# Patient Record
Sex: MALE | Race: WHITE | NOT HISPANIC OR LATINO | Employment: OTHER | URBAN - METROPOLITAN AREA
[De-identification: names, ages, dates, MRNs, and addresses within clinical notes are randomized per-mention and may not be internally consistent; named-entity substitution may affect disease eponyms.]

---

## 2017-01-10 ENCOUNTER — GENERIC CONVERSION - ENCOUNTER (OUTPATIENT)
Dept: OTHER | Facility: OTHER | Age: 77
End: 2017-01-10

## 2017-01-18 ENCOUNTER — GENERIC CONVERSION - ENCOUNTER (OUTPATIENT)
Dept: OTHER | Facility: OTHER | Age: 77
End: 2017-01-18

## 2017-01-19 LAB
INR PPP: 2.9 (ref 0.8–1.2)
PROTHROMBIN TIME: 30.1 SEC (ref 9.1–12)

## 2017-01-30 ENCOUNTER — GENERIC CONVERSION - ENCOUNTER (OUTPATIENT)
Dept: OTHER | Facility: OTHER | Age: 77
End: 2017-01-30

## 2017-01-31 LAB
INR PPP: 3.3 (ref 0.8–1.2)
PROTHROMBIN TIME: 33.9 SEC (ref 9.1–12)

## 2017-02-15 ENCOUNTER — GENERIC CONVERSION - ENCOUNTER (OUTPATIENT)
Dept: OTHER | Facility: OTHER | Age: 77
End: 2017-02-15

## 2017-02-16 LAB
INR PPP: 3.5 (ref 0.8–1.2)
PROTHROMBIN TIME: 36.1 SEC (ref 9.1–12)

## 2017-03-17 ENCOUNTER — HOSPITAL ENCOUNTER (OUTPATIENT)
Facility: HOSPITAL | Age: 77
Discharge: HOME HEALTH | End: 2017-03-31
Attending: PHYSICAL MEDICINE & REHABILITATION | Admitting: PHYSICAL MEDICINE & REHABILITATION

## 2017-03-18 LAB
ALBUMIN SERPL BCP-MCNC: 2.6 G/DL (ref 3.5–5)
ALP SERPL-CCNC: 79 U/L (ref 46–116)
ALT SERPL W P-5'-P-CCNC: 49 U/L (ref 12–78)
ANION GAP SERPL CALCULATED.3IONS-SCNC: 7 MMOL/L (ref 4–13)
AST SERPL W P-5'-P-CCNC: 38 U/L (ref 5–45)
BASOPHILS # BLD AUTO: 0.02 THOUSANDS/ΜL (ref 0–0.1)
BASOPHILS NFR BLD AUTO: 0 % (ref 0–1)
BILIRUB DIRECT SERPL-MCNC: 0.36 MG/DL (ref 0–0.2)
BILIRUB SERPL-MCNC: 1.1 MG/DL (ref 0.2–1)
BUN SERPL-MCNC: 20 MG/DL (ref 5–25)
CALCIUM SERPL-MCNC: 9 MG/DL (ref 8.3–10.1)
CHLORIDE SERPL-SCNC: 107 MMOL/L (ref 100–108)
CO2 SERPL-SCNC: 30 MMOL/L (ref 21–32)
CREAT SERPL-MCNC: 0.81 MG/DL (ref 0.6–1.3)
EOSINOPHIL # BLD AUTO: 0.05 THOUSAND/ΜL (ref 0–0.61)
EOSINOPHIL NFR BLD AUTO: 1 % (ref 0–6)
ERYTHROCYTE [DISTWIDTH] IN BLOOD BY AUTOMATED COUNT: 15.4 % (ref 11.6–15.1)
GFR SERPL CREATININE-BSD FRML MDRD: >60 ML/MIN/1.73SQ M
GLUCOSE SERPL-MCNC: 117 MG/DL (ref 65–140)
HCT VFR BLD AUTO: 41.9 % (ref 36.5–49.3)
HGB BLD-MCNC: 13.4 G/DL (ref 12–17)
INR PPP: 3.06 (ref 0.86–1.16)
LYMPHOCYTES # BLD AUTO: 1.48 THOUSANDS/ΜL (ref 0.6–4.47)
LYMPHOCYTES NFR BLD AUTO: 25 % (ref 14–44)
MCH RBC QN AUTO: 30.6 PG (ref 26.8–34.3)
MCHC RBC AUTO-ENTMCNC: 32 G/DL (ref 31.4–37.4)
MCV RBC AUTO: 96 FL (ref 82–98)
MONOCYTES # BLD AUTO: 0.78 THOUSAND/ΜL (ref 0.17–1.22)
MONOCYTES NFR BLD AUTO: 13 % (ref 4–12)
NEUTROPHILS # BLD AUTO: 3.61 THOUSANDS/ΜL (ref 1.85–7.62)
NEUTS SEG NFR BLD AUTO: 61 % (ref 43–75)
NRBC BLD AUTO-RTO: 0 /100 WBCS
PLATELET # BLD AUTO: 201 THOUSANDS/UL (ref 149–390)
PMV BLD AUTO: 9.4 FL (ref 8.9–12.7)
POTASSIUM SERPL-SCNC: 4 MMOL/L (ref 3.5–5.3)
PROT SERPL-MCNC: 7 G/DL (ref 6.4–8.2)
PROTHROMBIN TIME: 30.6 SECONDS (ref 12–14.3)
RBC # BLD AUTO: 4.38 MILLION/UL (ref 3.88–5.62)
SODIUM SERPL-SCNC: 144 MMOL/L (ref 136–145)
WBC # BLD AUTO: 5.96 THOUSAND/UL (ref 4.31–10.16)

## 2017-03-18 PROCEDURE — 80048 BASIC METABOLIC PNL TOTAL CA: CPT | Performed by: INTERNAL MEDICINE

## 2017-03-18 PROCEDURE — 85610 PROTHROMBIN TIME: CPT | Performed by: INTERNAL MEDICINE

## 2017-03-18 PROCEDURE — 85025 COMPLETE CBC W/AUTO DIFF WBC: CPT | Performed by: INTERNAL MEDICINE

## 2017-03-18 PROCEDURE — 80076 HEPATIC FUNCTION PANEL: CPT | Performed by: PHYSICAL MEDICINE & REHABILITATION

## 2017-03-20 LAB
ALBUMIN SERPL BCP-MCNC: 2.6 G/DL (ref 3.5–5)
ALP SERPL-CCNC: 81 U/L (ref 46–116)
ALT SERPL W P-5'-P-CCNC: 52 U/L (ref 12–78)
ANION GAP SERPL CALCULATED.3IONS-SCNC: 8 MMOL/L (ref 4–13)
AST SERPL W P-5'-P-CCNC: 38 U/L (ref 5–45)
BASOPHILS # BLD AUTO: 0.02 THOUSANDS/ΜL (ref 0–0.1)
BASOPHILS NFR BLD AUTO: 0 % (ref 0–1)
BILIRUB SERPL-MCNC: 1 MG/DL (ref 0.2–1)
BUN SERPL-MCNC: 19 MG/DL (ref 5–25)
CALCIUM SERPL-MCNC: 9 MG/DL (ref 8.3–10.1)
CHLORIDE SERPL-SCNC: 103 MMOL/L (ref 100–108)
CO2 SERPL-SCNC: 28 MMOL/L (ref 21–32)
CREAT SERPL-MCNC: 0.85 MG/DL (ref 0.6–1.3)
EOSINOPHIL # BLD AUTO: 0.06 THOUSAND/ΜL (ref 0–0.61)
EOSINOPHIL NFR BLD AUTO: 1 % (ref 0–6)
ERYTHROCYTE [DISTWIDTH] IN BLOOD BY AUTOMATED COUNT: 15.2 % (ref 11.6–15.1)
GFR SERPL CREATININE-BSD FRML MDRD: >60 ML/MIN/1.73SQ M
GLUCOSE SERPL-MCNC: 114 MG/DL (ref 65–140)
HCT VFR BLD AUTO: 39.1 % (ref 36.5–49.3)
HGB BLD-MCNC: 12.7 G/DL (ref 12–17)
INR PPP: 2.87 (ref 0.86–1.16)
LYMPHOCYTES # BLD AUTO: 1.61 THOUSANDS/ΜL (ref 0.6–4.47)
LYMPHOCYTES NFR BLD AUTO: 25 % (ref 14–44)
MCH RBC QN AUTO: 30.8 PG (ref 26.8–34.3)
MCHC RBC AUTO-ENTMCNC: 32.5 G/DL (ref 31.4–37.4)
MCV RBC AUTO: 95 FL (ref 82–98)
MONOCYTES # BLD AUTO: 0.92 THOUSAND/ΜL (ref 0.17–1.22)
MONOCYTES NFR BLD AUTO: 14 % (ref 4–12)
NEUTROPHILS # BLD AUTO: 3.77 THOUSANDS/ΜL (ref 1.85–7.62)
NEUTS SEG NFR BLD AUTO: 59 % (ref 43–75)
NRBC BLD AUTO-RTO: 0 /100 WBCS
PLATELET # BLD AUTO: 256 THOUSANDS/UL (ref 149–390)
PMV BLD AUTO: 9.5 FL (ref 8.9–12.7)
POTASSIUM SERPL-SCNC: 3.5 MMOL/L (ref 3.5–5.3)
PROT SERPL-MCNC: 6.9 G/DL (ref 6.4–8.2)
PROTHROMBIN TIME: 29.1 SECONDS (ref 12–14.3)
RBC # BLD AUTO: 4.13 MILLION/UL (ref 3.88–5.62)
SODIUM SERPL-SCNC: 139 MMOL/L (ref 136–145)
WBC # BLD AUTO: 6.41 THOUSAND/UL (ref 4.31–10.16)

## 2017-03-20 PROCEDURE — 85610 PROTHROMBIN TIME: CPT | Performed by: PHYSICAL MEDICINE & REHABILITATION

## 2017-03-20 PROCEDURE — 85025 COMPLETE CBC W/AUTO DIFF WBC: CPT | Performed by: PHYSICAL MEDICINE & REHABILITATION

## 2017-03-20 PROCEDURE — 80053 COMPREHEN METABOLIC PANEL: CPT | Performed by: PHYSICAL MEDICINE & REHABILITATION

## 2017-03-23 LAB
ANION GAP SERPL CALCULATED.3IONS-SCNC: 7 MMOL/L (ref 4–13)
BASOPHILS # BLD AUTO: 0.04 THOUSANDS/ΜL (ref 0–0.1)
BASOPHILS NFR BLD AUTO: 1 % (ref 0–1)
BUN SERPL-MCNC: 19 MG/DL (ref 5–25)
CALCIUM SERPL-MCNC: 9.6 MG/DL (ref 8.3–10.1)
CHLORIDE SERPL-SCNC: 103 MMOL/L (ref 100–108)
CO2 SERPL-SCNC: 28 MMOL/L (ref 21–32)
CREAT SERPL-MCNC: 0.91 MG/DL (ref 0.6–1.3)
EOSINOPHIL # BLD AUTO: 0.03 THOUSAND/ΜL (ref 0–0.61)
EOSINOPHIL NFR BLD AUTO: 1 % (ref 0–6)
ERYTHROCYTE [DISTWIDTH] IN BLOOD BY AUTOMATED COUNT: 14.9 % (ref 11.6–15.1)
GFR SERPL CREATININE-BSD FRML MDRD: >60 ML/MIN/1.73SQ M
GLUCOSE SERPL-MCNC: 109 MG/DL (ref 65–140)
HCT VFR BLD AUTO: 41.1 % (ref 36.5–49.3)
HGB BLD-MCNC: 13.2 G/DL (ref 12–17)
INR PPP: 2.31 (ref 0.86–1.16)
LYMPHOCYTES # BLD AUTO: 1.57 THOUSANDS/ΜL (ref 0.6–4.47)
LYMPHOCYTES NFR BLD AUTO: 32 % (ref 14–44)
MCH RBC QN AUTO: 30.6 PG (ref 26.8–34.3)
MCHC RBC AUTO-ENTMCNC: 32.1 G/DL (ref 31.4–37.4)
MCV RBC AUTO: 95 FL (ref 82–98)
MONOCYTES # BLD AUTO: 0.57 THOUSAND/ΜL (ref 0.17–1.22)
MONOCYTES NFR BLD AUTO: 12 % (ref 4–12)
NEUTROPHILS # BLD AUTO: 2.64 THOUSANDS/ΜL (ref 1.85–7.62)
NEUTS SEG NFR BLD AUTO: 54 % (ref 43–75)
NRBC BLD AUTO-RTO: 0 /100 WBCS
PLATELET # BLD AUTO: 316 THOUSANDS/UL (ref 149–390)
PMV BLD AUTO: 9.2 FL (ref 8.9–12.7)
POTASSIUM SERPL-SCNC: 3.9 MMOL/L (ref 3.5–5.3)
PROTHROMBIN TIME: 24.7 SECONDS (ref 12–14.3)
RBC # BLD AUTO: 4.32 MILLION/UL (ref 3.88–5.62)
SODIUM SERPL-SCNC: 138 MMOL/L (ref 136–145)
WBC # BLD AUTO: 4.88 THOUSAND/UL (ref 4.31–10.16)

## 2017-03-23 PROCEDURE — 80048 BASIC METABOLIC PNL TOTAL CA: CPT | Performed by: PHYSICAL MEDICINE & REHABILITATION

## 2017-03-23 PROCEDURE — 85025 COMPLETE CBC W/AUTO DIFF WBC: CPT | Performed by: PHYSICAL MEDICINE & REHABILITATION

## 2017-03-23 PROCEDURE — 85610 PROTHROMBIN TIME: CPT | Performed by: PHYSICAL MEDICINE & REHABILITATION

## 2017-03-27 LAB
ANION GAP SERPL CALCULATED.3IONS-SCNC: 6 MMOL/L (ref 4–13)
BASOPHILS # BLD AUTO: 0.04 THOUSANDS/ΜL (ref 0–0.1)
BASOPHILS NFR BLD AUTO: 1 % (ref 0–1)
BUN SERPL-MCNC: 17 MG/DL (ref 5–25)
CALCIUM SERPL-MCNC: 9.7 MG/DL (ref 8.3–10.1)
CHLORIDE SERPL-SCNC: 104 MMOL/L (ref 100–108)
CO2 SERPL-SCNC: 30 MMOL/L (ref 21–32)
CREAT SERPL-MCNC: 0.86 MG/DL (ref 0.6–1.3)
EOSINOPHIL # BLD AUTO: 0.06 THOUSAND/ΜL (ref 0–0.61)
EOSINOPHIL NFR BLD AUTO: 1 % (ref 0–6)
ERYTHROCYTE [DISTWIDTH] IN BLOOD BY AUTOMATED COUNT: 14.7 % (ref 11.6–15.1)
GFR SERPL CREATININE-BSD FRML MDRD: >60 ML/MIN/1.73SQ M
GLUCOSE SERPL-MCNC: 95 MG/DL (ref 65–140)
HCT VFR BLD AUTO: 43.7 % (ref 36.5–49.3)
HGB BLD-MCNC: 13.9 G/DL (ref 12–17)
INR PPP: 1.67 (ref 0.86–1.16)
LYMPHOCYTES # BLD AUTO: 1.48 THOUSANDS/ΜL (ref 0.6–4.47)
LYMPHOCYTES NFR BLD AUTO: 33 % (ref 14–44)
MCH RBC QN AUTO: 30.4 PG (ref 26.8–34.3)
MCHC RBC AUTO-ENTMCNC: 31.8 G/DL (ref 31.4–37.4)
MCV RBC AUTO: 96 FL (ref 82–98)
MONOCYTES # BLD AUTO: 0.46 THOUSAND/ΜL (ref 0.17–1.22)
MONOCYTES NFR BLD AUTO: 10 % (ref 4–12)
NEUTROPHILS # BLD AUTO: 2.46 THOUSANDS/ΜL (ref 1.85–7.62)
NEUTS SEG NFR BLD AUTO: 54 % (ref 43–75)
NRBC BLD AUTO-RTO: 0 /100 WBCS
PLATELET # BLD AUTO: 362 THOUSANDS/UL (ref 149–390)
PMV BLD AUTO: 9.1 FL (ref 8.9–12.7)
POTASSIUM SERPL-SCNC: 4.1 MMOL/L (ref 3.5–5.3)
PROTHROMBIN TIME: 19.3 SECONDS (ref 12–14.3)
RBC # BLD AUTO: 4.57 MILLION/UL (ref 3.88–5.62)
SODIUM SERPL-SCNC: 140 MMOL/L (ref 136–145)
WBC # BLD AUTO: 4.54 THOUSAND/UL (ref 4.31–10.16)

## 2017-03-27 PROCEDURE — 85025 COMPLETE CBC W/AUTO DIFF WBC: CPT | Performed by: PHYSICAL MEDICINE & REHABILITATION

## 2017-03-27 PROCEDURE — 80048 BASIC METABOLIC PNL TOTAL CA: CPT | Performed by: PHYSICAL MEDICINE & REHABILITATION

## 2017-03-27 PROCEDURE — 85610 PROTHROMBIN TIME: CPT | Performed by: PHYSICAL MEDICINE & REHABILITATION

## 2017-03-28 LAB
INR PPP: 1.78 (ref 0.86–1.16)
PROTHROMBIN TIME: 20.3 SECONDS (ref 12–14.3)

## 2017-03-28 PROCEDURE — 85610 PROTHROMBIN TIME: CPT | Performed by: INTERNAL MEDICINE

## 2017-03-29 LAB
INR PPP: 1.85 (ref 0.86–1.16)
PROTHROMBIN TIME: 20.9 SECONDS (ref 12–14.3)

## 2017-03-29 PROCEDURE — 85610 PROTHROMBIN TIME: CPT | Performed by: NURSE PRACTITIONER

## 2017-03-30 LAB
ALBUMIN SERPL BCP-MCNC: 3 G/DL (ref 3.5–5)
ALP SERPL-CCNC: 93 U/L (ref 46–116)
ALT SERPL W P-5'-P-CCNC: 47 U/L (ref 12–78)
ANION GAP SERPL CALCULATED.3IONS-SCNC: 8 MMOL/L (ref 4–13)
AST SERPL W P-5'-P-CCNC: 27 U/L (ref 5–45)
BASOPHILS # BLD AUTO: 0.06 THOUSANDS/ΜL (ref 0–0.1)
BASOPHILS NFR BLD AUTO: 1 % (ref 0–1)
BILIRUB SERPL-MCNC: 0.6 MG/DL (ref 0.2–1)
BUN SERPL-MCNC: 14 MG/DL (ref 5–25)
CALCIUM SERPL-MCNC: 9.6 MG/DL (ref 8.3–10.1)
CHLORIDE SERPL-SCNC: 104 MMOL/L (ref 100–108)
CO2 SERPL-SCNC: 29 MMOL/L (ref 21–32)
CREAT SERPL-MCNC: 0.85 MG/DL (ref 0.6–1.3)
EOSINOPHIL # BLD AUTO: 0.08 THOUSAND/ΜL (ref 0–0.61)
EOSINOPHIL NFR BLD AUTO: 2 % (ref 0–6)
ERYTHROCYTE [DISTWIDTH] IN BLOOD BY AUTOMATED COUNT: 15 % (ref 11.6–15.1)
GFR SERPL CREATININE-BSD FRML MDRD: >60 ML/MIN/1.73SQ M
GLUCOSE SERPL-MCNC: 93 MG/DL (ref 65–140)
HCT VFR BLD AUTO: 43.8 % (ref 36.5–49.3)
HGB BLD-MCNC: 14 G/DL (ref 12–17)
LYMPHOCYTES # BLD AUTO: 2 THOUSANDS/ΜL (ref 0.6–4.47)
LYMPHOCYTES NFR BLD AUTO: 38 % (ref 14–44)
MCH RBC QN AUTO: 30.3 PG (ref 26.8–34.3)
MCHC RBC AUTO-ENTMCNC: 32 G/DL (ref 31.4–37.4)
MCV RBC AUTO: 95 FL (ref 82–98)
MONOCYTES # BLD AUTO: 0.55 THOUSAND/ΜL (ref 0.17–1.22)
MONOCYTES NFR BLD AUTO: 11 % (ref 4–12)
NEUTROPHILS # BLD AUTO: 2.47 THOUSANDS/ΜL (ref 1.85–7.62)
NEUTS SEG NFR BLD AUTO: 47 % (ref 43–75)
NRBC BLD AUTO-RTO: 0 /100 WBCS
PLATELET # BLD AUTO: 321 THOUSANDS/UL (ref 149–390)
PMV BLD AUTO: 9.2 FL (ref 8.9–12.7)
POTASSIUM SERPL-SCNC: 3.8 MMOL/L (ref 3.5–5.3)
PROT SERPL-MCNC: 7.2 G/DL (ref 6.4–8.2)
RBC # BLD AUTO: 4.62 MILLION/UL (ref 3.88–5.62)
SODIUM SERPL-SCNC: 141 MMOL/L (ref 136–145)
WBC # BLD AUTO: 5.21 THOUSAND/UL (ref 4.31–10.16)

## 2017-03-30 PROCEDURE — 85025 COMPLETE CBC W/AUTO DIFF WBC: CPT | Performed by: INTERNAL MEDICINE

## 2017-03-30 PROCEDURE — 80053 COMPREHEN METABOLIC PANEL: CPT | Performed by: INTERNAL MEDICINE

## 2017-03-31 LAB
INR PPP: 1.72 (ref 0.86–1.16)
PROTHROMBIN TIME: 19.7 SECONDS (ref 12–14.3)

## 2017-03-31 PROCEDURE — 85610 PROTHROMBIN TIME: CPT | Performed by: PHYSICAL MEDICINE & REHABILITATION

## 2017-04-17 ENCOUNTER — GENERIC CONVERSION - ENCOUNTER (OUTPATIENT)
Dept: OTHER | Facility: OTHER | Age: 77
End: 2017-04-17

## 2017-04-17 DIAGNOSIS — I48.91 ATRIAL FIBRILLATION (HCC): ICD-10-CM

## 2017-04-21 ENCOUNTER — ALLSCRIPTS OFFICE VISIT (OUTPATIENT)
Dept: OTHER | Facility: OTHER | Age: 77
End: 2017-04-21

## 2017-06-29 ENCOUNTER — GENERIC CONVERSION - ENCOUNTER (OUTPATIENT)
Dept: OTHER | Facility: OTHER | Age: 77
End: 2017-06-29

## 2017-06-30 DIAGNOSIS — I48.91 ATRIAL FIBRILLATION (HCC): ICD-10-CM

## 2017-08-16 ENCOUNTER — GENERIC CONVERSION - ENCOUNTER (OUTPATIENT)
Dept: OTHER | Facility: OTHER | Age: 77
End: 2017-08-16

## 2017-08-21 ENCOUNTER — ALLSCRIPTS OFFICE VISIT (OUTPATIENT)
Dept: OTHER | Facility: OTHER | Age: 77
End: 2017-08-21

## 2017-09-13 DIAGNOSIS — I48.91 ATRIAL FIBRILLATION (HCC): ICD-10-CM

## 2018-01-09 NOTE — PROGRESS NOTES
REPORT NAME: Patient Visit Summary Report   VISIT DATE: 5/31/2016  VISIT TIME: 2:17 PM EDT  PATIENT NAME: Douglas Vega RECORD NUMBER: [id number]  SOCIAL SECURITY NUMBER:   YOB: 1940  AGE: 68  REFERRING PHYSICIAN: Dorota Laguna  SUPERVISING CLINICIAN: Dorota Laguna  HEALTH CARE PROFESSIONAL: Estefania Crum   PATIENT HOME ADDRESS: [address] [place] [address]  PATIENT HOME PHONE: [phone]  DIAGNOSIS 1: Atrial Fibrillation / 427 31  DIAGNOSIS 2:   DIAGNOSIS 3:   DIAGNOSIS 4:   INR RANGE: 2 - 3  INR GOAL: 2 5  TREATMENT START DATE:   TREATMENT END DATE:   NEXT VISIT: 6/21/2016  [facility]    VISIT RESULTS   ENCOUNTER NUMBER:   TEST LOCATION: [facility]  TEST TYPE: Outside Lab (Venipuncture)  VISIT TYPE:   CURRENT INR: 2 66 PROTIME:   SPECIMEN COL AND RPT DATE: 5/31/2016 2:17 PM  EDT    VITAL SIGNS  PULSE:  B/P:  WEIGHT:  HEIGHT:  TEMP:     CURRENT ANTICOAGULANT DOSING SCHEDULE  DOSE SIZE: 5mg    ANTICOAGULANT TYPE: COUMADIN  DOSING REGIMEN  Sun       Mon Tues Wed Thurs Fri       Sat  Total/Wk  5         5         5         2 5       5         5         5         32 5    PATIENT MEDICATION INSTRUCTION: Yes  PATIENT NUTRITIONAL COUNSELING: No  PATIENT BRUISING INSTRUCTION: No      LAST EDUCATION DATE:       PREVIOUS VISIT INFORMATION  VISITDATE   INR Goal  INR   Sun     Mon Tues Wed Thurs Fri  Sat     Total/wk  5/31/2016   2 5       2 66  5       5       5       2 5     5       5  5       32 5  5/27/2016   2 5       2 66  5       5       5       2 5     5       5  5       32 5  5/20/2016   2 5       1 63  5       5       5       2 5     5       5  5       32 5  5/6/2016    2 5       1 7   5       5       2 5     5       5       2 5  5       30    ADDITIONAL PREVIOUS VISIT INFORMATION  VISITDATE   PRIMARY RX               DOSE      CrCl  5/31/2016   COUMADIN                 5mg                 5/27/2016   COUMADIN                 5mg 5/20/2016   COUMADIN                 5mg                 5/6/2016    COUMADIN                 5mg                     OTHER CURRENT MEDICATIONS: COUMADIN      PROGRESS NOTES: same dose   recheck 2-3 weeks - per AS    PATIENT INSTRUCTIONS: spoke with patients wife    TEST LOCATION: [facility]    Electronically signed by: Yasmeen Hood  on 5/31/2016 at 2:17 PM EDT

## 2018-01-11 NOTE — PROGRESS NOTES
REPORT NAME: Progress Notes Report  VISIT DATE: 4/17/2017  VISIT TIME: 8:51 AM EDT  PATIENT NAME: Conor Keith RECORD NUMBER: [id number]  YOB: 1940  AGE: 68  REFERRING PHYSICIAN: Yoselin Negro  SUPERVISING CLINICIAN: Noemi Rosas CARE PROVIDER: Wilton Mcconnell   PATIENT HOME ADDRESS: [address] [place] [address] Cesario Bellavard PHONE: [phone]  SOCIAL SECURITY NUMBER:   DIAGNOSIS 1: Atrial Fibrillation / 427 31  DIAGNOSIS 2:   INR RANGE: 2 - 3  INR GOAL: 2 5  TREATMENT START DATE:   TREATMENT END DATE:   NEXT VISIT:     VISIT RESULTS  ENCOUNTER NUMBER:   TEST LOCATION: [facility]  TEST TYPE: Outside Lab (Venipuncture)  VISIT TYPE:   CURRENT INR: 2 31 PROTIME:   SPECIMEN COL AND RPT DATE: 4/17/2017 8:51 AM  EDT  VITAL SIGNS  PULSE:  BP: / WEIGHT:  HEIGHT:  TEMP:   CURRENT ANTICOAGULANT DOSING SCHEDULE  DOSE SIZE: 5mg    ANTICOAGULANT TYPE: COUMADIN  DOSING REGIMEN  Sun       Mon Tues Wed Thurs Fri       Sat  Total/Wk  5         5         5         2 5       5         5         5         32 5  PATIENT MEDICATION INSTRUCTION: Yes  PATIENT NUTRITIONAL COUNSELING: No  PATIENT BRUISING INSTRUCTION: No  LAST EDUCATION DATE:   PREVIOUS VISIT INFORMATION  VISITDATE  INRGoal INR   Sun    Mon    Tues   Wed    Thurs  Fri    Sat  Total/wk  4/17/2017   2 5     2 31  5      5      5      2 5    5      5      5  32 5  4/6/2017    2 5     2 2   5      5      5      2 5    5      5      5  32 5  4/3/2017    2 5     2 1   5      5      5      2 5    5      5      5  32 5  2/15/2017   2 5     3 5   5      5      5      2 5    5      5      5  32 5  ADDITIONAL PREVIOUS VISIT INFORMATION  VISITDATE   PRIMARY RX               DOSE      CrCl  4/17/2017   COUMADIN                 5mg  4/6/2017    COUMADIN                 5mg  4/3/2017    COUMADIN                 5mg  2/15/2017   COUMADIN                 5mg  OTHER CURRENT MEDICATIONS:  COUMADIN  PROGRESS NOTES: PER  PAT SAME DOSE RECHECK 4 WEEKS, SPOKE WITH PT  PATIENT INSTRUCTIONS: SEE PROGRESS NOTE  TEST LOCATION: [facility], , ,   INBOUND LAB DATA:  Lab       Lab Value Col Date                 Rpt Date                 Lab  Reference Range  Electronically signed by: Giles Person  on 4/24/2017 8:51 AM EDT

## 2018-01-12 NOTE — PROGRESS NOTES
REPORT NAME: Patient Visit Summary Report   VISIT DATE: 6/8/2016  VISIT TIME: 11:31 AM EDT  PATIENT NAME: Ever Steward   MEDICAL RECORD NUMBER: [id number]  SOCIAL SECURITY NUMBER:   YOB: 1940  AGE: 68  REFERRING PHYSICIAN: Jenifer العلي  SUPERVISING CLINICIAN: Jenifer العلي  HEALTH CARE PROFESSIONAL: Kamala Florian   PATIENT HOME ADDRESS: [address] [place] [address] PHONE: [phone]  DIAGNOSIS 1: Atrial Fibrillation / 427 31  DIAGNOSIS 2:   DIAGNOSIS 3:   DIAGNOSIS 4:   INR RANGE: 2 - 3  INR GOAL: 2 5  TREATMENT START DATE:   TREATMENT END DATE:   NEXT VISIT:       VISIT RESULTS   ENCOUNTER NUMBER:   TEST LOCATION: [facility]  TEST TYPE: Outside Lab (Venipuncture)  VISIT TYPE:   CURRENT INR: 2 57 PROTIME:   SPECIMEN COL AND RPT DATE: 6/8/2016 11:31 AM  EDT    VITAL SIGNS  PULSE:  B/P:  WEIGHT:  HEIGHT:  TEMP:     CURRENT ANTICOAGULANT DOSING SCHEDULE  DOSE SIZE: 5mg    ANTICOAGULANT TYPE: COUMADIN  DOSING REGIMEN  Sun       Mon Tues Wed Thurs Fri       Sat  Total/Wk  5         5         5         2 5       5         5         5         32 5    PATIENT MEDICATION INSTRUCTION: Yes  PATIENT NUTRITIONAL COUNSELING: No  PATIENT BRUISING INSTRUCTION: No      LAST EDUCATION DATE:       PREVIOUS VISIT INFORMATION  VISITDATE   INR Goal  INR   Sun     Mon     Tues    Wed     Thurs   Fri  Sat     Total/wk  6/8/2016    2 5       2 57  5       5       5       2 5     5       5  5       32 5  5/31/2016   2 5       2 66  5       5       5       2 5     5       5  5       32 5  5/27/2016   2 5       2 66  5       5       5       2 5     5       5  5       32 5  5/20/2016   2 5       1 63  5       5       5       2 5     5       5  5       32 5    ADDITIONAL PREVIOUS VISIT INFORMATION  VISITDATE   PRIMARY RX               DOSE      CrCl  6/8/2016    COUMADIN                 5mg                 5/31/2016   COUMADIN                 5mg                 5/27/2016 COUMADIN                 5mg                 5/20/2016   COUMADIN                 5mg                     OTHER CURRENT MEDICATIONS: COUMADIN      PROGRESS NOTES: PER SS/NP SAME DOSE RECHECK 3-4 WEEKS, SPOKE WITH PT    PATIENT INSTRUCTIONS: SEE PROGRESS NOTE    TEST LOCATION: [facility]    Electronically signed by: Dorina Blakely  on 6/9/2016 at 11:31 AM EDT

## 2018-01-12 NOTE — PROGRESS NOTES
REPORT NAME: Patient Visit Summary Report   VISIT DATE: 5/20/2016  VISIT TIME: 2:37 PM EDT  PATIENT NAME: Tamara Ulloa RECORD NUMBER: [id number]  SOCIAL SECURITY NUMBER:   YOB: 1940  AGE: 68  REFERRING PHYSICIAN: Feroz Marino  SUPERVISING CLINICIAN: Feroz Marino  HEALTH CARE PROFESSIONAL: Anthony Blake   PATIENT HOME ADDRESS: [address] [place] [address]  PATIENT HOME PHONE: [phone]  DIAGNOSIS 1: Atrial Fibrillation / 427 31  DIAGNOSIS 2:   DIAGNOSIS 3:   DIAGNOSIS 4:   INR RANGE: 2 - 3  INR GOAL: 2 5  TREATMENT START DATE:   TREATMENT END DATE:   NEXT VISIT:       VISIT RESULTS   ENCOUNTER  NUMBER:   TEST LOCATION: [facility]  TEST TYPE: Outside Lab (Venipuncture)  VISIT TYPE:   CURRENT INR: 1 63 PROTIME:   SPECIMEN COL AND RPT DATE: 5/20/2016 2:37 PM  EDT    VITAL SIGNS  PULSE:  B/P:  WEIGHT:  HEIGHT:  TEMP:      CURRENT ANTICOAGULANT DOSING SCHEDULE  DOSE SIZE: 5mg    ANTICOAGULANT TYPE: COUMADIN  DOSING REGIMEN  Sun       Mon Tues Wed Thurs Fri       Sat  Total/Wk  5         5         5         2 5       5         5          5         32 5    PATIENT MEDICATION INSTRUCTION: Yes  PATIENT NUTRITIONAL COUNSELING: No  PATIENT BRUISING INSTRUCTION: No      LAST EDUCATION DATE:       PREVIOUS VISIT INFORMATION  VISITDATE   INR Goal  INR   Sun     Mon      Tues    Wed     Thurs   Fri  Sat     Total/wk  5/20/2016   2 5       1 63  5       5       5       2 5     5       5  5       32 5  5/6/2016    2 5       1 7   5       5       2 5     5       5       2 5  5       30  4/29/2016    2 5       1 51  5       2 5     5       2 5     5       2 5  5       27 5  2/25/2016   2 5       2 07  5       2 5     5       2 5     5       2 5  5       27 5    ADDITIONAL PREVIOUS VISIT INFORMATION  VISITDATE   PRIMARY RX                DOSE      CrCl  5/20/2016   COUMADIN                 5mg                 5/6/2016    COUMADIN                 5mg 4/29/2016   COUMADIN                 5mg                 2/25/2016   COUMADIN                 5mg                      OTHER CURRENT MEDICATIONS: COUMADIN      PROGRESS NOTES: PER  CHANGE TO 5/5/5/2 5MG RECHECK 2 WEEKS, SPOKE WITH  PTS WIFE    PATIENT INSTRUCTIONS: SEE PROGRESS NOTE    TEST LOCATION: [facility]    Electronically  signed by: Odessa Zaldivar  on 5/23/2016 at 2:37 PM EDT              Electronically signed Kareem PEREZ    Terry 15 2017  4:59PM EST

## 2018-01-12 NOTE — RESULT NOTES
Verified Results  (1) PT WITH INR [id number] 09:06AM Santos Reyna     Test Name Result Flag Reference   INR 2 07     The prothrombin time (PT) test is the test of choice for           monitoring anticoagulant treatment with warfarin  An INR           range of 2 0-3 0 is considered therapeutic for most           indications such as DVT/PE, atrial fibrillation and           myocardial infarction while an INR range of 2 5-3 5 is           considered therapeutic for patients with mechanical heart           valves     Prothrombin Time 22 4 sec H 10 1-11 9

## 2018-01-12 NOTE — PROGRESS NOTES
REPORT NAME: Progress Notes Report  VISIT DATE: 8/16/2017  VISIT TIME: 10:45 AM EDT  PATIENT NAME: Nidia Paulino RECORD NUMBER: [id number]  YOB: 1940  AGE: 68  REFERRING PHYSICIAN: Giuseppe Pineda  SUPERVISING CLINICIAN: Noemi Rosas CARE PROVIDER: Glenda Tyson  PATIENT HOME ADDRESS: [address] [place] [address] PHONE: [phone]  SOCIAL SECURITY NUMBER:   DIAGNOSIS 1: Atrial Fibrillation / 427 31  DIAGNOSIS 2:   INR RANGE: 2 - 3  INR GOAL: 2 5  TREATMENT START DATE:   TREATMENT END DATE:   NEXT VISIT:     VISIT RESULTS  ENCOUNTER NUMBER:   TEST LOCATION: [facility]  TEST TYPE: Outside Lab (Venipuncture)  VISIT TYPE:   CURRENT INR: 2 3 PROTIME:   SPECIMEN COL AND RPT DATE: 8/16/2017 10:45 AM  EDT  VITAL SIGNS  PULSE:  BP: / WEIGHT:  HEIGHT:  TEMP:   CURRENT ANTICOAGULANT DOSING SCHEDULE  DOSE SIZE: 5mg    ANTICOAGULANT TYPE: COUMADIN  DOSING REGIMEN  Sun       Mon       Tues      Wed       Thurs     Fri       Sat  Total/Wk  5         5         5         2 5       5         5         5         32 5  PATIENT MEDICATION INSTRUCTION: Yes  PATIENT NUTRITIONAL COUNSELING: No  PATIENT BRUISING INSTRUCTION: No  LAST EDUCATION DATE:   PREVIOUS VISIT INFORMATION  VISITDATE  INRGoal INR   Sun    Mon Tues Wed Thurs Fri    Sat  Total/wk  8/16/2017   2 5     2 3   5      5      5      2 5    5      5      5  32 5  6/29/2017   2 5     2 14  5      5      5      2 5    5      5      5  32 5  4/17/2017   2 5     2 31  5      5      5      2 5    5      5      5  32 5  4/6/2017    2 5     2 2   5      5      5      2 5    5      5      5  32 5  ADDITIONAL PREVIOUS VISIT INFORMATION  VISITDATE   PRIMARY RX               DOSE      CrCl  8/16/2017   COUMADIN                 5mg  6/29/2017   COUMADIN                 5mg  4/17/2017   COUMADIN                 5mg  4/6/2017    COUMADIN                 5mg  OTHER CURRENT MEDICATIONS:  COUMADIN  PROGRESS NOTES: PER  PAT SAME DOSE RECHECK 4 WEEKS, SPOKE WITH PTS WIFE  PATIENT INSTRUCTIONS: SEE PROGRESS NOTE  TEST LOCATION: [facility], , ,   INBOUND LAB DATA:  Lab       Lab Value Col Date                 Rpt Date                 Lab  Reference Range  Electronically signed by: Criss Youssef on 8/17/2017 10:45 AM EDT

## 2018-01-13 NOTE — PROGRESS NOTES
REPORT NAME: Patient Visit Summary Report   VISIT DATE: 9/12/2016  VISIT TIME: 11:51 AM EDT  PATIENT NAME: Yanick Mcrae   MEDICAL RECORD NUMBER: [id number]  SOCIAL SECURITY NUMBER:   YOB: 1940  AGE: 68  REFERRING PHYSICIAN: Vanna Cueva  SUPERVISING CLINICIAN: Vanna Cueva  HEALTH CARE PROFESSIONAL: Carole Brandon   PATIENT HOME ADDRESS: [address] [place] [address] PHONE: [phone]  DIAGNOSIS 1: Atrial Fibrillation / 427 31  DIAGNOSIS 2:   DIAGNOSIS 3:   DIAGNOSIS 4:   INR RANGE: 2 - 3  INR GOAL: 2 5  TREATMENT START DATE:   TREATMENT END DATE:   NEXT VISIT:       VISIT RESULTS   ENCOUNTER  NUMBER:   TEST LOCATION: [facility]  TEST TYPE: Outside Lab (Venipuncture)  VISIT TYPE:   CURRENT INR: 3 2 PROTIME:   SPECIMEN COL AND RPT DATE: 9/12/2016 11:51 AM  EDT    VITAL SIGNS  PULSE:  B/P:  WEIGHT:  HEIGHT:  TEMP:      CURRENT ANTICOAGULANT DOSING SCHEDULE  DOSE SIZE: 5mg    ANTICOAGULANT TYPE: COUMADIN  DOSING REGIMEN  Sun       Mon Tues Wed Thurs Fri       Sat  Total/Wk  5         5         5         2 5       5         5          5         32 5    PATIENT MEDICATION INSTRUCTION: Yes  PATIENT NUTRITIONAL COUNSELING: No  PATIENT BRUISING INSTRUCTION: No      LAST EDUCATION DATE:       PREVIOUS VISIT INFORMATION  VISITDATE   INR Goal  INR   Sun     Mon      Tues    Wed     Thurs   Fri  Sat     Total/wk  9/12/2016   2 5       3 2   5       5       5       2 5     5       5  5       32 5  8/31/2016   2 5       3 52  5       5       5       2 5     5       5  5       32 5  8/19/2016    2 5       4 07  5       5       5       2 5     5       5  5       32 5  7/22/2016   2 5       3 05  5       5       5       2 5     5       5  5       32 5    ADDITIONAL PREVIOUS VISIT INFORMATION  VISITDATE   PRIMARY RX                DOSE      CrCl  9/12/2016   COUMADIN                 5mg                 8/31/2016   COUMADIN                 5mg 8/19/2016   COUMADIN                 5mg                 7/22/2016   COUMADIN                 5mg                      OTHER CURRENT MEDICATIONS: COUMADIN      PROGRESS NOTES: PER AS/PA SAME DOSE RECHECK 3 WEEKS, SPOKE WITH PTS WIFE    PATIENT INSTRUCTIONS: SEE PROGRESS NOTE    TEST LOCATION: [facility]    Electronically signed by:  Dee Saldana  on 9/13/2016 at 11:51 AM EDT              Electronically signed Hailey PEREZ    Jun 8 2017  5:37PM EST

## 2018-01-13 NOTE — RESULT NOTES
Verified Results  (1) PT WITH INR [id number] 10:01AM Luly Reyna   RESULTS FAXED TO ORDERING PROVIDER BY LAB @16:02  Test Name Result Flag Reference   INR 1 63     The prothrombin time (PT) test is the test of choice for           monitoring anticoagulant treatment with warfarin  An INR           range of 2 0-3 0 is considered therapeutic for most           indications such as DVT/PE, atrial fibrillation and           myocardial infarction while an INR range of 2 5-3 5 is           considered therapeutic for patients with mechanical heart           valves     Prothrombin Time 17 5 sec H 10 1-11 9

## 2018-01-13 NOTE — PROGRESS NOTES
REPORT NAME: Progress Notes Report  VISIT DATE: 6/29/2017  VISIT TIME: 11:09 AM EDT  PATIENT NAME: Mariann Miller   MEDICAL RECORD NUMBER: [id number]  YOB: 1940  AGE: 68  REFERRING PHYSICIAN: Lazara Hagen  SUPERVISING CLINICIAN: Noemi Rosas CARE PROVIDER: Renetta Quiroz  PATIENT HOME ADDRESS: [address] [place] [address] PHONE: [phone]  SOCIAL SECURITY NUMBER:   DIAGNOSIS 1: Atrial Fibrillation / 427 31  DIAGNOSIS 2:   INR RANGE: 2 - 3  INR GOAL: 2 5  TREATMENT START DATE:   TREATMENT END DATE:   NEXT VISIT:     VISIT RESULTS  ENCOUNTER NUMBER:   TEST LOCATION: [facility]  TEST TYPE: Outside Lab (Venipuncture)  VISIT TYPE:   CURRENT INR: 2 14 PROTIME:   SPECIMEN COL AND RPT DATE: 6/29/2017 11:09 AM  EDT  VITAL SIGNS  PULSE:  BP: / WEIGHT:  HEIGHT:  TEMP:   CURRENT ANTICOAGULANT DOSING SCHEDULE  DOSE SIZE: 5mg    ANTICOAGULANT TYPE: COUMADIN  DOSING REGIMEN  Sun       Mon       Tues      Wed       Thurs     Fri       Sat  Total/Wk  5         5         5         2 5       5         5         5         32 5  PATIENT MEDICATION INSTRUCTION: Yes  PATIENT NUTRITIONAL COUNSELING: No  PATIENT BRUISING INSTRUCTION: No  LAST EDUCATION DATE:   PREVIOUS VISIT INFORMATION  VISITDATE  INRGoal INR   Sun    Mon Tues Wed Thurs Fri    Sat  Total/wk  6/29/2017   2 5     2 14  5      5      5      2 5    5      5      5  32 5  4/17/2017   2 5     2 31  5      5      5      2 5    5      5      5  32 5  4/6/2017    2 5     2 2   5      5      5      2 5    5      5      5  32 5  4/3/2017    2 5     2 1   5      5      5      2 5    5      5      5  32 5  ADDITIONAL PREVIOUS VISIT INFORMATION  VISITDATE   PRIMARY RX               DOSE      CrCl  6/29/2017   COUMADIN                 5mg  4/17/2017   COUMADIN                 5mg  4/6/2017    COUMADIN                 5mg  4/3/2017    COUMADIN                 5mg  OTHER CURRENT MEDICATIONS:  COUMADIN  PROGRESS NOTES: PER  PAT SAME DOSE RECHECK 4 WEEKS, SPOKE WITH PTS WIFE  PATIENT INSTRUCTIONS: SEE PROGRESS NOTE  TEST LOCATION: [facility], , ,   INBOUND LAB DATA:  Lab       Lab Value Col Date                 Rpt Date                 Lab  Reference Range  Electronically signed by: Betzaida Caldera on 6/30/2017 11:09 AM EDT

## 2018-01-14 VITALS
HEIGHT: 70 IN | SYSTOLIC BLOOD PRESSURE: 136 MMHG | WEIGHT: 257.25 LBS | BODY MASS INDEX: 36.83 KG/M2 | HEART RATE: 80 BPM | DIASTOLIC BLOOD PRESSURE: 78 MMHG

## 2018-01-14 VITALS
DIASTOLIC BLOOD PRESSURE: 82 MMHG | WEIGHT: 247.13 LBS | SYSTOLIC BLOOD PRESSURE: 130 MMHG | HEIGHT: 70 IN | HEART RATE: 88 BPM | BODY MASS INDEX: 35.38 KG/M2

## 2018-01-14 NOTE — RESULT NOTES
Verified Results  (1) PT WITH INR [id number] 09:20AM Julianna Reyna   RESULTS FAXED TO ORDERING PROVIDER ON 6/8/2016 @ 4228     Test Name Result Flag Reference   INR 2 57     The prothrombin time (PT) test is the test of choice for           monitoring anticoagulant treatment with warfarin  An INR           range of 2 0-3 0 is considered therapeutic for most           indications such as DVT/PE, atrial fibrillation and           myocardial infarction while an INR range of 2 5-3 5 is           considered therapeutic for patients with mechanical heart           valves     Prothrombin Time 28 1 sec H 10 1-11 9

## 2018-01-15 NOTE — PROGRESS NOTES
REPORT NAME: Patient Visit Summary Report   VISIT DATE: 2/25/2016  VISIT TIME: 1:14 PM EST  PATIENT NAME: Kim Dumont RECORD NUMBER: [id number]  SOCIAL SECURITY NUMBER:   YOB: 1940  AGE: 76  REFERRING PHYSICIAN: Taniya Ashraf  SUPERVISING CLINICIAN: Taniya Ashraf  HEALTH CARE PROFESSIONAL: Myrick Kawasaki   PATIENT HOME ADDRESS: [address] [place] [address] PHONE: [phone]  DIAGNOSIS 1: Atrial Fibrillation / 427 31  DIAGNOSIS 2:   DIAGNOSIS 3:   DIAGNOSIS 4:   INR RANGE: 2 - 3  INR GOAL: 2 5  TREATMENT START DATE:   TREATMENT END DATE:   NEXT VISIT:       VISIT RESULTS   ENCOUNTER NUMBER:   TEST LOCATION: [facility]  TEST TYPE: Outside Lab (Venipuncture)  VISIT TYPE:   CURRENT INR: 2 07 PROTIME:   SPECIMEN COL AND RPT DATE: 2/25/2016 1:14 PM  EST    VITAL SIGNS  PULSE:  B/P:  WEIGHT:  HEIGHT:  TEMP:     CURRENT ANTICOAGULANT DOSING SCHEDULE  DOSE SIZE: 5mg    ANTICOAGULANT TYPE: COUMADIN  DOSING REGIMEN  Sun       Mon Tues Wed Thurs Fri       Sat  Total/Wk  5         2 5       5         2 5       5         2 5       5         27 5    PATIENT MEDICATION INSTRUCTION: Yes  PATIENT NUTRITIONAL COUNSELING: No  PATIENT BRUISING INSTRUCTION: No      LAST EDUCATION DATE:       PREVIOUS VISIT INFORMATION  VISITDATE   INR Goal  INR   Sun     Mon Tues Wed Thurs Fri  Sat     Total/wk  2/25/2016   2 5       2 07  5       2 5     5       2 5     5       2 5  5       27 5  12/22/2015  2 5       2 6   5       2 5     5       2 5     5       2 5  5       27 5  10/14/2015  2 5       2 19  5       2 5     5       2 5     5       2 5  5       27 5  7/8/2015    2 5       1 94  5       2 5     5       2 5     5       2 5  5       27 5    ADDITIONAL PREVIOUS VISIT INFORMATION  VISITDATE   PRIMARY RX               DOSE      CrCl  2/25/2016   COUMADIN                 5mg                 12/22/2015  COUMADIN                 5mg 10/14/2015  COUMADIN                 5mg                 7/8/2015    COUMADIN                 5mg                     OTHER CURRENT MEDICATIONS: COUMADIN      PROGRESS NOTES: PER AS/PA SAME DOSE RECHECK 3 WEEKS, LMOM    PATIENT INSTRUCTIONS: SEE PROGRESS NOTE    TEST LOCATION: [facility]    Electronically signed by: Nicko Bell  on 2/26/2016 at 1:14 PM EST

## 2018-01-15 NOTE — RESULT NOTES
Verified Results  (1) PT WITH INR [id number] 08:02AM Jak Reyna   Results faxed to ordering provider by lab @15:57  Test Name Result Flag Reference   INR 3 20     The prothrombin time (PT) test is the test of choice for           monitoring anticoagulant treatment with warfarin  An INR           range of 2 0-3 0 is considered therapeutic for most           indications such as DVT/PE, atrial fibrillation and           myocardial infarction while an INR range of 2 5-3 5 is           considered therapeutic for patients with mechanical heart           valves     Prothrombin Time 33 3 sec H 10 1-11 9

## 2018-01-17 NOTE — RESULT NOTES
Verified Results  (1) PT WITH INR [id number] 10:47AM Katalina Reyna     Test Name Result Flag Reference   INR 2 9 H 0 8-1 2   Reference interval is for non-anticoagulated patients                   Suggested INR therapeutic range for Vitamin K                 antagonist therapy:                    Standard Dose (moderate intensity                                   therapeutic range):       2 0 - 3 0                    Higher intensity therapeutic range       2 5 - 3 5   Prothrombin Time 30 1 sec H 9 1-12 0

## 2018-01-17 NOTE — PROGRESS NOTES
REPORT NAME: Patient Visit Summary Report   VISIT DATE: 1/10/2017  VISIT TIME: 10:39 AM EST  PATIENT NAME: Jeanna Franco RECORD NUMBER: [id number]  SOCIAL SECURITY NUMBER:   YOB: 1940  AGE: 68  REFERRING PHYSICIAN: Sofy Novak  SUPERVISING CLINICIAN: Sofy Novak  HEALTH CARE PROFESSIONAL: Devika Foster   PATIENT HOME ADDRESS: [address] [place] [address] PHONE: [phone]  DIAGNOSIS 1: Atrial Fibrillation / 427 31  DIAGNOSIS 2:   DIAGNOSIS 3:   DIAGNOSIS 4:   INR RANGE: 2 - 3  INR GOAL: 2 5  TREATMENT START DATE:   TREATMENT END DATE:   NEXT VISIT:       VISIT RESULTS   ENCOUNTER  NUMBER:   TEST LOCATION: [facility]  TEST TYPE: Outside Lab (Venipuncture)  VISIT TYPE:   CURRENT INR: 3 52 PROTIME:   SPECIMEN COL AND RPT DATE: 1/10/2017 10:39 AM  EST    VITAL SIGNS  PULSE:  B/P:  WEIGHT:  HEIGHT:  TEMP:      CURRENT ANTICOAGULANT DOSING SCHEDULE  DOSE SIZE: 5mg    ANTICOAGULANT TYPE: COUMADIN  DOSING REGIMEN  Sun       Mon Tues Wed Thurs Fri       Sat  Total/Wk  5         5         5         2 5       5         5          5         32 5    PATIENT MEDICATION INSTRUCTION: Yes  PATIENT NUTRITIONAL COUNSELING: No  PATIENT BRUISING INSTRUCTION: No      LAST EDUCATION DATE:       PREVIOUS VISIT INFORMATION  VISITDATE   INR Goal  INR   Sun     Mon      Tues    Wed     Thurs   Fri  Sat     Total/wk  1/10/2017   2 5       3 52  5       5       5       2 5     5       5  5       32 5  11/25/2016  2 5       3     5       5       5       2 5     5       5  5       32 5  10/14/2016   2 5       4 09  5       5       5       2 5     5       5  5       32 5  10/6/2016   2 5       3 44  5       5       5       2 5     5       5  5       32 5    ADDITIONAL PREVIOUS VISIT INFORMATION  VISITDATE   PRIMARY RX                DOSE      CrCl  1/10/2017   COUMADIN                 5mg                 11/25/2016  COUMADIN                 5mg 10/14/2016  COUMADIN                 5mg                 10/6/2016   COUMADIN                 5mg                        PROGRESS NOTES: PER DR STEWART HOLD 1 DAY RESUME NORMAL DOSE RECHECK 1 WEEK,  SPOKE WITH PTS WIFE    PATIENT INSTRUCTIONS: SEE PROGRESS NOTE    TEST LOCATION: [facility]    Electronically signed by: Praveen Chu   on 1/11/2017 at 10:39 AM EST              Electronically signed Kimberly PEREZ    Feb 20 2017  5:06PM EST

## 2024-08-01 ENCOUNTER — APPOINTMENT (EMERGENCY)
Dept: RADIOLOGY | Facility: HOSPITAL | Age: 84
DRG: 291 | End: 2024-08-01
Payer: MEDICARE

## 2024-08-01 ENCOUNTER — HOSPITAL ENCOUNTER (INPATIENT)
Facility: HOSPITAL | Age: 84
LOS: 4 days | Discharge: NURSING, REHAB OR LONG-TERM CARE | DRG: 291 | End: 2024-08-06
Attending: EMERGENCY MEDICINE | Admitting: INTERNAL MEDICINE
Payer: MEDICARE

## 2024-08-01 ENCOUNTER — APPOINTMENT (EMERGENCY)
Dept: CT IMAGING | Facility: HOSPITAL | Age: 84
DRG: 291 | End: 2024-08-01
Payer: MEDICARE

## 2024-08-01 DIAGNOSIS — R60.0 LOWER EXTREMITY EDEMA: ICD-10-CM

## 2024-08-01 DIAGNOSIS — R44.3 HALLUCINATIONS: ICD-10-CM

## 2024-08-01 DIAGNOSIS — M79.89 SWELLING OF LEFT HAND: ICD-10-CM

## 2024-08-01 DIAGNOSIS — M79.602 PAIN AND SWELLING OF LEFT UPPER EXTREMITY: ICD-10-CM

## 2024-08-01 DIAGNOSIS — R53.1 GENERALIZED WEAKNESS: Primary | ICD-10-CM

## 2024-08-01 DIAGNOSIS — R79.82 ELEVATED C-REACTIVE PROTEIN (CRP): ICD-10-CM

## 2024-08-01 DIAGNOSIS — R41.82 ALTERED MENTAL STATUS: ICD-10-CM

## 2024-08-01 DIAGNOSIS — R93.0 ABNORMAL CT SCAN OF HEAD: ICD-10-CM

## 2024-08-01 DIAGNOSIS — R74.8 ELEVATED LIVER ENZYMES: ICD-10-CM

## 2024-08-01 DIAGNOSIS — M79.89 PAIN AND SWELLING OF LEFT UPPER EXTREMITY: ICD-10-CM

## 2024-08-01 DIAGNOSIS — G91.2 NORMAL PRESSURE HYDROCEPHALUS (HCC): ICD-10-CM

## 2024-08-01 LAB
BILIRUB UR QL STRIP: NEGATIVE
CLARITY UR: CLEAR
COLOR UR: YELLOW
GLUCOSE UR STRIP-MCNC: NEGATIVE MG/DL
HGB UR QL STRIP.AUTO: ABNORMAL
KETONES UR STRIP-MCNC: NEGATIVE MG/DL
LEUKOCYTE ESTERASE UR QL STRIP: NEGATIVE
NITRITE UR QL STRIP: NEGATIVE
PH UR STRIP.AUTO: 5.5 [PH]
PROT UR STRIP-MCNC: ABNORMAL MG/DL
SP GR UR STRIP.AUTO: 1.02 (ref 1–1.03)
UROBILINOGEN UR STRIP-ACNC: <2 MG/DL

## 2024-08-01 PROCEDURE — 36415 COLL VENOUS BLD VENIPUNCTURE: CPT | Performed by: EMERGENCY MEDICINE

## 2024-08-01 PROCEDURE — 86140 C-REACTIVE PROTEIN: CPT | Performed by: EMERGENCY MEDICINE

## 2024-08-01 PROCEDURE — 85610 PROTHROMBIN TIME: CPT | Performed by: EMERGENCY MEDICINE

## 2024-08-01 PROCEDURE — 83880 ASSAY OF NATRIURETIC PEPTIDE: CPT | Performed by: EMERGENCY MEDICINE

## 2024-08-01 PROCEDURE — 85652 RBC SED RATE AUTOMATED: CPT | Performed by: EMERGENCY MEDICINE

## 2024-08-01 PROCEDURE — 84484 ASSAY OF TROPONIN QUANT: CPT | Performed by: EMERGENCY MEDICINE

## 2024-08-01 PROCEDURE — 70450 CT HEAD/BRAIN W/O DYE: CPT

## 2024-08-01 PROCEDURE — 85025 COMPLETE CBC W/AUTO DIFF WBC: CPT | Performed by: EMERGENCY MEDICINE

## 2024-08-01 PROCEDURE — 81001 URINALYSIS AUTO W/SCOPE: CPT | Performed by: EMERGENCY MEDICINE

## 2024-08-01 PROCEDURE — 99285 EMERGENCY DEPT VISIT HI MDM: CPT

## 2024-08-01 PROCEDURE — 73130 X-RAY EXAM OF HAND: CPT

## 2024-08-01 PROCEDURE — 71045 X-RAY EXAM CHEST 1 VIEW: CPT

## 2024-08-01 PROCEDURE — 80053 COMPREHEN METABOLIC PANEL: CPT | Performed by: EMERGENCY MEDICINE

## 2024-08-02 ENCOUNTER — APPOINTMENT (INPATIENT)
Dept: CT IMAGING | Facility: HOSPITAL | Age: 84
DRG: 291 | End: 2024-08-02
Payer: MEDICARE

## 2024-08-02 ENCOUNTER — APPOINTMENT (INPATIENT)
Dept: NON INVASIVE DIAGNOSTICS | Facility: HOSPITAL | Age: 84
DRG: 291 | End: 2024-08-02
Payer: MEDICARE

## 2024-08-02 ENCOUNTER — APPOINTMENT (INPATIENT)
Dept: MRI IMAGING | Facility: HOSPITAL | Age: 84
DRG: 291 | End: 2024-08-02
Payer: MEDICARE

## 2024-08-02 ENCOUNTER — APPOINTMENT (INPATIENT)
Dept: ULTRASOUND IMAGING | Facility: HOSPITAL | Age: 84
DRG: 291 | End: 2024-08-02
Payer: MEDICARE

## 2024-08-02 PROBLEM — I48.91 ATRIAL FIBRILLATION (HCC): Status: ACTIVE | Noted: 2024-08-02

## 2024-08-02 PROBLEM — R41.82 ALTERED MENTAL STATUS: Status: ACTIVE | Noted: 2024-08-02

## 2024-08-02 PROBLEM — G91.2 NORMAL PRESSURE HYDROCEPHALUS (HCC): Status: ACTIVE | Noted: 2024-08-02

## 2024-08-02 PROBLEM — I89.0 CHRONIC ACQUIRED LYMPHEDEMA: Status: ACTIVE | Noted: 2024-08-02

## 2024-08-02 PROBLEM — R74.8 ELEVATED LIVER ENZYMES: Status: ACTIVE | Noted: 2024-08-02

## 2024-08-02 PROBLEM — I50.9 CHF (CONGESTIVE HEART FAILURE) (HCC): Status: ACTIVE | Noted: 2024-08-02

## 2024-08-02 PROBLEM — M79.602 PAIN AND SWELLING OF LEFT UPPER EXTREMITY: Status: ACTIVE | Noted: 2024-08-02

## 2024-08-02 PROBLEM — M79.89 PAIN AND SWELLING OF LEFT UPPER EXTREMITY: Status: ACTIVE | Noted: 2024-08-02

## 2024-08-02 PROBLEM — R53.1 GENERALIZED WEAKNESS: Status: ACTIVE | Noted: 2024-08-02

## 2024-08-02 LAB
2HR DELTA HS TROPONIN: 19 NG/L
4HR DELTA HS TROPONIN: 20 NG/L
ALBUMIN SERPL BCG-MCNC: 4.1 G/DL (ref 3.5–5)
ALP SERPL-CCNC: 79 U/L (ref 34–104)
ALT SERPL W P-5'-P-CCNC: 11 U/L (ref 7–52)
ANION GAP SERPL CALCULATED.3IONS-SCNC: 13 MMOL/L (ref 4–13)
ANION GAP SERPL CALCULATED.3IONS-SCNC: 7 MMOL/L (ref 4–13)
AORTIC ROOT: 3.7 CM
APICAL FOUR CHAMBER EJECTION FRACTION: 56 %
ASCENDING AORTA: 4 CM
AST SERPL W P-5'-P-CCNC: 25 U/L (ref 13–39)
AV LVOT MEAN GRADIENT: 1 MMHG
AV LVOT PEAK GRADIENT: 3 MMHG
BACTERIA UR QL AUTO: ABNORMAL /HPF
BASOPHILS # BLD AUTO: 0.01 THOUSANDS/ÂΜL (ref 0–0.1)
BASOPHILS NFR BLD AUTO: 0 % (ref 0–1)
BILIRUB SERPL-MCNC: 2.79 MG/DL (ref 0.2–1)
BNP SERPL-MCNC: 212 PG/ML (ref 0–100)
BSA FOR ECHO PROCEDURE: 2.32 M2
BUN SERPL-MCNC: 24 MG/DL (ref 5–25)
BUN SERPL-MCNC: 25 MG/DL (ref 5–25)
CALCIUM SERPL-MCNC: 9 MG/DL (ref 8.4–10.2)
CALCIUM SERPL-MCNC: 9.6 MG/DL (ref 8.4–10.2)
CARDIAC TROPONIN I PNL SERPL HS: 12 NG/L
CARDIAC TROPONIN I PNL SERPL HS: 31 NG/L
CARDIAC TROPONIN I PNL SERPL HS: 32 NG/L
CHLORIDE SERPL-SCNC: 104 MMOL/L (ref 96–108)
CHLORIDE SERPL-SCNC: 105 MMOL/L (ref 96–108)
CK SERPL-CCNC: 397 U/L (ref 39–308)
CO2 SERPL-SCNC: 19 MMOL/L (ref 21–32)
CO2 SERPL-SCNC: 22 MMOL/L (ref 21–32)
CREAT SERPL-MCNC: 1.04 MG/DL (ref 0.6–1.3)
CREAT SERPL-MCNC: 1.12 MG/DL (ref 0.6–1.3)
CRP SERPL QL: 136.8 MG/L
DOP CALC LVOT PEAK VEL VTI: 15.3 CM
DOP CALC LVOT PEAK VEL: 0.87 M/S
E WAVE DECELERATION TIME: 144 MS
E/A RATIO: 3.15
EOSINOPHIL # BLD AUTO: 0 THOUSAND/ÂΜL (ref 0–0.61)
EOSINOPHIL NFR BLD AUTO: 0 % (ref 0–6)
ERYTHROCYTE [DISTWIDTH] IN BLOOD BY AUTOMATED COUNT: 15.4 % (ref 11.6–15.1)
ERYTHROCYTE [DISTWIDTH] IN BLOOD BY AUTOMATED COUNT: 15.4 % (ref 11.6–15.1)
ERYTHROCYTE [SEDIMENTATION RATE] IN BLOOD: 46 MM/HOUR (ref 0–19)
FRACTIONAL SHORTENING: 24 (ref 28–44)
GFR SERPL CREATININE-BSD FRML MDRD: 59 ML/MIN/1.73SQ M
GFR SERPL CREATININE-BSD FRML MDRD: 65 ML/MIN/1.73SQ M
GLUCOSE SERPL-MCNC: 110 MG/DL (ref 65–140)
GLUCOSE SERPL-MCNC: 116 MG/DL (ref 65–140)
HCT VFR BLD AUTO: 33.7 % (ref 36.5–49.3)
HCT VFR BLD AUTO: 35.1 % (ref 36.5–49.3)
HGB BLD-MCNC: 11 G/DL (ref 12–17)
HGB BLD-MCNC: 11.5 G/DL (ref 12–17)
IMM GRANULOCYTES # BLD AUTO: 0.05 THOUSAND/UL (ref 0–0.2)
IMM GRANULOCYTES NFR BLD AUTO: 1 % (ref 0–2)
INR PPP: 2.49 (ref 0.85–1.19)
INR PPP: 2.57 (ref 0.85–1.19)
INR PPP: 2.71 (ref 0.85–1.19)
INTERVENTRICULAR SEPTUM IN DIASTOLE (PARASTERNAL SHORT AXIS VIEW): 1.2 CM
INTERVENTRICULAR SEPTUM: 1.2 CM (ref 0.6–1.1)
LA/AORTA RATIO 2D: 1.7
LAAS-AP2: 39.9 CM2
LAAS-AP4: 36.7 CM2
LEFT ATRIUM SIZE: 6.3 CM
LEFT ATRIUM VOLUME (MOD BIPLANE): 172 ML
LEFT ATRIUM VOLUME INDEX (MOD BIPLANE): 74.1 ML/M2
LEFT INTERNAL DIMENSION IN SYSTOLE: 3.2 CM (ref 2.1–4)
LEFT VENTRICLE DIASTOLIC VOLUME (MOD BIPLANE): 91 ML
LEFT VENTRICLE DIASTOLIC VOLUME INDEX (MOD BIPLANE): 39.2 ML/M2
LEFT VENTRICLE SYSTOLIC VOLUME (MOD BIPLANE): 41 ML
LEFT VENTRICLE SYSTOLIC VOLUME INDEX (MOD BIPLANE): 17.7 ML/M2
LEFT VENTRICULAR INTERNAL DIMENSION IN DIASTOLE: 4.2 CM (ref 3.5–6)
LEFT VENTRICULAR POSTERIOR WALL IN END DIASTOLE: 1.2 CM
LEFT VENTRICULAR STROKE VOLUME: 38 ML
LV EF: 55 %
LVSV (TEICH): 38 ML
LYMPHOCYTES # BLD AUTO: 0.8 THOUSANDS/ÂΜL (ref 0.6–4.47)
LYMPHOCYTES NFR BLD AUTO: 13 % (ref 14–44)
MAGNESIUM SERPL-MCNC: 2.2 MG/DL (ref 1.9–2.7)
MCH RBC QN AUTO: 30.8 PG (ref 26.8–34.3)
MCH RBC QN AUTO: 30.9 PG (ref 26.8–34.3)
MCHC RBC AUTO-ENTMCNC: 32.6 G/DL (ref 31.4–37.4)
MCHC RBC AUTO-ENTMCNC: 32.8 G/DL (ref 31.4–37.4)
MCV RBC AUTO: 94 FL (ref 82–98)
MCV RBC AUTO: 95 FL (ref 82–98)
MONOCYTES # BLD AUTO: 0.9 THOUSAND/ÂΜL (ref 0.17–1.22)
MONOCYTES NFR BLD AUTO: 15 % (ref 4–12)
MUCOUS THREADS UR QL AUTO: ABNORMAL
MV E'TISSUE VEL-SEP: 9 CM/S
MV PEAK A VEL: 0.41 M/S
MV PEAK E VEL: 129 CM/S
MV STENOSIS PRESSURE HALF TIME: 42 MS
MV VALVE AREA P 1/2 METHOD: 5.24
NEUTROPHILS # BLD AUTO: 4.35 THOUSANDS/ÂΜL (ref 1.85–7.62)
NEUTS SEG NFR BLD AUTO: 71 % (ref 43–75)
NON-SQ EPI CELLS URNS QL MICRO: ABNORMAL /HPF
NRBC BLD AUTO-RTO: 0 /100 WBCS
PLATELET # BLD AUTO: 106 THOUSANDS/UL (ref 149–390)
PLATELET # BLD AUTO: 107 THOUSANDS/UL (ref 149–390)
PMV BLD AUTO: 8.9 FL (ref 8.9–12.7)
PMV BLD AUTO: 9.3 FL (ref 8.9–12.7)
POTASSIUM SERPL-SCNC: 4 MMOL/L (ref 3.5–5.3)
POTASSIUM SERPL-SCNC: 4.2 MMOL/L (ref 3.5–5.3)
PROT SERPL-MCNC: 7.2 G/DL (ref 6.4–8.4)
PROTHROMBIN TIME: 27.6 SECONDS (ref 12.3–15)
PROTHROMBIN TIME: 28.2 SECONDS (ref 12.3–15)
PROTHROMBIN TIME: 29.5 SECONDS (ref 12.3–15)
RBC # BLD AUTO: 3.56 MILLION/UL (ref 3.88–5.62)
RBC # BLD AUTO: 3.73 MILLION/UL (ref 3.88–5.62)
RBC #/AREA URNS AUTO: ABNORMAL /HPF
RIGHT VENTRICLE ID DIMENSION: 4.7 CM
SL CV LV EF: 55
SL CV PED ECHO LEFT VENTRICLE DIASTOLIC VOLUME (MOD BIPLANE) 2D: 80 ML
SL CV PED ECHO LEFT VENTRICLE SYSTOLIC VOLUME (MOD BIPLANE) 2D: 42 ML
SODIUM SERPL-SCNC: 134 MMOL/L (ref 135–147)
SODIUM SERPL-SCNC: 136 MMOL/L (ref 135–147)
TR MAX PG: 25 MMHG
TR PEAK VELOCITY: 2.5 M/S
TRICUSPID ANNULAR PLANE SYSTOLIC EXCURSION: 2.3 CM
TRICUSPID VALVE PEAK REGURGITATION VELOCITY: 2.48 M/S
URATE SERPL-MCNC: 5.3 MG/DL (ref 3.5–8.5)
WBC # BLD AUTO: 5.63 THOUSAND/UL (ref 4.31–10.16)
WBC # BLD AUTO: 6.11 THOUSAND/UL (ref 4.31–10.16)
WBC #/AREA URNS AUTO: ABNORMAL /HPF

## 2024-08-02 PROCEDURE — 76705 ECHO EXAM OF ABDOMEN: CPT

## 2024-08-02 PROCEDURE — 73201 CT UPPER EXTREMITY W/DYE: CPT

## 2024-08-02 PROCEDURE — 84484 ASSAY OF TROPONIN QUANT: CPT | Performed by: INTERNAL MEDICINE

## 2024-08-02 PROCEDURE — 93306 TTE W/DOPPLER COMPLETE: CPT

## 2024-08-02 PROCEDURE — 80048 BASIC METABOLIC PNL TOTAL CA: CPT | Performed by: INTERNAL MEDICINE

## 2024-08-02 PROCEDURE — 85027 COMPLETE CBC AUTOMATED: CPT | Performed by: INTERNAL MEDICINE

## 2024-08-02 PROCEDURE — 99223 1ST HOSP IP/OBS HIGH 75: CPT | Performed by: PSYCHIATRY & NEUROLOGY

## 2024-08-02 PROCEDURE — 84550 ASSAY OF BLOOD/URIC ACID: CPT | Performed by: NURSE PRACTITIONER

## 2024-08-02 PROCEDURE — 70551 MRI BRAIN STEM W/O DYE: CPT

## 2024-08-02 PROCEDURE — 82550 ASSAY OF CK (CPK): CPT | Performed by: NURSE PRACTITIONER

## 2024-08-02 PROCEDURE — 84484 ASSAY OF TROPONIN QUANT: CPT | Performed by: EMERGENCY MEDICINE

## 2024-08-02 PROCEDURE — 99223 1ST HOSP IP/OBS HIGH 75: CPT | Performed by: INTERNAL MEDICINE

## 2024-08-02 PROCEDURE — 36415 COLL VENOUS BLD VENIPUNCTURE: CPT | Performed by: EMERGENCY MEDICINE

## 2024-08-02 PROCEDURE — 84145 PROCALCITONIN (PCT): CPT | Performed by: INTERNAL MEDICINE

## 2024-08-02 PROCEDURE — 83735 ASSAY OF MAGNESIUM: CPT | Performed by: INTERNAL MEDICINE

## 2024-08-02 PROCEDURE — 97110 THERAPEUTIC EXERCISES: CPT

## 2024-08-02 PROCEDURE — 97535 SELF CARE MNGMENT TRAINING: CPT

## 2024-08-02 PROCEDURE — 93306 TTE W/DOPPLER COMPLETE: CPT | Performed by: INTERNAL MEDICINE

## 2024-08-02 PROCEDURE — 97163 PT EVAL HIGH COMPLEX 45 MIN: CPT

## 2024-08-02 PROCEDURE — 85610 PROTHROMBIN TIME: CPT | Performed by: INTERNAL MEDICINE

## 2024-08-02 PROCEDURE — 97167 OT EVAL HIGH COMPLEX 60 MIN: CPT

## 2024-08-02 RX ORDER — DILTIAZEM HYDROCHLORIDE 240 MG/1
240 CAPSULE, COATED, EXTENDED RELEASE ORAL DAILY
COMMUNITY

## 2024-08-02 RX ORDER — TAMSULOSIN HYDROCHLORIDE 0.4 MG/1
0.4 CAPSULE ORAL
Status: DISCONTINUED | OUTPATIENT
Start: 2024-08-02 | End: 2024-08-06 | Stop reason: HOSPADM

## 2024-08-02 RX ORDER — WARFARIN SODIUM 2.5 MG/1
2.5 TABLET ORAL
Status: DISCONTINUED | OUTPATIENT
Start: 2024-08-02 | End: 2024-08-06 | Stop reason: HOSPADM

## 2024-08-02 RX ORDER — ACETAMINOPHEN 325 MG/1
650 TABLET ORAL EVERY 6 HOURS PRN
Status: DISCONTINUED | OUTPATIENT
Start: 2024-08-02 | End: 2024-08-06 | Stop reason: HOSPADM

## 2024-08-02 RX ORDER — ATORVASTATIN CALCIUM 20 MG/1
20 TABLET, FILM COATED ORAL DAILY
COMMUNITY

## 2024-08-02 RX ORDER — ATORVASTATIN CALCIUM 20 MG/1
20 TABLET, FILM COATED ORAL DAILY
Status: DISCONTINUED | OUTPATIENT
Start: 2024-08-02 | End: 2024-08-06 | Stop reason: HOSPADM

## 2024-08-02 RX ORDER — KETOROLAC TROMETHAMINE 30 MG/ML
15 INJECTION, SOLUTION INTRAMUSCULAR; INTRAVENOUS ONCE
Status: DISCONTINUED | OUTPATIENT
Start: 2024-08-02 | End: 2024-08-03

## 2024-08-02 RX ORDER — DILTIAZEM HYDROCHLORIDE 240 MG/1
240 CAPSULE, COATED, EXTENDED RELEASE ORAL DAILY
Status: DISCONTINUED | OUTPATIENT
Start: 2024-08-02 | End: 2024-08-06 | Stop reason: HOSPADM

## 2024-08-02 RX ORDER — FUROSEMIDE 40 MG/1
40 TABLET ORAL DAILY
Status: DISCONTINUED | OUTPATIENT
Start: 2024-08-02 | End: 2024-08-02

## 2024-08-02 RX ORDER — FUROSEMIDE 10 MG/ML
40 INJECTION INTRAMUSCULAR; INTRAVENOUS DAILY
Status: DISCONTINUED | OUTPATIENT
Start: 2024-08-02 | End: 2024-08-05

## 2024-08-02 RX ORDER — COLCHICINE 0.6 MG/1
0.6 TABLET ORAL DAILY
Status: DISCONTINUED | OUTPATIENT
Start: 2024-08-02 | End: 2024-08-06 | Stop reason: HOSPADM

## 2024-08-02 RX ORDER — WARFARIN SODIUM 2.5 MG/1
2.5 TABLET ORAL
COMMUNITY

## 2024-08-02 RX ORDER — TAMSULOSIN HYDROCHLORIDE 0.4 MG/1
0.4 CAPSULE ORAL
COMMUNITY

## 2024-08-02 RX ORDER — FUROSEMIDE 40 MG/1
40 TABLET ORAL DAILY
COMMUNITY

## 2024-08-02 RX ADMIN — Medication 2 G: at 22:00

## 2024-08-02 RX ADMIN — COLCHICINE 0.6 MG: 0.6 TABLET ORAL at 13:41

## 2024-08-02 RX ADMIN — FUROSEMIDE 40 MG: 10 INJECTION, SOLUTION INTRAMUSCULAR; INTRAVENOUS at 03:29

## 2024-08-02 RX ADMIN — ATORVASTATIN CALCIUM 20 MG: 20 TABLET, FILM COATED ORAL at 11:31

## 2024-08-02 RX ADMIN — IOHEXOL 100 ML: 350 INJECTION, SOLUTION INTRAVENOUS at 03:03

## 2024-08-02 RX ADMIN — WARFARIN SODIUM 2.5 MG: 2.5 TABLET ORAL at 18:26

## 2024-08-02 RX ADMIN — DILTIAZEM HYDROCHLORIDE 240 MG: 240 CAPSULE, COATED, EXTENDED RELEASE ORAL at 11:31

## 2024-08-02 RX ADMIN — TAMSULOSIN HYDROCHLORIDE 0.4 MG: 0.4 CAPSULE ORAL at 18:26

## 2024-08-02 NOTE — ASSESSMENT & PLAN NOTE
At the time of this patient's admitted today a CT head was done and as noted by both radiology and on our review there is a finding consistent with suspected NPH and this was compared with his films from 2017.  The patient's exam and history was difficult today as he is markedly hard of hearing he lives alone and there was 1 family member with him who tried to supply history today.  He can be further evaluated for consideration of NPH as an outpatient.

## 2024-08-02 NOTE — ASSESSMENT & PLAN NOTE
Wt Readings from Last 3 Encounters:   08/21/17 117 kg (257 lb 4 oz)   04/21/17 112 kg (247 lb 2 oz)   11/25/16 117 kg (257 lb 2 oz)     Lower extremities edema worsened  , however patient is obese which can be falsely low  Lasix IV 40 mg daily  Follow-up echo

## 2024-08-02 NOTE — PHYSICAL THERAPY NOTE
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 No pertinent past medical history.    Past Surgical History  History reviewed. No pertinent surgical history.     08/02/24 0742   PT Last Visit   PT Visit Date 08/02/24   Note Type   Note type Evaluation and Treatment   Pain Assessment   Pain Assessment Tool FLACC   Pain Location/Orientation Location: Generalized;Orientation: Left;Orientation: Upper;Location: Arm   Pain Onset/Description Onset: Ongoing   Hospital Pain Intervention(s) Repositioned;Ambulation/increased activity;Elevated;Emotional support   Pain Rating: FLACC (Rest) - Face 1   Pain Rating: FLACC (Rest) - Legs 0   Pain Rating: FLACC (Rest) - Activity 1   Pain Rating: FLACC (Rest) - Cry 1   Pain Rating: FLACC (Rest) - Consolability 0   Score: FLACC (Rest) 3   Pain Rating: FLACC (Activity) - Face 1   Pain Rating: FLACC (Activity) - Legs 1   Pain Rating: FLACC (Activity) - Activity 1   Pain Rating: FLACC (Activity) - Cry 1   Pain Rating: FLACC (Activity) - Consolability 1   Score: FLACC (Activity) 5   Restrictions/Precautions   Weight Bearing Precautions Per Order No  (maintained NWB on L UE secondary to pain and swelling)   Other Precautions Cognitive;Chair Alarm;Bed Alarm;Limb alert;O2;Fall Risk;Pain;Hard of hearing  (+2L O2 via NC)   Home Living   Type of Home House   Home Layout One  "level;Able to live on main level with bedroom/bathroom;Performs ADLs on one level;Stairs to enter with rails  (14 [address])   Bathroom Shower/Tub Tub/shower unit   Bathroom Toilet Standard   Bathroom Equipment Grab bars in shower   Bathroom Accessibility Accessible   Home Equipment Cane   Additional Comments Pt ambulates without an AD; has been using a cane for the past 2 weeks.   Prior Function   Level of [place] Independent with functional mobility;Independent with ADLs;Independent with IADLS   Lives With Alone   Receives Help From Family   IADLs Independent with driving;Independent with meal prep;Independent with medication management   Falls in the last 6 months 0   Vocational Retired   General   Family/Caregiver Present Yes   Cognition   Overall Cognitive Status Impaired   Arousal/Participation Alert   Orientation Level Oriented to person;Oriented to place;Disoriented to time  (inconsistent to situation)   Memory Decreased recall of recent events;Decreased short term memory   Following Commands Follows one step commands without difficulty   Comments Pt agreeable to PT.   Subjective   Subjective \"I need to go to the bathroom.\"   RLE Assessment   RLE Assessment X   Strength RLE   RLE Overall Strength 3/5   LLE Assessment   LLE Assessment X   Strength LLE   LLE Overall Strength 3/5   Light Touch   RLE Light Touch Grossly intact   LLE Light Touch Grossly intact   Bed Mobility   Rolling R 2  Maximal assistance   Additional items Assist x 2;Bedrails;Increased time required;Verbal cues;LE management   Rolling L 2  Maximal assistance   Additional items Assist x 2;Bedrails;Increased time required;Verbal cues;LE management   Supine to Sit 2  Maximal assistance   Additional items Assist x 2;HOB elevated;Bedrails;Increased time required;Verbal cues;LE management   Sit to Supine 2  Maximal assistance   Additional items Assist x 2;Increased time required;Verbal cues;LE management   Transfers   Sit to Stand 2  Maximal " assistance   Additional items Assist x 2;Increased time required;Verbal cues   Stand to Sit 2  Maximal assistance   Additional items Assist x 2;Increased time required;Verbal cues   Additional Comments Attempted to perform 6 sit to stand transfers with and without use of any steady; pt unable to clear buttocks from EOB   Balance   Static Sitting Fair   Dynamic Sitting Fair -   Static Standing Zero   Endurance Deficit   Endurance Deficit Yes   Endurance Deficit Description decreased activity tolerance; pt requiring supplemental oxygen; pt was received on 2L O2 via NC; SpO2 stable   Activity Tolerance   Activity Tolerance Patient tolerated treatment well   Medical Staff Made Aware OT Fahad  (Co-evaluation performed with OT secondary to complex medical condition of patient and regression of functional status from baseline. PT/OT goals were addressed separately.)   Nurse Made Aware RN Stacie   Assessment   Prognosis Good   Problem List Decreased strength;Decreased endurance;Decreased mobility;Impaired balance;Decreased cognition;Pain   Assessment Pt is 84 year old male seen for PT evaluation s/p admit to [facility] on 8/1/2024 with leg swelling. PT consulted to assess pt's functional mobility and discharge needs. Order placed for PT evaluation and treatment, with up and out of bed as tolerated order. Comorbidities affecting pt's physical performance at time of assessment include generalized weakness, chronic acquired lymphedema, CHF, atrial fibrillation, pain and swelling of left upper extremity, altered mental status, elevated liver enzymes, and suspected normal pressure hydrocephalus on CT of head. Prior to hospitalization, pt was independent with all functional mobility without an AD. Pt has been using a cane for the last two weeks. Pt ambulates household and community distances. Pt resides alone, in a one level house with fourteen steps to enter. Personal factors affecting pt at time of initial evaluation  include stairs to enter home, inability to ambulate household distances, inability to ambulate community distances, inability to navigate level surfaces without external assistance, unable to perform dynamic tasks in the community, inability to live alone, difficulty performing ADLs, inability to perform IADLs, hearing impairments, and limited insight into impairments. Please find objective findings from PT assessment regarding body systems outlined above with impairments and limitations including weakness, impaired balance, decreased endurance, gait deviations, pain, decreased activity tolerance, decreased functional mobility tolerance, fall risk, and decreased cognition. The following objective measures were performed on initial evaluation Barthel Index: 35/100, Modified Enma: 4 (moderate/severe disability), and AM-PAC 6-Clicks: 6/24. Pt's clinical presentation is currently unstable/unpredictable seen in pt's presentation of need for ongoing medical management/monitoring, pt is a fall risk, and pt requires cues and assist of two for safety with functional mobility. Pt to benefit from continued PT treatment to address deficits as defined above and maximize pt's level of function and independence with mobility. From a PT standpoint, recommendation at time of discharge would be level 1, maximum resource intensity in order to facilitate return to prior level of function.   Barriers to Discharge Inaccessible home environment;Decreased caregiver support   Goals   STG Expiration Date 08/12/24   Short Term Goal #1 In 10 days: Increase bilateral LE strength 1/2 grade to facilitate independent mobility, Perform all bed mobility tasks with min A of 1 to decrease caregiver burden, Increase static sitting and dynamic sitting balance 1/2 grade to decrease risk for falls, and PT to see and establish goals for transfers, gait, and standing balance when appropriate   PT Treatment Day 1   Plan   Treatment/Interventions Functional  transfer training;LE strengthening/ROM;Therapeutic exercise;Endurance training;Cognitive reorientation;Patient/family training;Bed mobility;Continued evaluation;Spoke to nursing;OT;Family   PT Frequency 3-5x/wk   Discharge Recommendation   Rehab Resource Intensity Level, PT I (Maximum Resource Intensity)   AM-PAC Basic Mobility Inpatient   Turning in Flat Bed Without Bedrails 1   Lying on Back to Sitting on Edge of Flat Bed Without Bedrails 1   Moving Bed to Chair 1   Standing Up From Chair Using Arms 1   Walk in Room 1   Climb 3-5 Stairs With Railing 1   Basic Mobility Inpatient Raw Score 6   Turning Head Towards Sound 4   Follow Simple Instructions 3   Low Function Basic Mobility Raw Score  13   Low Function Basic Mobility Standardized Score  20.14   [facility] Highest Level Of Mobility   -[facility] Goal 2: Bed activities/Dependent transfer   -[facility] Achieved 3: Sit at edge of bed   Modified Enma Scale   Modified [place] Scale 4   Barthel Index   Feeding 5   Bathing 0   Grooming Score 0   Dressing Score 5   Bladder Score 5   Bowels Score 10   Toilet Use Score 5   Transfers (Bed/Chair) Score 5   Mobility (Level Surface) Score 0   Stairs Score 0   Barthel Index Score 35   Additional Treatment Session   Start Time 0811   End Time 0828   Treatment Assessment Pt agreeable to PT treatment session following PT evaluation. Pt performed supine therapeutic exercise as indicated below. Pt required verbal cues and minimal tactile cues for correct technique and form. Pt tolerated therapeutic exercise well without complaints of pain. Pt also participated in additional bed mobility: rolling x3 trials for use of bedpan with max assist of two. Pt continues to exhibit decreased lower extremity strength, impaired balance, decreased endurance, and decreased functional mobility. PT to continue to recommend level 1, maximum resource intensity. PT to continue to follow and treat as appropriate.   Exercises   Quad Sets Supine;10  reps;AROM;Bilateral   Hip Abduction Supine;10 reps;AAROM;AROM;Bilateral   Ankle Pumps Supine;10 reps;AROM;Bilateral   End of Consult   Patient Position at End of Consult Supine;Bed/Chair alarm activated;All needs within reach     PT Evaluation Time: 0742-0810    PT Treatment Time: 0811-0828  17 minutes  April Robins, PT, DPT

## 2024-08-02 NOTE — ASSESSMENT & PLAN NOTE
Total bilirubin 2.79  No abdominal pain, the rest of the LFTs within normal limits  Will check right upper quadrant ultrasound  Continue monitoring

## 2024-08-02 NOTE — PLAN OF CARE
Problem: OCCUPATIONAL THERAPY ADULT  Goal: Performs self-care activities at highest level of function for planned discharge setting.  See evaluation for individualized goals.  Description: Treatment Interventions: ADL retraining, Functional transfer training, UE strengthening/ROM, Endurance training, Cognitive reorientation, Patient/family training          See flowsheet documentation for full assessment, interventions and recommendations.   Outcome: Progressing  Note: Limitation: Decreased ADL status, Decreased UE ROM, Decreased UE strength, Decreased cognition, Decreased endurance     Assessment: Pt is a 84 y.o. male seen for OT evaluation s/p admit to [facility] on 8/1/2024 w/ AMS.  Comorbidities affecting pt's functional performance at time of assessment include: obesity, limited hearing, previous surgery, and CHF. Personal factors affecting pt at time of IE include:steps to enter environment, limited home support, difficulty performing ADLS, difficulty performing IADLS , limited insight into deficits, and health management . Prior to admission, pt was reportedly independent with all ADLs and functional mobility with use of SPC for the past 2 weeks. Upon evaluation: Pt requires Maximal Assistance x2 for all mobility and Maximal Assistance to total assistance for self care 2* the following deficits impacting occupational performance: decreased ROM, decreased strength, decreased balance, decreased tolerance, impaired FMC, impaired attention, impaired memory, impaired sequencing, impaired problem solving, increased pain, and increased edema . Pt to benefit from continued skilled OT tx while in the hospital to address deficits as defined above and maximize level of functional independence w ADL's and functional mobility. Occupational Performance areas to address include: grooming, bathing/shower, toilet hygiene, dressing, and functional mobility. From OT standpoint, recommendation at time of d/c would be Level 1 Maximum  Resource Intensity.     Rehab Resource Intensity Level, OT: I (Maximum Resource Intensity)        Fahad Squires, OTR/L

## 2024-08-02 NOTE — ED PROVIDER NOTES
History  Chief Complaint   Patient presents with    Leg Swelling     Pt at home called [facility] couldn't get out of chair from leg swelling and r hand swelling deep pitting edema     84-year-old male with a past medical history of A-fib on Coumadin, hypertension, hyperlipidemia, CHF, severe TERESO, prediabetes, pulmonary hypertension, and obesity who was brought in by EMS for evaluation with generalized weakness and confusion.  Per step-son, the patient did not answer his phone earlier this evening, which he states is unusual for the patient.  They went to check on the patient, and were unable to get him to sit up or stand up even with 2 people helping him.  They also stated that he seemed more confused than normal, and he was saying that he was seeing people in his house who were not there.  The patient has chronic leg swelling, but the step-son states that this has been worsening over the past few weeks.  They also note that his left hand is swollen and appears red, which they state has happened previously but the patient had never been evaluated for it before.  The patient denies any chest pain, shortness of breath, abdominal pain, nausea, vomiting, or other concerning symptoms.        None       No past medical history on file.    No past surgical history on file.    No family history on file.  I have reviewed and agree with the history as documented.    No existing history information found.  No existing history information found.       Review of Systems   Constitutional:  Negative for fever.   Respiratory:  Negative for shortness of breath.    Cardiovascular:  Positive for leg swelling. Negative for chest pain.   Gastrointestinal:  Negative for abdominal pain, nausea and vomiting.   Musculoskeletal:  Positive for joint swelling (left hand).   Neurological:  Positive for weakness.   Psychiatric/Behavioral:  Positive for confusion and hallucinations.    All other systems reviewed and are negative.      Physical  "Exam  Physical Exam  Vitals and nursing note reviewed.   Constitutional:       General: He is awake. He is not in acute distress.     Appearance: He is not toxic-appearing.   HENT:      Head: Normocephalic and atraumatic.   Eyes:      General: Vision grossly intact. Gaze aligned appropriately.   Cardiovascular:      Rate and Rhythm: Tachycardia present. Rhythm irregular.      Heart sounds: Normal heart sounds.   Pulmonary:      Effort: Pulmonary effort is normal. No respiratory distress.   Abdominal:      General: Abdomen is protuberant.      Palpations: Abdomen is soft.      Tenderness: There is no abdominal tenderness.   Musculoskeletal:      Left hand: Swelling present. Decreased range of motion. Normal sensation. Normal pulse.      Cervical back: Full passive range of motion without pain and neck supple.      Right lower leg: 3+ Edema present.      Left lower leg: 3+ Edema present.      Comments: Left hand is swollen, erythematous, and warm to the touch.  Patient has decreased range of motion of the hand and fingers.  He endorses some discomfort to the touch.    Skin:     General: Skin is warm and dry.   Neurological:      General: No focal deficit present.      Mental Status: He is alert.      Comments: Patient oriented to self, place, and time, but does intermittently seem confused.  Patient reported seeing people in his house earlier who were \"laughing and having a good time\".  1 of these people that the patient saw was his other step-son, who is currently in [place].         Vital Signs  ED Triage Vitals   Temperature Pulse Respirations Blood Pressure SpO2   08/01/24 2259 08/01/24 2259 08/01/24 2259 08/01/24 2306 08/01/24 2259   99.3 °F (37.4 °C) (!) 117 17 145/73 93 %      Temp Source Heart Rate Source Patient Position - Orthostatic VS BP Location FiO2 (%)   08/01/24 2259 08/01/24 2259 08/01/24 2259 08/01/24 2259 --   Oral Monitor Sitting Right arm       Pain Score       --                  Vitals:    " 08/01/24 2306 08/01/24 2315 08/02/24 0015 08/02/24 0045   BP: 145/73 148/70 (!) 137/110 162/75   Pulse:  (!) 119 (!) 114 (!) 117   Patient Position - Orthostatic VS:  Sitting Sitting          Visual Acuity      ED Medications  Medications - No data to display    Diagnostic Studies  Results Reviewed       Procedure Component Value Units Date/Time    HS Troponin I 2hr [[id number]] Collected: 08/02/24 0126    Lab Status: In process Specimen: Blood from Arm, Right Updated: 08/02/24 0128    Comprehensive metabolic panel [[id number]]  (Abnormal) Collected: 08/01/24 2325    Lab Status: Final result Specimen: Blood from Arm, Right Updated: 08/02/24 0110     Sodium 136 mmol/L      Potassium 4.2 mmol/L      Chloride 104 mmol/L      CO2 19 mmol/L      ANION GAP 13 mmol/L      BUN 25 mg/dL      Creatinine 1.12 mg/dL      Glucose 116 mg/dL      Calcium 9.6 mg/dL      AST 25 U/L      ALT 11 U/L      Alkaline Phosphatase 79 U/L      Total Protein 7.2 g/dL      Albumin 4.1 g/dL      Total Bilirubin 2.79 mg/dL      eGFR 59 ml/min/1.73sq m     Narrative:      National Kid[employer] Disease Foundation guidelines for Chronic Kidney Disease (CKD):     Stage 1 with normal or high GFR (GFR > 90 mL/min/1.73 square meters)    Stage 2 Mild CKD (GFR = 60-89 mL/min/1.73 square meters)    Stage 3A Moderate CKD (GFR = 45-59 mL/min/1.73 square meters)    Stage 3B Moderate CKD (GFR = 30-44 mL/min/1.73 square meters)    Stage 4 Severe CKD (GFR = 15-29 mL/min/1.73 square meters)    Stage 5 End Stage CKD (GFR <15 mL/min/1.73 square meters)  Note: GFR calculation is accurate only with a steady state creatinine    C-reactive protein [[id number]]  (Abnormal) Collected: 08/01/24 2325    Lab Status: Final result Specimen: Blood from Arm, Right Updated: 08/02/24 0110     .8 mg/L     B-Type Natriuretic Peptide(BNP) [[id number]]  (Abnormal) Collected: 08/01/24 2329    Lab Status: Final result Specimen: Blood from Arm, Right Updated: 08/02/24 0033       pg/mL     Sedimentation rate, automated [[id number]]  (Abnormal) Collected: 08/01/24 2325    Lab Status: Final result Specimen: Blood from Arm, Right Updated: 08/02/24 0011     Sed Rate 46 mm/hour     HS Troponin 0hr (reflex protocol) [[id number]]  (Normal) Collected: 08/01/24 2325    Lab Status: Final result Specimen: Blood from Arm, Right Updated: 08/02/24 0010     hs TnI 0hr 12 ng/L     HS Troponin I 4hr [[id number]]     Lab Status: No result Specimen: Blood     Protime-INR [[id number]]  (Abnormal) Collected: 08/01/24 2329    Lab Status: Final result Specimen: Blood from Arm, Right Updated: 08/02/24 0005     Protime 27.6 seconds      INR 2.49    Narrative:      INR Therapeutic Range    Indication                                             INR Range      Atrial Fibrillation                                               2.0-3.0  Hypercoagulable State                                    2.0.2.3  Left Ventricular Asist Device                            2.0-3.0  Mechanical Heart Valve                                  -    Aortic(with afib, MI, embolism, HF, LA enlargement,    and/or coagulopathy)                                     2.0-3.0 (2.5-3.5)     Mitral                                                             2.5-3.5  Prosthetic/Bioprosthetic Heart Valve               2.0-3.0  Venous thromboembolism (VTE: VT, PE        2.0-3.0    Urine Microscopic [[id number]]  (Abnormal) Collected: 08/01/24 2325    Lab Status: Final result Specimen: Urine, Clean Catch Updated: 08/02/24 0002     RBC, UA 4-10 /hpf      WBC, UA 1-2 /hpf      Epithelial Cells Occasional /hpf      Bacteria, UA None Seen /hpf      MUCUS THREADS Occasional    CBC and differential [[id number]]  (Abnormal) Collected: 08/01/24 2325    Lab Status: Final result Specimen: Blood from Arm, Right Updated: 08/02/24 0001     WBC 6.11 Thousand/uL      RBC 3.73 Million/uL      Hemoglobin 11.5 g/dL      Hematocrit 35.1 %      MCV 94 fL      MCH 30.8 pg      MCHC 32.8  g/dL      RDW 15.4 %      MPV 9.3 fL      Platelets 107 Thousands/uL      nRBC 0 /100 WBCs      Segmented % 71 %      Immature Grans % 1 %      Lymphocytes % 13 %      Monocytes % 15 %      Eosinophils Relative 0 %      Basophils Relative 0 %      Absolute Neutrophils 4.35 Thousands/µL      Absolute Immature Grans 0.05 Thousand/uL      Absolute Lymphocytes 0.80 Thousands/µL      Absolute Monocytes 0.90 Thousand/µL      Eosinophils Absolute 0.00 Thousand/µL      Basophils Absolute 0.01 Thousands/µL     UA w Reflex to Microscopic w Reflex to Culture [[id number]]  (Abnormal) Collected: 08/01/24 2325    Lab Status: Final result Specimen: Urine, Clean Catch Updated: 08/01/24 2355     Color, UA Yellow     Clarity, UA Clear     Specific Gravity, UA 1.021     pH, UA 5.5     Leukocytes, UA Negative     Nitrite, UA Negative     Protein, UA 70 (1+) mg/dl      Glucose, UA Negative mg/dl      Ketones, UA Negative mg/dl      Urobilinogen, UA <2.0 mg/dl      Bilirubin, UA Negative     Occult Blood, UA Small                   CT head without contrast   Final Result by Rohit Adams DO (08/02 0042)      No acute intracranial abnormality is seen.      Prominence of the ventricles out of proportion for volume loss raising suspicion for possible normal pressure hydrocephalus. Correlation with the patient's symptoms recommended.                     Workstation performed: [id number]         XR hand 3+ views LEFT    (Results Pending)   XR chest 1 view portable    (Results Pending)              Procedures  Procedures         ED Course  ED Course as of 08/02/24 0133   Thu Aug 01, 2024   2331 Pulse(!): 119   Fri Aug 02, 2024   0019 POCT INR(!): 2.49   0110 Total Bilirubin(!): 2.79   0110 C-REACTIVE PROTEIN(!): 136.8                                               Medical Decision Making  Amount and/or Complexity of Data Reviewed  Labs: ordered. Decision-making details documented in ED Course.  Radiology: ordered.                  Disposition  Final diagnoses:   Hallucinations   Generalized weakness   Lower extremity edema   Swelling of left hand   Elevated C-reactive protein (CRP)   Abnormal CT scan of head     Time reflects when diagnosis was documented in both MDM as applicable and the Disposition within this note       Time User Action Codes Description Comment    8/2/2024  1:18 AM Roy, Marley Add [R44.3] Hallucinations     8/2/2024  1:18 AM Roy, Marley Add [R53.1] Generalized weakness     8/2/2024  1:18 AM Roy, Marley Add [R60.0] Lower extremity edema     8/2/2024  1:18 AM Roy, Marley Add [M79.89] Swelling of left hand     8/2/2024  1:19 AM Roy, Marley Modify [R44.3] Hallucinations     8/2/2024  1:19 AM Roy, Marley Modify [R53.1] Generalized weakness     8/2/2024  1:19 AM Roy, Marley Add [R79.82] Elevated C-reactive protein (CRP)     8/2/2024  1:32 AM Edouard Royica Add [R93.0] Abnormal CT scan of head           ED Disposition       ED Disposition   Admit    Condition   Stable    Date/Time   Fri Aug 2, 2024  1:33 AM    Comment   Case was discussed with LILA and the patient's admission status was agreed to be Admission Status: inpatient status to the service of Dr. Zaldivar.               Follow-up Information    None         Patient's Medications    No medications on file       No discharge procedures on file.    PDMP Review       None            ED Provider  Electronically Signed by

## 2024-08-02 NOTE — OCCUPATIONAL THERAPY NOTE
Occupational Therapy Evaluation and Treatment      Patient Name: Antonio Duncan  Today's Date: 8/2/2024    Additional OT treatment time: 0820-0828    Problem List  Active Problems:    Generalized weakness    Chronic acquired lymphedema    CHF (congestive heart failure) (HCC)    Atrial fibrillation (HCC)    Pain and swelling of left upper extremity    Altered mental status    Elevated liver enzymes    Suspected Normal pressure hydrocephalus on CT head    Past Medical History  History reviewed. No pertinent past medical history.  Past Surgical History  History reviewed. No pertinent surgical history.        08/02/24 0810   OT Last Visit   OT Visit Date 08/02/24   Note Type   Note type Evaluation  (and Treatment)   Pain Assessment   Pain Assessment Tool FLACC   Pain Location/Orientation Location: Generalized;Orientation: Left;Location: Arm  (kailey knees)   Pain Onset/Description Onset: Ongoing   Hospital Pain Intervention(s) Repositioned;Ambulation/increased activity   Pain Rating: FLACC (Rest) - Face 1   Pain Rating: FLACC (Rest) - Legs 0   Pain Rating: FLACC (Rest) - Activity 1   Pain Rating: FLACC (Rest) - Cry 1   Pain Rating: FLACC (Rest) - Consolability 0   Score: FLACC (Rest) 3   Pain Rating: FLACC (Activity) - Face 1   Pain Rating: FLACC (Activity) - Legs 1   Pain Rating: FLACC (Activity) - Activity 1   Pain Rating: FLACC (Activity) - Cry 1   Pain Rating: FLACC (Activity) - Consolability 1   Score: FLACC (Activity) 5   Restrictions/Precautions   Weight Bearing Precautions Per Order No  (maintained NWB on L UE secondary to pain and swelling)   Braces or Orthoses   (none reported)   Other Precautions Cognitive;Chair Alarm;Bed Alarm;Multiple lines;Fall Risk;Pain;Hard of hearing;O2  (2L O2 NC)   Home Living   Type of Home House   Home Layout One level;Able to live on main level with bedroom/bathroom;Performs ADLs on one level;Stairs to enter with rails  ([address]   Bathroom Shower/Tub Tub/shower unit    Bathroom Toilet Standard   Bathroom Equipment Grab bars in shower   Bathroom Accessibility Accessible   Home Equipment Cane   Prior Function   Level of [place] Independent with ADLs;Independent with functional mobility;Independent with IADLS  (ambulates without AD. Recently using SPC the past 2 weeks)   Lives With Alone   Receives Help From Family   IADLs Independent with driving;Independent with meal prep;Independent with medication management   Falls in the last 6 months 0   Vocational Retired   General   Family/Caregiver Present Yes   Subjective   Subjective Pt pleasantly confused at times, but agreeable to therapy evaluation   ADL   Where Assessed Other (Comment)  (Assist levels for some self care tasks are based on functional assessment of performance skills and deficits observed during session.)   Eating Assistance 4  Minimal Assistance   Eating Deficit Setup   Grooming Assistance 4  Minimal Assistance   Grooming Deficit Setup;Supervision/safety;Increased time to complete   UB Bathing Assistance 3  Moderate Assistance   UB Bathing Deficit Setup;Supervision/safety;Increased time to complete   LB Bathing Assistance 2  Maximal Assistance   LB Bathing Deficit Setup;Supervision/safety;Increased time to complete   UB Dressing Assistance 3  Moderate Assistance   UB Dressing Deficit Setup;Supervision/safety;Increased time to complete  (seated)   LB Dressing Assistance 1  Total Assistance   Toileting Assistance  1  Total Assistance   Bed Mobility   Rolling R 2  Maximal assistance   Additional items Assist x 2;Bedrails;Increased time required;Verbal cues;LE management   Rolling L 2  Maximal assistance   Additional items Assist x 2;Bedrails;Increased time required;Verbal cues;LE management   Supine to Sit 2  Maximal assistance   Additional items Assist x 2;HOB elevated;Bedrails;Increased time required;Verbal cues;LE management   Sit to Supine 2  Maximal assistance   Additional items Assist x 2;Increased time  required;Verbal cues;LE management   Transfers   Sit to Stand 2  Maximal assistance   Additional items Assist x 2;Increased time required;Verbal cues   Stand to Sit 2  Maximal assistance   Additional items Assist x 2;Increased time required;Verbal cues   Stand pivot Unable to assess   Additional Comments attempted 6 sit to stand transfers with and without use of Aziza Stedy. LUE not utilized and remained NWB 2/2 pain and swelling. Pt was unable to clear buttocks from bed during trials. Recommend bed level activities vs xavier lift for OOB with nursing staff.   Functional Mobility   Functional Mobility 2  Maximal assistance   Additional Comments assist x2   Activity Tolerance   Activity Tolerance Patient tolerated treatment well   Medical Staff Made Aware April PT   Nurse Made Aware Stacie LEVY   RUE Assessment   RUE Assessment WFL   LUE Assessment   LUE Assessment   (ROM limited 2/2 pain and swelling. MMT not performed)   Edema   LUE Edema   (edema present)   Hand Function   Gross Motor Coordination Functional   Fine Motor Coordination   (impaired L hand)   Vision-Basic Assessment   Current Vision Wears glasses all the time   Psychosocial   Psychosocial (WDL) WDL   Cognition   Overall Cognitive Status Impaired   Arousal/Participation Alert;Responsive;Cooperative   Attention Attends with cues to redirect   Orientation Level Oriented to person;Oriented to place;Disoriented to time  (inconsistently oriented to situation)   Memory Decreased recall of recent events;Decreased recall of precautions;Decreased short term memory   Following Commands Follows one step commands without difficulty   Assessment   Limitation Decreased ADL status;Decreased UE ROM;Decreased UE strength;Decreased cognition;Decreased endurance   Assessment Pt is a 84 y.o. male seen for OT evaluation s/p admit to [facility] on 8/1/2024 w/ AMS.  Comorbidities affecting pt's functional performance at time of assessment include: obesity, limited hearing, previous  surgery, and CHF. Personal factors affecting pt at time of IE include:steps to enter environment, limited home support, difficulty performing ADLS, difficulty performing IADLS , limited insight into deficits, and health management . Prior to admission, pt was reportedly independent with all ADLs and functional mobility with use of SPC for the past 2 weeks. Upon evaluation: Pt requires Maximal Assistance x2 for all mobility and Maximal Assistance to total assistance for self care 2* the following deficits impacting occupational performance: decreased ROM, decreased strength, decreased balance, decreased tolerance, impaired FMC, impaired attention, impaired memory, impaired sequencing, impaired problem solving, increased pain, and increased edema . Pt to benefit from continued skilled OT tx while in the hospital to address deficits as defined above and maximize level of functional independence w ADL's and functional mobility. Occupational Performance areas to address include: grooming, bathing/shower, toilet hygiene, dressing, and functional mobility. From OT standpoint, recommendation at time of d/c would be Level 1 Maximum Resource Intensity.   Goals   Patient Goals to get to the bathroom   Plan   Treatment Interventions ADL retraining;Functional transfer training;UE strengthening/ROM;Endurance training;Cognitive reorientation;Patient/family training   Goal Expiration Date 08/16/24   OT Treatment Day 0   OT Frequency 3-5x/wk   Discharge Recommendation   Rehab Resource Intensity Level, OT I (Maximum Resource Intensity)   AM-PAC Daily Activity Inpatient   Lower Body Dressing 1   Bathing 2   Toileting 1   Upper Body Dressing 2   Grooming 3   Eating 3   Daily Activity Raw Score 12   Daily Activity Standardized Score (Calc for Raw Score >=11) 30.6   AM-PAC Applied Cognition Inpatient   Following a Speech/Presentation 2   Understanding Ordinary Conversation 3   Taking Medications 2   Remembering Where Things Are Placed or  Put Away 1   Remembering List of 4-5 Errands 1   Taking Care of Complicated Tasks 1   Applied Cognition Raw Score 10   Applied Cognition Standardized Score 24.98   Additional Treatment Session   Start Time 0820   End Time 0828   Treatment Assessment Pt seen this date for skilled OT session focused on ADLs, functional transfers and mobility, safety education. The patient was received supine in bed, NAD, on RA, supportive family member at the bed side. He participated in functional bed mobility, sit<>stand attempts with 2 person assist with with Aziza Stedy, and toileting this date. Pt attempted 6 sit to stand transfers first with 2 person assist and then with Aziza Stedy. Pt was unable to clear buttocks from bed. Pt also required use of bed pan x2 during session, and was assisted with bed mobility with Maximal Assistance x2 with verbal cues and required total assistance for toileting. At end of session the patient was located supine in bed with call bell in reach and all needs met. Overall the patient remains well below his functional baseline, and is primarily limited at this time due to generalized deconditioning, pain, and impaired cognition. OT will continue to follow while acute to address POC. At this time, recommend Level 1 Maximum Resource Intensity upon d/c.   Additional Treatment Day 1       Goals: to be met by 8/16/24     Patient will perform functional bed mobility with Moderate Assistance x1, with use of hospital bed functions   Patient will perform functional transfers with Moderate Assistance x1 using LRAD in preparation for ADL tasks, with good safety awareness  Patient will perform UB dressing task with Minimal Assistance while seated, with set up  Patient will perform LB dressing task with Moderate Assistance  Patient will perform toilet transfer with Moderate Assistance x1 to C  Patient will perform toileting with Moderate Assistance, including hygiene and clothing management   Patient will  tolerate ROM and UE HEP of LUE to reduce edema and increase ROM for participation in ADL activities  Patient will improve activity tolerance by participating in 25 minutes of session at a time in preparation for participation in ADL tasks  Patient will identify 3 potential fall hazards and identify compensatory techniques to decrease fall risk in the home environment  Patient will attend to 100% of cognitive task during session          Fahad Squires, OTR/L

## 2024-08-02 NOTE — CASE MANAGEMENT
Case Management Assessment & Discharge Planning Note    Patient name Antonio Duncan  Location /-01 MRN [id number]  : 1940 Date 2024       Current Admission Date: 2024  Current Admission Diagnosis:Generalized weakness   Patient Active Problem List    Diagnosis Date Noted Date Diagnosed    Generalized weakness 2024     Chronic acquired lymphedema 2024     CHF (congestive heart failure) (HCC) 2024     Atrial fibrillation (HCC) 2024     Pain and swelling of left upper extremity 2024     Altered mental status 2024     Elevated liver enzymes 2024     Radiographic suspicion normal pressure hydrocephalus on CT head 2024       LOS (days): 0  Geometric Mean LOS (GMLOS) (days): 3.9  Days to GMLOS:3.4     OBJECTIVE:    Risk of Unplanned Readmission Score: 6.36         Current admission status: Inpatient       Preferred Pharmacy:   [id number] - [facility] [place] - [address]  [address]  [facility] [place] [address]  Phone: [phone] Fax: [phone]    Primary Care Provider: Rudi Garza MD    Primary Insurance: MEDICARE  Secondary Insurance:  FOR LIFE    ASSESSMENT:  Active Health Care Proxies    There are no active Health Care Proxies on file.       Advance Directives  Does patient have a Health Care POA?: No  Was patient offered paperwork?: Yes (as per step son all, pt declined in past)  Does patient currently have a Health Care decision maker?: No  Does patient have Advance Directives?: No  Was patient offered paperwork?: Yes (as per step son all, pt declined in past)  Primary Contact: All Bhakta (Son)  [phone]         Readmission Root Cause  30 Day Readmission: No    Patient Information  Admitted from:: Home  Mental Status: Alert, Confused  During Assessment patient was accompanied by: Not accompanied during assessment  Assessment information provided by:: Son  Primary Caregiver:  Self  Support Systems: Self, Son, Friend  County of Residence: [place]  What city do you live in?: [place]  Home entry access options. Select all that apply.: Stairs  Number of steps to enter home.: One Flight  Do the steps have railings?: Yes  Type of Current Residence: [facility]  Living Arrangements: Lives Alone  Is patient a ?: Yes  Is patient active with [facility]: Yes  Is patient service connected?: Yes (30% connected)    Activities of Daily Living Prior to Admission  Functional Status: Independent  Completes ADLs independently?: Yes  Ambulates independently?: Yes  Does patient use assisted devices?: No  Does patient currently own DME?: Yes  What DME does the patient currently own?: Straight Cane, CPAP  Does patient have a history of Outpatient Therapy (PT/OT)?: No  Does the patient have a history of Short-Term Rehab?: Yes (dont recall 10 years ago)  Does patient have a history of HHC?: No  Does patient currently have HHC?: No         Patient Information Continued  Income Source: Pension/penitentiary  Does patient have prescription coverage?: Yes  Does patient receive dialysis treatments?: No  Does patient have a history of substance abuse?: No  Does patient have a history of Mental Health Diagnosis?: No         Means of Transportation  Means of Transport to Appts:: Drives Self      Social Determinants of Health (SDOH)      Flowsheet Row Most Recent Value   Housing Stability    In the last 12 months, was there a time when you were not able to pay the mortgage or rent on time? N   In the past 12 months, how many times have you moved where you were living? 0   At any time in the past 12 months, were you homeless or living in a shelter (including now)? N   Transportation Needs    In the past 12 months, has lack of transportation kept you from medical appointments or from getting medications? no   In the past 12 months, has lack of transportation kept you from meetings, work, or from getting things  needed for daily living? No   Food Insecurity    Within the past 12 months, you worried that your food would run out before you got the money to buy more. Never true   Within the past 12 months, the food you bought just didn't last and you didn't have money to get more. Never true   Utilities    In the past 12 months has the electric, gas, oil, or water company threatened to shut off services in your home? No            DISCHARGE DETAILS:    Discharge planning discussed with:: Pt step son Vj  Freedom of Choice: Yes  Comments - Freedom of Choice: CM met with pt at bedside who appeared confused during visit. CM called and spoke with Vj [phone] and introduced self/role. FOC discussed with pt step son at length. CM informed family that pt has a Level 1 reccomendation. [place] referral made for [facility] placement. Pt son wishes to have pt choice list emailed once available. CM continues to follow and assist with pt dc plans.  CM contacted family/caregiver?: Yes  Were Treatment Team discharge recommendations reviewed with patient/caregiver?: Yes  Did patient/caregiver verbalize understanding of patient care needs?: Yes  Were patient/caregiver advised of the risks associated with not following Treatment Team discharge recommendations?: Yes    Contacts  Patient Contacts: Vj Bhakta (Son)  Relationship to Patient:: Family  Contact Method: Phone  Phone Number: [phone] (  Reason/Outcome: Continuity of Care, Emergency Contact, Referral, Discharge Planning    Requested Home Health Care         Is the patient interested in HHC at discharge?: No    DME Referral Provided  Referral made for DME?: No    Other Referral/Resources/Interventions Provided:  Interventions: Short Term Rehab    Would you like to participate in our [facility] service program?  : No - Declined    Treatment Team Recommendation: Short Term Rehab  Discharge Destination Plan:: Short Term Rehab  Transport at Discharge : Stretcher  sabine  Dispatcher Contacted: No

## 2024-08-02 NOTE — ASSESSMENT & PLAN NOTE
This patient who neurology saw for complaints of altered mental status and possible hallucination in his home which now seem to have resolved however aside from his right upper extremity in his proximal left upper extremity he remains markedly limited with generalized weakness throughout.  There was no 1 particular area which suggested an ischemic neurologic source.  We will however get an MRI to rule out any possible stroke or infarct.  I suspect this gentleman will need postacute rehab and may have difficulty returning to his home situation.

## 2024-08-02 NOTE — ASSESSMENT & PLAN NOTE
This patient has a apparently long history of A-fib.  He is currently managed by cardiology and he is on warfarin reportedly at a dose of 2.5 mg daily.  Review of his INR in our facility at the time of admission demonstrates that he has been therapeutic.  He has drawn monthly through the [facility] system and they appear to be a therapeutic in the range of 2.4.  We would not suggest any changes at this time we will get an MRI to rule out any infarct as the cause of the weakness that his family found.

## 2024-08-02 NOTE — ASSESSMENT & PLAN NOTE
X-ray left upper extremities with no acute findings  Inflammatory markers elevated  Patient has history of chronic arthritis which has been managed with NSAIDs, patient states that the symptoms were similar in the lower extremity and now is very similar to previous presentation and left arm  Will give dose of Toradol  Follow-up CT with contrast of left hand  Rheumatology consult

## 2024-08-02 NOTE — ASSESSMENT & PLAN NOTE
Rate controlled  Continue Cardizem to 40 mg daily  Patient is on warfarin 2.5 mg daily  Daily INR was arranged 2-3

## 2024-08-02 NOTE — H&P
VTE Pharmacologic Prophylaxis:   Moderate Risk (Score 3-4) - Pharmacological DVT Prophylaxis Ordered: warfarin (Coumadin).  Code Status: Level 1 - Full Code confirmed with patient  Discussion with family: Updated  (son) at bedside.    Anticipated Length of Stay: Patient will be admitted on an inpatient basis with an anticipated length of stay of greater than 2 midnights secondary to altered mental status.    Total Time Spent on Date of Encounter in care of patient: 45 mins. This time was spent on one or more of the following: performing physical exam; counseling and coordination of care; obtaining or reviewing history; documenting in the medical record; reviewing/ordering tests, medications or procedures; communicating with other healthcare professionals and discussing with patient's family/caregivers.    Chief Complaint: Lf hand pain    History of Present Illness:  Antonio Duncan is a 84 y.o. male with a PMH of  of A-fib on Coumadin, hypertension, hyperlipidemia, CHF, severe TERESO, prediabetes, pulmonary hypertension, and obesity who was brought in by for evaluation with generalized weakness and confusion.  Patient appears alert that oriented on my evaluation however not able to recall of why he ended up here. As per son at the bedsite the patient did not answer his phone earlier for the past couple of days, which he states is unusual for the patient.  They went to check on the patient, and were unable to get him to sit up or stand up even with 2 people helping him. AT the baseline pt is able to walk without assistance. Son also reports that pt had episodes of hallucinations when he  thought he was in [place]. As per son pt's chronic lymphedema of LE appeared worse today. Pt also c/o Lf arm swelling, redness and tenderness.  Patient has history of chronic arthritis which has been managed with NSAIDs, patient states that the symptoms were similar in the lower extremity and now is very similar to previous  presentation and left arm.   CT head: No acute intracranial abnormality is seen.  Prominence of the ventricles out of proportion for volume loss raising suspicion for possible normal pressure hydrocephalus. Correlation with the patient's symptoms recommended.  UA negative, electrolytes within normal limits  CT head: No acute intracranial abnormality is seen.  Prominence of the ventricles out of proportion for volume loss raising suspicion for possible normal pressure hydrocephalus. Correlation with the patient's symptoms recommended.  Comparing to CT head back on 2017 this findings are new  On my evaluation patient appears not in acute distress hemodynamically stable.  On my evaluation patient appears alert and oriented x 4.   Pt denies any chest pain, shortness of breath, abdominal pain, nausea, vomiting, or other concerning symptoms.             Review of Systems:  Review of Systems   Constitutional:  Positive for chills. Negative for activity change, appetite change, diaphoresis, fatigue and fever.   HENT: Negative.     Respiratory: Negative.     Cardiovascular:  Positive for leg swelling. Negative for chest pain and palpitations.   Gastrointestinal: Negative.    Genitourinary: Negative.    Musculoskeletal:  Positive for arthralgias and joint swelling.        Lf hand swelling, redness, tenderness   Skin:         Lf hand swelling, redness, tenderness     Neurological: Negative.    Psychiatric/Behavioral:  Positive for hallucinations.        Past Medical and Surgical History:   No past medical history on file.    No past surgical history on file.    Meds/Allergies:  Prior to Admission medications    Medication Sig Start Date End Date Taking? Authorizing Provider   atorvastatin (LIPITOR) 20 mg tablet Take 20 mg by mouth daily    Historical Provider, MD   diltiazem (CARDIZEM CD) 240 mg 24 hr capsule Take 240 mg by mouth daily    Historical Provider, MD   furosemide (LASIX) 40 mg tablet Take 40 mg by mouth daily     Historical Provider, MD   tamsulosin (FLOMAX) 0.4 mg Take 0.4 mg by mouth daily with dinner    Historical Provider, MD   warfarin (COUMADIN) 2.5 mg tablet Take 2.5 mg by mouth daily    Historical Provider, MD ROGERS have reviewed home medications with patient personally.    Allergies:   Allergies   Allergen Reactions    Sulfa Antibiotics Rash       Social History:  Marital Status:    Occupation:   Patient Pre-hospital Living Situation: Home  Patient Pre-hospital Level of Mobility: walks  Patient Pre-hospital Diet Restrictions:   Substance Use History:   Social History     Substance and Sexual Activity   Alcohol Use Not on file     Social History     Tobacco Use   Smoking Status Not on file   Smokeless Tobacco Not on file     Social History     Substance and Sexual Activity   Drug Use Not on file       Family History:      Physical Exam:     Vitals:   Blood Pressure: 122/83 (08/02/24 0234)  Pulse: (!) 116 (08/02/24 0234)  Temperature: 98.5 °F (36.9 °C) (08/02/24 0234)  Temp Source: Oral (08/01/24 5577)  Respirations: 18 (08/02/24 0234)  SpO2: (!) 88 % (08/02/24 0234)    Physical Exam  Constitutional:       General: He is not in acute distress.     Appearance: He is not ill-appearing, toxic-appearing or diaphoretic.   HENT:      Head: Normocephalic and atraumatic.      Mouth/Throat:      Mouth: Mucous membranes are moist.   Eyes:      Pupils: Pupils are equal, round, and reactive to light.   Cardiovascular:      Rate and Rhythm: Normal rate. Rhythm irregular.      Pulses: Normal pulses.      Heart sounds: Normal heart sounds. No murmur heard.     No friction rub. No gallop.   Pulmonary:      Effort: Pulmonary effort is normal.      Breath sounds: Normal breath sounds.   Abdominal:      General: Abdomen is flat.      Palpations: Abdomen is soft.   Musculoskeletal:      Cervical back: Neck supple.   Skin:     General: Skin is warm.      Capillary Refill: Capillary refill takes less than 2 seconds.      Findings:  Erythema present.      Comments: Lf hand swelling, redness, tenderness     Neurological:      General: No focal deficit present.      Mental Status: He is alert and oriented to person, place, and time. Mental status is at baseline.   Psychiatric:         Mood and Affect: Mood normal.         Behavior: Behavior normal.         Thought Content: Thought content normal.          Additional Data:     Lab Results:  Results from last 7 days   Lab Units 08/01/24  2325   WBC Thousand/uL 6.11   HEMOGLOBIN g/dL 11.5*   HEMATOCRIT % 35.1*   PLATELETS Thousands/uL 107*   SEGS PCT % 71   LYMPHO PCT % 13*   MONO PCT % 15*   EOS PCT % 0     Results from last 7 days   Lab Units 08/01/24  2325   SODIUM mmol/L 136   POTASSIUM mmol/L 4.2   CHLORIDE mmol/L 104   CO2 mmol/L 19*   BUN mg/dL 25   CREATININE mg/dL 1.12   ANION GAP mmol/L 13   CALCIUM mg/dL 9.6   ALBUMIN g/dL 4.1   TOTAL BILIRUBIN mg/dL 2.79*   ALK PHOS U/L 79   ALT U/L 11   AST U/L 25   GLUCOSE RANDOM mg/dL 116     Results from last 7 days   Lab Units 08/01/24  2329   INR  2.49*         Lab Results   Component Value Date    HGBA1C 5.9 (H) 01/04/2024    HGBA1C 5.5 06/23/2023    HGBA1C 6.1 (H) 06/09/2022           Lines/Drains:  Invasive Devices       Peripheral Intravenous Line  Duration             Peripheral IV 08/01/24 Distal;Right;Ventral (anterior) Forearm <1 day    Peripheral IV 08/01/24 Right Antecubital <1 day                        Imaging: Reviewed radiology reports from this admission including: CT head  CT head without contrast   Final Result by Rohit Adams DO (08/02 0042)      No acute intracranial abnormality is seen.      Prominence of the ventricles out of proportion for volume loss raising suspicion for possible normal pressure hydrocephalus. Correlation with the patient's symptoms recommended.                     Workstation performed: [id number]         XR hand 3+ views LEFT    (Results Pending)   XR chest 1 view portable    (Results Pending)    CT upper extremity w contrast left    (Results Pending)   US right upper quadrant    (Results Pending)       EKG and Other Studies Reviewed on Admission:   EKG:     ** Please Note: This note has been constructed using a voice recognition system. **  [facility]  H&P  Name: Antonio Duncan 84 y.o. male I MRN: [id number]  Unit/Bed#: -01 I Date of Admission: 8/1/2024   Date of Service: 8/2/2024 I Hospital Day: 0      Assessment & Plan   Pain and swelling of left upper extremity  Assessment & Plan  X-ray left upper extremities with no acute findings  Inflammatory markers elevated  Patient has history of chronic arthritis which has been managed with NSAIDs, patient states that the symptoms were similar in the lower extremity and now is very similar to previous presentation and left arm  Will give dose of Toradol  Follow-up CT with contrast of left hand  Rheumatology consult    Generalized weakness  Assessment & Plan  PT/OT evaluation   fall precautions      Normal pressure hydrocephalus (HCC)  Assessment & Plan  CT head: No acute intracranial abnormality is seen.  Prominence of the ventricles out of proportion for volume loss raising suspicion for possible normal pressure hydrocephalus. Correlation with the patient's symptoms recommended.  Comparing to CT head back on 2017 this findings are new  F/u neurology consult       Altered mental status  Assessment & Plan  CT head: No acute intracranial abnormality is seen.  Prominence of the ventricles out of proportion for volume loss raising suspicion for possible normal pressure hydrocephalus. Correlation with the patient's symptoms recommended.  UA negative, electrolytes within normal limits  Mental status appears to be improved on my evaluation  Likely secondary to metabolic encephalopathy  Continue monitoring    CHF (congestive heart failure) (HCC)  Assessment & Plan  Wt Readings from Last 3 Encounters:   08/21/17 117 kg (257 lb 4 oz)    04/21/17 112 kg (247 lb 2 oz)   11/25/16 117 kg (257 lb 2 oz)     Lower extremities edema worsened  , however patient is obese which can be falsely low  Lasix IV 40 mg daily  Follow-up echo        Elevated liver enzymes  Assessment & Plan  Total bilirubin 2.79  No abdominal pain, the rest of the LFTs within normal limits  Will check right upper quadrant ultrasound  Continue monitoring    Atrial fibrillation (HCC)  Assessment & Plan  Rate controlled  Continue Cardizem to 40 mg daily  Patient is on warfarin 2.5 mg daily  Daily INR was arranged 2-3    Chronic acquired lymphedema  Assessment & Plan  Continue diuretics  Monitor renal fn

## 2024-08-02 NOTE — PLAN OF CARE
Problem: PHYSICAL THERAPY ADULT  Goal: Performs mobility at highest level of function for planned discharge setting.  See evaluation for individualized goals.  Description: Treatment/Interventions: Functional transfer training, LE strengthening/ROM, Therapeutic exercise, Endurance training, Cognitive reorientation, Patient/family training, Bed mobility, Continued evaluation, Spoke to nursing, OT, Family          See flowsheet documentation for full assessment, interventions and recommendations.  Note: Prognosis: Good  Problem List: Decreased strength, Decreased endurance, Decreased mobility, Impaired balance, Decreased cognition, Pain  Assessment: Pt is 84 year old male seen for PT evaluation s/p admit to [facility] on 8/1/2024 with leg swelling. PT consulted to assess pt's functional mobility and discharge needs. Order placed for PT evaluation and treatment, with up and out of bed as tolerated order. Comorbidities affecting pt's physical performance at time of assessment include generalized weakness, chronic acquired lymphedema, CHF, atrial fibrillation, pain and swelling of left upper extremity, altered mental status, elevated liver enzymes, and suspected normal pressure hydrocephalus on CT of head. Prior to hospitalization, pt was independent with all functional mobility without an AD. Pt has been using a cane for the last two weeks. Pt ambulates household and community distances. Pt resides alone, in a one level house with fourteen steps to enter. Personal factors affecting pt at time of initial evaluation include stairs to enter home, inability to ambulate household distances, inability to ambulate community distances, inability to navigate level surfaces without external assistance, unable to perform dynamic tasks in the community, inability to live alone, difficulty performing ADLs, inability to perform IADLs, hearing impairments, and limited insight into impairments. Please find objective findings  from PT assessment regarding body systems outlined above with impairments and limitations including weakness, impaired balance, decreased endurance, gait deviations, pain, decreased activity tolerance, decreased functional mobility tolerance, fall risk, and decreased cognition. The following objective measures were performed on initial evaluation Barthel Index: 35/100, Modified Enma: 4 (moderate/severe disability), and AM-PAC 6-Clicks: 6/24. Pt's clinical presentation is currently unstable/unpredictable seen in pt's presentation of need for ongoing medical management/monitoring, pt is a fall risk, and pt requires cues and assist of two for safety with functional mobility. Pt to benefit from continued PT treatment to address deficits as defined above and maximize pt's level of function and independence with mobility. From a PT standpoint, recommendation at time of discharge would be level 1, maximum resource intensity in order to facilitate return to prior level of function.    Barriers to Discharge: Inaccessible home environment, Decreased caregiver support     Rehab Resource Intensity Level, PT: I (Maximum Resource Intensity)    See flowsheet documentation for full assessment.

## 2024-08-02 NOTE — CONSULTS
[facility]  Neuro Consult - Name: Antonio Duncan 84 y.o. male   I MRN: [id number] Unit/Bed#: -01 I   Date of Admission: 8/1/2024 Date of Service: 8/2/2024 I Hospital Day: 0    Inpatient consult to Neurology  Consult performed by: ELIZABETH Raygoza  Consult ordered by: Kim Zaldivar MD      Reason for Consult / Principal Problem: Questionable hallucinations and altered mental status  Hx and PE limited by: None  Review of previous medical records was completed.   Family, specifically a son-in-law or son named Rk, was present at the bedside for history and examination and served to see a primary informant given the patient's limited hearing.    Assessment & Plan   Altered mental status  Assessment & Plan  Neurology is asked to see this 84-year-old right-hand-dominant gentleman who apparently was found by his family in distress in his home as he lives alone.  The patient is markedly hard of hearing and this has been limiting our history to also there is some degree of short-term memory loss although he did well with his general cognitive parameters as well as he could hear them within our practice.  His son-in-law is with here also tries to contribute to what he knows to the patient's history that he apparently was having hallucinations concerning another of the patient's sons and 1 or 2 other people, strangers the patient thought were in his house and he was apparently throwing water at them.  Somehow or another the patient's son-in-law arrived over to the patient's house and found him in distress unable to move while he was apparently held up by or draped over to straight back dining room or kitchen chairs.  He was unable to move him or assist him and EMS was called by his report.  This was yesterday when he was brought to our facility.  They both report that the patient did not sleep well last night, and on our exam and conversation limited as it is by his poor hearing in the  room today his family member reports that they feel he is back to baseline, with good conversation, appropriate language, and at least on our exam no evidence of any hallucinations at this time.   He had no suspicious lateralizing weakness or findings on exam although he did have a weak distal left upper extremity which may be related to pain in ears, for an unknown reason and there is no fracture in the hand noted on x-ray yesterday.  We have a limited suspicion for a seizure as he has not had any before he appears to have no nidus that would trigger his seizure, and his CK done yesterday at the time of arrival was only 400.  We will check his B12 and other serologic studies.  His uric acid was within normal limits suggesting that it is not gout in the left upper extremity.  Patient appears to and did have limited mobility on our exam today and apparently per the family member he had significant difficulty even standing up with 2 therapist at the bedside today.  We will check his MRI to rule out any small stroke given he does have a history of A-fib although he appears to be largely compliant and within range.  All delirium precautions would be appropriate moving forward especially hydration, especially as he is now in inpatient elderly patients are less likely to drink spontaneously.    Atrial fibrillation (HCC)  Assessment & Plan  This patient has a apparently long history of A-fib.  He is currently managed by cardiology and he is on warfarin reportedly at a dose of 2.5 mg daily.  Review of his INR in our facility at the time of admission demonstrates that he has been therapeutic.  He has drawn monthly through the [facility] system and they appear to be a therapeutic in the range of 2.4.  We would not suggest any changes at this time we will get an MRI to rule out any infarct as the cause of the weakness that his family found.    Radiographic suspicion normal pressure hydrocephalus on CT head  Assessment &  Plan  At the time of this patient's admitted today a CT head was done and as noted by both radiology and on our review there is a finding consistent with suspected NPH and this was compared with his films from 2017.  The patient's exam and history was difficult today as he is markedly hard of hearing he lives alone and there was 1 family member with him who tried to supply history today.  He can be further evaluated for consideration of NPH as an outpatient.    Pain and swelling of left upper extremity  Assessment & Plan  Red swollen    Generalized weakness  Assessment & Plan  This patient who neurology saw for complaints of altered mental status and possible hallucination in his home which now seem to have resolved however aside from his right upper extremity in his proximal left upper extremity he remains markedly limited with generalized weakness throughout.  There was no 1 particular area which suggested an ischemic neurologic source.  We will however get an MRI to rule out any possible stroke or infarct.  I suspect this gentleman will need postacute rehab and may have difficulty returning to his home situation.           This patient needs to be seen as an outpatient nonurgently in 1 to 2 months.  He could be seen in our [place] office by any of our providers at that location.  He will benefit and likely will be discharged to inpatient physical therapy from acute care.  Within the limits of his workup we do not have any medical changes to suggest at this time.      HPI: Antonio Duncan is a right handed  84 y.o. male who neurology is asked to see after he was brought here to the ED yesterday when his family found him in distress in his apartment.  Apparently he was hallucinating he thought there were other people in the room besides 1 family member he was apparently throwing cups of water at them.  The family member, Rk, who is at the bedside today reports that he found him draped over 2 chairs and  unable to help himself or move.    The patient on today's exam and history anyway does not recall that he was having hallucinations as described by his family yesterday.  He does not recall that he was unable to move..      ROS: 12 system cued query: He reports he has complaints of pain in his left distal upper extremity particularly about the wrist distal forearm as well as the hand.  He reports that he hurts to move it also hurts when it is palpated or manipulated.  There is apparently no known trigger to this pain and weakness.  He otherwise reports he does not remember any falls in his home.  He reports he feels generally comfortable today but thinks that he is weak.  His family member reports that he seems otherwise to be conversant at his baseline.      Historical Information     History reviewed.   From the chart bassem Varela I note a February 2024 visit:   Longstanding persistent atrial fibrillation (HCC)*;   Hyperlipidemia with target LDL less than 70;   HTN, goal below 130/80;   Heart failure with preserved left ventricular function (HFpEF) (HCC);   Aneurysm of ascending aorta without rupture   He has a baseline ejection fraction of 50 to 55% with a mildly dilated left atrium from a 2018 echocardiogram.  He is also noted to have a history significant for obstructive sleep apnea      History reviewed. No pertinent surgical history.    Social History : Patient is a non-smoker no alcohol.  Reportedly his still ambulatory in his home.        Family History: History reviewed. No pertinent family history.      Allergies   Allergen Reactions    Sulfa Antibiotics Rash     Meds:all current active meds have been reviewed and his INR was therapeutic indicating likely compliance with his Coumadin.    Scheduled Meds:  Current Facility-Administered Medications   Medication Dose Route Frequency    acetaminophen  650 mg Oral Q6H PRN    atorvastatin  20 mg Oral Daily    diltiazem  240 mg Oral Daily    furosemide  40 mg  "Intravenous Daily    ketorolac  15 mg Intravenous Once    tamsulosin  0.4 mg Oral Daily With Dinner    warfarin  2.5 mg Oral Daily (warfarin)     PRN Meds:.  acetaminophen      Physical Exam:   Objective   Vitals:Blood pressure 122/83, pulse (!) 112, temperature 98.5 °F (36.9 °C), resp. rate 18, height 5' 10\" (1.778 m), weight 117 kg (257 lb), SpO2 (!) 88%.,Body mass index is 36.88 kg/m².      Patient was examined in bed his son Rk is at the bedside.  General: alert, appears stated age and cooperative  Head: Normocephalic, without obvious abnormality, atraumatic, hearing is limited  Oral exam: lips, mucosa, and tongue moist;   Neck: no carotid bruit,   Lungs: clear to auscultation ant. bilaterally  Heart: regular rate and rhythm, S1, S2 normal, no murmur appreciated,   Abdomen: soft, +BS,     Extremities: With general venous stasis appreciated in the bilateral lower extremities with edema    Neurologic:   Mental status: Alert, oriented, with the exception of who the current president was the patient was able to report the date, August 2 and that it was Friday and that he was in the [facility].  He was able to follow commands provided that they were allowed enough and that he heard them clearly.  There did not appear to be any aphasia there was no dysarthria.  His thought content was generally appropriate again within the limits of his hearing  CN Exam: CANDY, EOM's I, VF full, Gaze conjugate No sensory or motor lateralizations (No PP on face), Hearing markedly diminished, CNIX-XII I B  Motor: full power, RUE, LUE w prox power, Poor power @ L hand Bi and Tri power,  lowers good power prox hips and knees, 5-5 R DF,  good L DF.  Sensory: grossly intact  X 4 limbs, no extinction,   Cerebellar: no gross ataxia or tremor  Upper extremity evaluation  DTR's: Absent Plantars: downgoing B  Gait: walks minimally at home, was unable to stand here with emesis x 2 today.       Lab Results: I have personally reviewed " pertinent reports.  , CBC:   Results from last 7 days   Lab Units 08/02/24  0347 08/01/24  2325   WBC Thousand/uL 5.63 6.11   RBC Million/uL 3.56* 3.73*   HEMOGLOBIN g/dL 11.0* 11.5*   HEMATOCRIT % 33.7* 35.1*   MCV fL 95 94   PLATELETS Thousands/uL 106* 107*   , BMP/CMP:   Results from last 7 days   Lab Units 08/02/24  0348 08/01/24  2325   SODIUM mmol/L 134* 136   POTASSIUM mmol/L 4.0 4.2   CHLORIDE mmol/L 105 104   CO2 mmol/L 22 19*   BUN mg/dL 24 25   CREATININE mg/dL 1.04 1.12   CALCIUM mg/dL 9.0 9.6   AST U/L  --  25   ALT U/L  --  11   ALK PHOS U/L  --  79   EGFR ml/min/1.73sq m 65 59   , Vitamin B12:   , HgBA1C:   , TSH:   , Coagulation:   Results from last 7 days   Lab Units 08/02/24  1215   INR  2.57*   , Lipid Profile:        Imaging Studies: I have personally reviewed pertinent films in PACS  CT of the head:No acute intracranial abnormality is seen.  Prominence of the ventricles out of proportion for volume loss raising suspicion for possible normal pressure hydrocephalus. Correlation with the patient's symptoms recommended.  There is a head CT report, no films, from 2017 which they noted although there was a mild parenchymal volume loss and mild ex vacuo dilation of the lateral and third ventricles they did not feel that there was ventriculomegaly to suggest hydrocephalus.    CT of the left arm: Subcutaneous edema greater dorsally from the wrist to the hand may reflect cellulitis. No discrete abscess or soft tissue gas.  No radiographic evidence for osteomyelitis. Arthropathy as above.    We will asked that the patient undergo MRI brain scan.     EEG, Echo, Pathology, and Other Studies:  He had an echo done here today his ejection fraction is consistent with that noted several years ago on an outpatient scan.  He was noted to have severely dilated left atrium and moderately dilated right.  There was some valvular regurgitation.    Counseling / Coordination of Care  Total time spent today approximately a  total of 60 minutes. Greater than 50% of total time was spent with the patient and / or family counseling and / or coordination of care. A description of the counseling / coordination of care: All of the above was discussed and reviewed with the patient as was possible but more specifically with his son in detail concerning MRI findings, the MRI to be done later in the events of responsible for his admission.  All of their questions were answered within the limits of the workup at this time.      Dictation voice to text software has been used in the creation of this document. Please consider this in light of any contextual or grammatical errors.

## 2024-08-02 NOTE — ASSESSMENT & PLAN NOTE
Neurology is asked to see this 84-year-old right-hand-dominant gentleman who apparently was found by his family in distress in his home as he lives alone.  The patient is markedly hard of hearing and this has been limiting our history to also there is some degree of short-term memory loss although he did well with his general cognitive parameters as well as he could hear them within our practice.  His son-in-law is with here also tries to contribute to what he knows to the patient's history that he apparently was having hallucinations concerning another of the patient's sons and 1 or 2 other people, strangers the patient thought were in his house and he was apparently throwing water at them.  Somehow or another the patient's son-in-law arrived over to the patient's house and found him in distress unable to move while he was apparently held up by or draped over to straight back dining room or kitchen chairs.  He was unable to move him or assist him and EMS was called by his report.  This was yesterday when he was brought to our facility.  They both report that the patient did not sleep well last night, and on our exam and conversation limited as it is by his poor hearing in the room today his family member reports that they feel he is back to baseline, with good conversation, appropriate language, and at least on our exam no evidence of any hallucinations at this time.   He had no suspicious lateralizing weakness or findings on exam although he did have a weak distal left upper extremity which may be related to pain in ears, for an unknown reason and there is no fracture in the hand noted on x-ray yesterday.  We have a limited suspicion for a seizure as he has not had any before he appears to have no nidus that would trigger his seizure, and his CK done yesterday at the time of arrival was only 400.  We will check his B12 and other serologic studies.  His uric acid was within normal limits suggesting that it is not  gout in the left upper extremity.  Patient appears to and did have limited mobility on our exam today and apparently per the family member he had significant difficulty even standing up with 2 therapist at the bedside today.  We will check his MRI to rule out any small stroke given he does have a history of A-fib although he appears to be largely compliant and within range.  All delirium precautions would be appropriate moving forward especially hydration, especially as he is now in inpatient elderly patients are less likely to drink spontaneously.

## 2024-08-02 NOTE — ASSESSMENT & PLAN NOTE
CT head: No acute intracranial abnormality is seen.  Prominence of the ventricles out of proportion for volume loss raising suspicion for possible normal pressure hydrocephalus. Correlation with the patient's symptoms recommended.  UA negative, electrolytes within normal limits  Mental status appears to be improved on my evaluation  Likely secondary to metabolic encephalopathy  Continue monitoring

## 2024-08-02 NOTE — ASSESSMENT & PLAN NOTE
CT head: No acute intracranial abnormality is seen.  Prominence of the ventricles out of proportion for volume loss raising suspicion for possible normal pressure hydrocephalus. Correlation with the patient's symptoms recommended.  Comparing to CT head back on 2017 this findings are new  F/u neurology consult

## 2024-08-03 PROBLEM — R50.9 FEVER: Status: ACTIVE | Noted: 2024-08-03

## 2024-08-03 LAB
ALBUMIN SERPL BCG-MCNC: 3.8 G/DL (ref 3.5–5)
ALP SERPL-CCNC: 79 U/L (ref 34–104)
ALT SERPL W P-5'-P-CCNC: 16 U/L (ref 7–52)
ANA SER QL IA: NEGATIVE
ANION GAP SERPL CALCULATED.3IONS-SCNC: 8 MMOL/L (ref 4–13)
AST SERPL W P-5'-P-CCNC: 26 U/L (ref 13–39)
B BURGDOR IGG+IGM SER QL IA: NEGATIVE
BASOPHILS # BLD AUTO: 0.02 THOUSANDS/ÂΜL (ref 0–0.1)
BASOPHILS NFR BLD AUTO: 0 % (ref 0–1)
BILIRUB SERPL-MCNC: 2.64 MG/DL (ref 0.2–1)
BUN SERPL-MCNC: 31 MG/DL (ref 5–25)
CALCIUM SERPL-MCNC: 9.3 MG/DL (ref 8.4–10.2)
CHLORIDE SERPL-SCNC: 101 MMOL/L (ref 96–108)
CO2 SERPL-SCNC: 27 MMOL/L (ref 21–32)
CREAT SERPL-MCNC: 1.18 MG/DL (ref 0.6–1.3)
EOSINOPHIL # BLD AUTO: 0.02 THOUSAND/ÂΜL (ref 0–0.61)
EOSINOPHIL NFR BLD AUTO: 0 % (ref 0–6)
ERYTHROCYTE [DISTWIDTH] IN BLOOD BY AUTOMATED COUNT: 15.4 % (ref 11.6–15.1)
FLUAV RNA RESP QL NAA+PROBE: NEGATIVE
FLUBV RNA RESP QL NAA+PROBE: NEGATIVE
GFR SERPL CREATININE-BSD FRML MDRD: 56 ML/MIN/1.73SQ M
GLUCOSE SERPL-MCNC: 131 MG/DL (ref 65–140)
HCT VFR BLD AUTO: 35.6 % (ref 36.5–49.3)
HGB BLD-MCNC: 11.5 G/DL (ref 12–17)
IMM GRANULOCYTES # BLD AUTO: 0.02 THOUSAND/UL (ref 0–0.2)
IMM GRANULOCYTES NFR BLD AUTO: 0 % (ref 0–2)
INR PPP: 2.55 (ref 0.85–1.19)
LYMPHOCYTES # BLD AUTO: 0.74 THOUSANDS/ÂΜL (ref 0.6–4.47)
LYMPHOCYTES NFR BLD AUTO: 12 % (ref 14–44)
MCH RBC QN AUTO: 30.9 PG (ref 26.8–34.3)
MCHC RBC AUTO-ENTMCNC: 32.3 G/DL (ref 31.4–37.4)
MCV RBC AUTO: 96 FL (ref 82–98)
MONOCYTES # BLD AUTO: 0.74 THOUSAND/ÂΜL (ref 0.17–1.22)
MONOCYTES NFR BLD AUTO: 12 % (ref 4–12)
NEUTROPHILS # BLD AUTO: 4.43 THOUSANDS/ÂΜL (ref 1.85–7.62)
NEUTS SEG NFR BLD AUTO: 76 % (ref 43–75)
NRBC BLD AUTO-RTO: 0 /100 WBCS
PLATELET # BLD AUTO: 117 THOUSANDS/UL (ref 149–390)
PMV BLD AUTO: 9.5 FL (ref 8.9–12.7)
POTASSIUM SERPL-SCNC: 3.8 MMOL/L (ref 3.5–5.3)
PROCALCITONIN SERPL-MCNC: 1.08 NG/ML
PROT SERPL-MCNC: 6.9 G/DL (ref 6.4–8.4)
PROTHROMBIN TIME: 28.1 SECONDS (ref 12.3–15)
RBC # BLD AUTO: 3.72 MILLION/UL (ref 3.88–5.62)
RSV RNA RESP QL NAA+PROBE: NEGATIVE
SARS-COV-2 RNA RESP QL NAA+PROBE: NEGATIVE
SODIUM SERPL-SCNC: 136 MMOL/L (ref 135–147)
WBC # BLD AUTO: 5.97 THOUSAND/UL (ref 4.31–10.16)

## 2024-08-03 PROCEDURE — 97110 THERAPEUTIC EXERCISES: CPT

## 2024-08-03 PROCEDURE — 0241U HB NFCT DS VIR RESP RNA 4 TRGT: CPT | Performed by: INTERNAL MEDICINE

## 2024-08-03 PROCEDURE — 99233 SBSQ HOSP IP/OBS HIGH 50: CPT | Performed by: INTERNAL MEDICINE

## 2024-08-03 PROCEDURE — 86618 LYME DISEASE ANTIBODY: CPT | Performed by: INTERNAL MEDICINE

## 2024-08-03 PROCEDURE — 86200 CCP ANTIBODY: CPT | Performed by: INTERNAL MEDICINE

## 2024-08-03 PROCEDURE — 92610 EVALUATE SWALLOWING FUNCTION: CPT | Performed by: SPEECH-LANGUAGE PATHOLOGIST

## 2024-08-03 PROCEDURE — 97530 THERAPEUTIC ACTIVITIES: CPT

## 2024-08-03 PROCEDURE — 86430 RHEUMATOID FACTOR TEST QUAL: CPT | Performed by: INTERNAL MEDICINE

## 2024-08-03 PROCEDURE — 85610 PROTHROMBIN TIME: CPT | Performed by: INTERNAL MEDICINE

## 2024-08-03 PROCEDURE — 97535 SELF CARE MNGMENT TRAINING: CPT

## 2024-08-03 PROCEDURE — 87040 BLOOD CULTURE FOR BACTERIA: CPT | Performed by: INTERNAL MEDICINE

## 2024-08-03 PROCEDURE — 86038 ANTINUCLEAR ANTIBODIES: CPT | Performed by: INTERNAL MEDICINE

## 2024-08-03 PROCEDURE — 86431 RHEUMATOID FACTOR QUANT: CPT | Performed by: INTERNAL MEDICINE

## 2024-08-03 PROCEDURE — 85025 COMPLETE CBC W/AUTO DIFF WBC: CPT | Performed by: INTERNAL MEDICINE

## 2024-08-03 PROCEDURE — 80053 COMPREHEN METABOLIC PANEL: CPT | Performed by: INTERNAL MEDICINE

## 2024-08-03 RX ORDER — CEFAZOLIN SODIUM 2 G/50ML
2000 SOLUTION INTRAVENOUS EVERY 8 HOURS
Status: DISCONTINUED | OUTPATIENT
Start: 2024-08-03 | End: 2024-08-05

## 2024-08-03 RX ORDER — METRONIDAZOLE 500 MG/100ML
500 INJECTION, SOLUTION INTRAVENOUS EVERY 8 HOURS
Status: DISCONTINUED | OUTPATIENT
Start: 2024-08-03 | End: 2024-08-05

## 2024-08-03 RX ADMIN — METRONIDAZOLE 500 MG: 500 INJECTION, SOLUTION INTRAVENOUS at 10:34

## 2024-08-03 RX ADMIN — WARFARIN SODIUM 2.5 MG: 2.5 TABLET ORAL at 17:46

## 2024-08-03 RX ADMIN — FUROSEMIDE 40 MG: 10 INJECTION, SOLUTION INTRAMUSCULAR; INTRAVENOUS at 10:08

## 2024-08-03 RX ADMIN — CEFAZOLIN SODIUM 2000 MG: 2 SOLUTION INTRAVENOUS at 10:08

## 2024-08-03 RX ADMIN — METRONIDAZOLE 500 MG: 500 INJECTION, SOLUTION INTRAVENOUS at 17:45

## 2024-08-03 RX ADMIN — Medication 2 G: at 21:49

## 2024-08-03 RX ADMIN — Medication 2 G: at 17:55

## 2024-08-03 RX ADMIN — ATORVASTATIN CALCIUM 20 MG: 20 TABLET, FILM COATED ORAL at 10:08

## 2024-08-03 RX ADMIN — DILTIAZEM HYDROCHLORIDE 240 MG: 240 CAPSULE, COATED, EXTENDED RELEASE ORAL at 10:08

## 2024-08-03 RX ADMIN — TAMSULOSIN HYDROCHLORIDE 0.4 MG: 0.4 CAPSULE ORAL at 17:46

## 2024-08-03 RX ADMIN — CEFAZOLIN SODIUM 2000 MG: 2 SOLUTION INTRAVENOUS at 17:41

## 2024-08-03 RX ADMIN — COLCHICINE 0.6 MG: 0.6 TABLET ORAL at 10:08

## 2024-08-03 RX ADMIN — ACETAMINOPHEN 650 MG: 325 TABLET, FILM COATED ORAL at 00:55

## 2024-08-03 NOTE — ASSESSMENT & PLAN NOTE
X-ray left upper extremities with no acute findings  Inflammatory markers elevated  Patient does have a history of arthritis, unclear type, at some point someone question rheumatoid  Follow RF and antibodies  Continue local Voltaren gel can consider steroid course as well

## 2024-08-03 NOTE — ASSESSMENT & PLAN NOTE
Documented history of some confusion at home as per family.  The patient could have had acute metabolic encephalopathy given his poor cognitive reserve and now with fever and arthritis.  Neurological exam nonfocal despite CT and MRI findings.  Monitor

## 2024-08-03 NOTE — PHYSICAL THERAPY NOTE
Physical Therapy Treatment  Patient's Name: Antonio Duncan    Admitting Diagnosis  Hallucinations [R44.3]  Elevated C-reactive protein (CRP) [R79.82]  Lower extremity edema [R60.0]  Leg swelling [M79.89]  Abnormal CT scan of head [R93.0]  Generalized weakness [R53.1]  Swelling of left hand [M79.89]    Problem List  Patient Active Problem List   Diagnosis    Generalized weakness    Chronic acquired lymphedema    CHF (congestive heart failure) (HCC)    Atrial fibrillation (HCC)    Pain and swelling of left upper extremity    Altered mental status    Elevated liver enzymes    Radiographic suspicion normal pressure hydrocephalus on CT head    Fever       Past Medical History  History reviewed. No pertinent past medical history.    Past Surgical History  History reviewed. No pertinent surgical history.       08/03/24 1105   PT Last Visit   PT Visit Date 08/03/24   Note Type   Note Type Treatment   Pain Assessment   Pain Assessment Tool Mcpherson-Baker FACES   Pain Score 3   Pain Rating: FLACC (Rest) - Face 1   Pain Rating: FLACC (Rest) - Legs 0   Pain Rating: FLACC (Rest) - Activity 1   Pain Rating: FLACC (Rest) - Cry 1   Pain Rating: FLACC (Rest) - Consolability 0   Score: FLACC (Rest) 3   Pain Rating: FLACC (Activity) - Face 1   Pain Rating: FLACC (Activity) - Legs 1   Pain Rating: FLACC (Activity) - Activity 1   Pain Rating: FLACC (Activity) - Cry 1   Pain Rating: FLACC (Activity) - Consolability 1   Score: FLACC (Activity) 5   Restrictions/Precautions   Weight Bearing Precautions Per Order No   Other Precautions Chair Alarm;Bed Alarm;Fall Risk;Pain;Cognitive;Hard of hearing   General   Chart Reviewed Yes   Family/Caregiver Present No   Cognition   Overall Cognitive Status Impaired   Arousal/Participation Alert;Cooperative   Attention Difficulty attending to directions   Orientation Level Oriented to person;Oriented to place   Memory Decreased recall of recent events   Following Commands Follows one step commands with  "increased time or repetition   Subjective   Subjective \"I need the urinal\"   Bed Mobility   Rolling R 2  Maximal assistance   Additional items Assist x 2;Increased time required;Verbal cues;LE management   Rolling L 2  Maximal assistance   Additional items Assist x 2;Increased time required;Verbal cues;LE management   Supine to Sit 2  Maximal assistance   Additional items Assist x 2;Increased time required;Verbal cues;LE management   Sit to Supine 2  Maximal assistance   Additional items Assist x 2;Increased time required;Verbal cues;LE management   Transfers   Sit to Stand 1  Dependent   Additional Comments Attempted 2 STS without use of any steady with pt. unable to clear buttocks from bed. Very confused with basic commands, requires tactile cues, increased time and repetition for follow through for all activities including exercises, transitions.   Balance   Static Sitting Fair   Dynamic Sitting Fair -   Static Standing Zero   Endurance Deficit   Endurance Deficit Yes   Endurance Deficit Description decreased activity tolerance, SPO2 on RA wfl   Activity Tolerance   Activity Tolerance Patient tolerated treatment well   Medical Staff Made Aware Tejal OT, Co treatment with OT secondary to complex medical condition of pt, mod-max A of 2 required to achieve and maintain transitional movements, requiring the need of skilled therapeutic intervention of 2 therapists to achieve delivery of services.   Exercises   Hip Flexion Sitting;AAROM;Bilateral;10 reps   Knee AROM Long Arc Quad Sitting;10 reps;AAROM;Bilateral   Assessment   Prognosis Good   Problem List Decreased strength;Decreased endurance;Impaired balance;Decreased mobility;Pain;Decreased cognition;Decreased coordination;Impaired hearing   Assessment Pt seen for PT treatment session this date with interventions consisting of Therapeutic exercise consisting of: AAROM 10 reps B LE in sitting position and therapeutic activity consisting of training: bed mobility, " supine<>sit transfers, and sit<>stand transfers; attempted STS transfers x 2 attempts with pt. Unable to clear buttocks off of bed at all. Good participation with exercises however required consistent cueing to complete.  Requires tactile cues, increased time and repetition for follow through for all activities including exercises, transitions. Pt. With some coordination and motor planning deficits noted as well throughout session. Pt agreeable to PT treatment session upon arrival, pt found supine in bed w/ HOB elevated, in no apparent distress. In comparison to previous session, pt with no improvements as evidenced by similar performance to last session, did participate with seated TE . Post session: pt returned BTB. Continue to recommend Maximum Resource Intensity at time of d/c in order to maximize pt's functional independence and safety w/ mobility. Pt continues to be functioning below baseline level, and remains limited 2* factors listed above and including strength, balance, mobility deficits, coordination deficits, decreased activity tolerance. PT will continue to see pt during current hospitalization in order to address the deficits listed above and provide interventions consistent w/ POC in effort to achieve STGs.   Barriers to Discharge Decreased caregiver support;Inaccessible home environment   Goals   Patient Goals to get better   STG Expiration Date 08/12/24   Short Term Goal #1 In 10 days: Increase bilateral LE strength 1/2 grade to facilitate independent mobility, Perform all bed mobility tasks with min A of 1 to decrease caregiver burden, Increase static sitting and dynamic sitting balance 1/2 grade to decrease risk for falls, and PT to see and establish goals for transfers, gait, and standing balance when appropriate   PT Treatment Day 2   Plan   Treatment/Interventions Functional transfer training;LE strengthening/ROM;Therapeutic exercise;Endurance training;Bed mobility;Equipment  eval/education;Patient/family training;Cognitive reorientation;Continued evaluation;OT;Spoke to nursing   Progress No functional improvements   PT Frequency 3-5x/wk   Discharge Recommendation   Rehab Resource Intensity Level, PT I (Maximum Resource Intensity)   AM-PAC Basic Mobility Inpatient   Turning in Flat Bed Without Bedrails 1   Lying on Back to Sitting on Edge of Flat Bed Without Bedrails 1   Moving Bed to Chair 1   Standing Up From Chair Using Arms 1   Walk in Room 1   Climb 3-5 Stairs With Railing 1   Basic Mobility Inpatient Raw Score 6   Turning Head Towards Sound 4   Follow Simple Instructions 3   Low Function Basic Mobility Raw Score  13   Low Function Basic Mobility Standardized Score  20.14   [facility] Highest Level Of Mobility   -HL Goal 2: Bed activities/Dependent transfer   -HLM Achieved 3: Sit at edge of bed         Sirena Graham, PT

## 2024-08-03 NOTE — SPEECH THERAPY NOTE
Speech-Language Pathology Bedside Swallow Evaluation        Patient Name: Antonio Ducnan    Today's Date: 8/3/2024     Problem List  Patient Active Problem List   Diagnosis    Generalized weakness    Chronic acquired lymphedema    CHF (congestive heart failure) (HCC)    Atrial fibrillation (HCC)    Pain and swelling of left upper extremity    Altered mental status    Elevated liver enzymes    Radiographic suspicion normal pressure hydrocephalus on CT head    Fever       Past Medical History  History reviewed. No pertinent past medical history.    Past Surgical History  History reviewed. No pertinent surgical history.      Current Medical Status  Pt is a 84 y.o. male who presented to [facility] with AMS. SLP consult was requested to further assess swallow function. Upon my arrival the patient and RN report he has been tolerating diet/meds without difficulty. He denies any hx of dysphagia. Admits to occasional baseline weight fluctuations and no known prior PNA. Denies sxs of classic GERD- admits to hx of heartburn which resolved with diet change. MRI-     Past medical history:   Please see H&P for details    Special Studies:  8/2 MRI brain: Incomplete examination since patient was not able to complete the study. Only axial diffusion and sagittal T1 images were obtained which are distorted by motion artifact (worse in the sagittal T1). No acute infarct. Redemonstrated prominent ventricular system out of proportion to the degree of peripheral atrophy suggesting normal pressure hydrocephalus (NPH). Correlate with clinical assessment. Cannot further evaluate the brain due to incomplete and limited exam.    8/1 CT head wo contrast: No acute intracranial abnormality is seen. Prominence of the ventricles out of proportion for volume loss raising suspicion for possible normal pressure hydrocephalus. Correlation with the patient's symptoms recommended.    8/1 CXR: No acute consolidation.  Cardiomegaly    Social/Education/Vocational Hx:  Pt lives alone    Swallow Information   Current Risks for Dysphagia & Aspiration: AMS     Current Symptoms/Concerns:  none reported    Current Diet: regular diet and thin liquids      Baseline Diet: regular diet and thin liquids      Baseline Assessment   Behavior/Cognition: alert    Speech/Language Status: able to participate in basic conversation and able to follow commands inconsistently- somewhat confused- [place] ( R HA in place upon arrival however he removed it stating it was beeping- this was placed into zip lock bag and patients belongings bag)    Patient Positioning: upright in bed    Swallow Mechanism Exam   Facial:  ?ptosis on L  Labial: WFL  Lingual: unable to test 2/2 limited command following  Velum: symmetrical  Mandible: adequate ROM  Dentition: limited dentition ( patients has dentures present in bag today however he reports he rarely wears them)  Vocal quality:clear/adequate   Volitional Cough: strong/productive   Resp: RA    Consistencies Assessed and Performance   Consistencies Administered: thin liquids, puree, soft solids, and hard solids  Specific materials administered included: pudding, sweet potato, spinach, pork and thin liquids.     Oral Stage:   Mastication was prolonged/min reduced but generally adequate with the materials administered today.  Bolus formation and transfer were min reduced/disorganized but functional with no significant oral residue noted. Cannot r/o premature spill.    Pharyngeal Stage:   Swallowing initiation appeared prompt-mildly delayed.  Laryngeal rise was palpated and judged to be within functional limits. No coughing, throat clearing, change in vocal quality or respiratory status noted today.     Esophageal Concerns: none reported    Summary   s/s suggestive of minimal oral dysphagia suspect related to dementia. No sxs of aspiration noted today- risk appears low. He was noted to favor use of R arm only with somewhat  ataxic UE movements during po intake today. Mild risk for reduced malnutrition suspected due to cognition.    Recommendations: regular diet and thin liquids     Recommended Form of Meds:  as tolerated      Aspiration precautions and compensatory swallowing strategies: upright posture, only feed when fully alert, slow rate of feeding, small bites/sips, and alternating bites and sips    Results Reviewed with: patient, RN, PT, OT, and Neurology service     Dysphagia Goals: pt will tolerate regular textures with thin liquids without s/s of aspiration x3    Plan  Will f/u 1-2x    Tawana Montalvo M.S., CCC-SLP  Speech Language Pathologist   Available via [employer]  [place] #[id number]  [place] #[id number]

## 2024-08-03 NOTE — ASSESSMENT & PLAN NOTE
The patient did have some fever on 8/2 at night.  Temperature was as high as 102  Moderate in the left hand arthritis the patient does not have any other localizing signs of infection although he did have an ultrasound that was done which did reveal some cholelithiasis  with some potential gallbladder wall thickening, the patient denies any right upper quadrant pain however.  UA relatively benign and has no urinary symptoms      Will order a HIDA scan check blood cultures  Check blood cultures  For arthritis pending serologies, Lyme

## 2024-08-03 NOTE — ASSESSMENT & PLAN NOTE
CT head: No acute intracranial abnormality is seen.  Prominence of the ventricles out of proportion for volume loss raising suspicion for possible normal pressure hydrocephalus. Correlation with the patient's symptoms recommended.  MRI with poor quality but also potential ventricular dilation

## 2024-08-03 NOTE — ASSESSMENT & PLAN NOTE
Wt Readings from Last 3 Encounters:   08/03/24 115 kg (252 lb 13.9 oz)   08/21/17 117 kg (257 lb 4 oz)   04/21/17 112 kg (247 lb 2 oz)     Patient appears to have slightly worsening edema on admission.  Suspicion for acute on chronic heart failure with preserved ejection fraction  Continue IV Lasix for now, consider changing to p.o. tomorrow if better

## 2024-08-03 NOTE — PLAN OF CARE
Recommendations: regular diet and thin liquids     Recommended Form of Meds: as tolerated     Aspiration precautions and compensatory swallowing strategies: upright posture, only feed when fully alert, slow rate of feeding, small bites/sips, and alternating bites and sips

## 2024-08-03 NOTE — PLAN OF CARE
Problem: PHYSICAL THERAPY ADULT  Goal: Performs mobility at highest level of function for planned discharge setting.  See evaluation for individualized goals.  Description: Treatment/Interventions: Functional transfer training, LE strengthening/ROM, Therapeutic exercise, Endurance training, Cognitive reorientation, Patient/family training, Bed mobility, Continued evaluation, Spoke to nursing, OT, Family          See flowsheet documentation for full assessment, interventions and recommendations.  Outcome: Progressing  Note: Prognosis: Good  Problem List: Decreased strength, Decreased endurance, Impaired balance, Decreased mobility, Pain, Decreased cognition, Decreased coordination, Impaired hearing  Assessment: Pt seen for PT treatment session this date with interventions consisting of Therapeutic exercise consisting of: AAROM 10 reps B LE in sitting position and therapeutic activity consisting of training: bed mobility, supine<>sit transfers, and sit<>stand transfers; attempted STS transfers x 2 attempts with pt. Unable to clear buttocks off of bed at all. Good participation with exercises however required consistent cueing to complete.  Requires tactile cues, increased time and repetition for follow through for all activities including exercises, transitions. Pt. With some coordination and motor planning deficits noted as well throughout session. Pt agreeable to PT treatment session upon arrival, pt found supine in bed w/ HOB elevated, in no apparent distress. In comparison to previous session, pt with no improvements as evidenced by similar performance to last session, did participate with seated TE . Post session: pt returned BTB. Continue to recommend Maximum Resource Intensity at time of d/c in order to maximize pt's functional independence and safety w/ mobility. Pt continues to be functioning below baseline level, and remains limited 2* factors listed above and including strength, balance, mobility  deficits, coordination deficits, decreased activity tolerance. PT will continue to see pt during current hospitalization in order to address the deficits listed above and provide interventions consistent w/ POC in effort to achieve STGs.  Barriers to Discharge: Decreased caregiver support, Inaccessible home environment     Rehab Resource Intensity Level, PT: I (Maximum Resource Intensity)    See flowsheet documentation for full assessment.

## 2024-08-03 NOTE — PLAN OF CARE
Problem: OCCUPATIONAL THERAPY ADULT  Goal: Performs self-care activities at highest level of function for planned discharge setting.  See evaluation for individualized goals.  Description: Treatment Interventions: ADL retraining, UE strengthening/ROM, Endurance training, Patient/family training          See flowsheet documentation for full assessment, interventions and recommendations.   Outcome: Progressing  Note: Limitation: Decreased ADL status, Decreased UE ROM, Decreased UE strength, Decreased cognition, Decreased endurance     Assessment: Patient participated in Skilled OT session this date with interventions consisting of ADL re training with the use of correct body mechnaics, safety awareness and fall prevention techniques, therapeutic exercise to: increase functional use of BUEs, increase BUE muscle strength ,  therapeutic activities to: increase activity tolerance, and increase OOB/ sitting tolerance . Patient agreeable to OT treatment session, upon arrival patient was found supine in bed. Patient performed bed mobility with increased time and cues with max A x 2 people, patient sat edge of bed to complete AROM exercises and grooming tasks, frequent cues to maintain upright posture while seated and ot attend to tasks.   In comparison to previous session, patient with improvements in sitting tolerance. Patient requiring frequent re direction, verbal cues for safety, cognitive assistance to anticipate next step, one step directives, and frequent rest periods. Patient continues to be functioning below baseline level, occupational performance remains limited secondary to factors listed above and increased risk for falls and injury.   From OT standpoint, recommendation at time of d/c would be level I, maximum resource intensity. Patient to benefit from continued Occupational Therapy treatment while in the hospital to address deficits as defined above and maximize level of functional independence with ADLs and  functional mobility.     Rehab Resource Intensity Level, OT: I (Maximum Resource Intensity)

## 2024-08-03 NOTE — PROGRESS NOTES
[facility]  Progress Note  Name: Antonio Duncan I  MRN: [id number]  Unit/Bed#: -01 I Date of Admission: 8/1/2024   Date of Service: 8/3/2024 I Hospital Day: 1    Assessment & Plan   Fever  Assessment & Plan  The patient did have some fever on 8/2 at night.  Temperature was as high as 102  Moderate in the left hand arthritis the patient does not have any other localizing signs of infection although he did have an ultrasound that was done which did reveal some cholelithiasis  with some potential gallbladder wall thickening, the patient denies any right upper quadrant pain however.  UA relatively benign and has no urinary symptoms      Will order a HIDA scan check blood cultures  Check blood cultures  For arthritis pending serologies, Lyme  Also will check Anaplasma Ehrlichia and Babesia blood smear in the morning  Start cefazolin and Flagyl for gallbladder coverage meantime      * Generalized weakness  Assessment & Plan  PT/OT evaluation   May benefit from rehab    Altered mental status  Assessment & Plan  Documented history of some confusion at home as per family.  The patient could have had acute metabolic encephalopathy given his poor cognitive reserve and now with fever and arthritis.  Neurological exam nonfocal despite CT and MRI findings.  Monitor    Pain and swelling of left upper extremity  Assessment & Plan  X-ray left upper extremities with no acute findings  Inflammatory markers elevated  Patient does have a history of arthritis, unclear type, at some point someone question rheumatoid  Follow RF and antibodies  Continue local Voltaren gel can consider steroid course as well    Radiographic suspicion normal pressure hydrocephalus on CT head  Assessment & Plan  CT head: No acute intracranial abnormality is seen.  Prominence of the ventricles out of proportion for volume loss raising suspicion for possible normal pressure hydrocephalus. Correlation with the patient's symptoms  recommended.  MRI with poor quality but also potential ventricular dilation       Elevated liver enzymes  Assessment & Plan  Total bilirubin remains elevated but alk phos normal, no right upper quadrant pain.  Monitor    Atrial fibrillation (HCC)  Assessment & Plan  Rate controlled  Continue Cardizem to 40 mg daily  Patient is on warfarin 2.5 mg daily  Daily INR was arranged 2-3    CHF (congestive heart failure) (HCC)  Assessment & Plan  Wt Readings from Last 3 Encounters:   08/03/24 115 kg (252 lb 13.9 oz)   08/21/17 117 kg (257 lb 4 oz)   04/21/17 112 kg (247 lb 2 oz)     Patient appears to have slightly worsening edema on admission.  Suspicion for acute on chronic heart failure with preserved ejection fraction  Continue IV Lasix for now, consider changing to p.o. tomorrow if better      Chronic acquired lymphedema  Assessment & Plan  Continue diuretics  Monitor renal function         VTE Pharmacologic Prophylaxis:   Pharmacologic: Warfarin (Coumadin)  Mechanical VTE Prophylaxis in Place: Yes    Patient Centered Rounds: I have performed bedside rounds with nursing staff today.    Discussions with Specialists or Other Care Team Provider: Discussed with care management team    Education and Discussions with Family / Patient: Discussed with family at the bedside    Time Spent for Care: 45 minutes.  More than 50% of total time spent on counseling and coordination of care as described above.    Current Length of Stay: 1 day(s)    Current Patient Status: Inpatient   Certification Statement: The patient will continue to require additional inpatient hospital stay due to need for IV therapy, monitoring of temperature    Discharge Plan: 48-72h    Code Status: Level 1 - Full Code      Subjective:     Patient valuated this morning.  He did have a fever of 102 yesterday at night.  Does mention that pain in the left wrist and hand is improving.  Appears to be AOx4.  Denies any nausea or vomiting.    Objective:     Vitals:    Temp (24hrs), Av.7 °F (37.6 °C), Min:97.8 °F (36.6 °C), Max:102 °F (38.9 °C)    Temp:  [97.8 °F (36.6 °C)-102 °F (38.9 °C)] 97.8 °F (36.6 °C)  HR:  [] 84  Resp:  [17] 17  BP: (122-134)/(62-84) 126/62  SpO2:  [90 %-97 %] 97 %  Body mass index is 36.28 kg/m².     Input and Output Summary (last 24 hours):       Intake/Output Summary (Last 24 hours) at 8/3/2024 1033  Last data filed at 8/3/2024 0103  Gross per 24 hour   Intake 900 ml   Output 100 ml   Net 800 ml       Physical Exam:     Physical Exam  Vitals and nursing note reviewed.   Constitutional:       Appearance: Normal appearance.      Comments: Male patient in bed, awake   HENT:      Head: Normocephalic and atraumatic.      Right Ear: External ear normal.      Left Ear: External ear normal.      Nose: Nose normal. No congestion or rhinorrhea.      Mouth/Throat:      Mouth: Mucous membranes are moist.      Pharynx: Oropharynx is clear. No oropharyngeal exudate or posterior oropharyngeal erythema.   Eyes:      General: No scleral icterus.        Right eye: No discharge.         Left eye: No discharge.      Pupils: Pupils are equal, round, and reactive to light.   Neck:      Vascular: No carotid bruit.   Cardiovascular:      Rate and Rhythm: Normal rate and regular rhythm.      Pulses: Normal pulses.      Heart sounds: No murmur heard.     No friction rub. No gallop.   Pulmonary:      Effort: Pulmonary effort is normal. No respiratory distress.      Breath sounds: Normal breath sounds. No stridor. No wheezing, rhonchi or rales.   Abdominal:      General: Abdomen is flat. Bowel sounds are normal. There is no distension.      Palpations: Abdomen is soft. There is no mass.      Tenderness: There is no abdominal tenderness. There is no guarding or rebound.      Hernia: No hernia is present.   Musculoskeletal:         General: No swelling, tenderness, deformity or signs of injury. Normal range of motion.      Cervical back: Normal range of motion. No  rigidity. No muscular tenderness.      Comments: There is erythema and swelling in the first and second left metacarpophalangeal joints.  Wrist is also swollen and that hand   Lymphadenopathy:      Cervical: No cervical adenopathy.   Skin:     General: Skin is warm and dry.      Capillary Refill: Capillary refill takes less than 2 seconds.      Coloration: Skin is not jaundiced or pale.      Findings: No bruising or erythema.   Neurological:      General: No focal deficit present.      Mental Status: He is alert and oriented to person, place, and time. Mental status is at baseline.      Cranial Nerves: No cranial nerve deficit.      Sensory: No sensory deficit.      Motor: No weakness.      Coordination: Coordination normal.      Deep Tendon Reflexes: Reflexes normal.   Psychiatric:         Mood and Affect: Mood normal.         Behavior: Behavior normal.         Thought Content: Thought content normal.         Judgment: Judgment normal.           Additional Data:     Labs:    Results from last 7 days   Lab Units 08/03/24  0904   WBC Thousand/uL 5.97   HEMOGLOBIN g/dL 11.5*   HEMATOCRIT % 35.6*   PLATELETS Thousands/uL 117*   SEGS PCT % 76*   LYMPHO PCT % 12*   MONO PCT % 12   EOS PCT % 0     Results from last 7 days   Lab Units 08/03/24  0904   SODIUM mmol/L 136   POTASSIUM mmol/L 3.8   CHLORIDE mmol/L 101   CO2 mmol/L 27   BUN mg/dL 31*   CREATININE mg/dL 1.18   ANION GAP mmol/L 8   CALCIUM mg/dL 9.3   ALBUMIN g/dL 3.8   TOTAL BILIRUBIN mg/dL 2.64*   ALK PHOS U/L 79   ALT U/L 16   AST U/L 26   GLUCOSE RANDOM mg/dL 131     Results from last 7 days   Lab Units 08/03/24  0904   INR  2.55*             Results from last 7 days   Lab Units 08/02/24  0348   PROCALCITONIN ng/ml 1.08*           * I Have Reviewed All Lab Data Listed Above.  * Additional Pertinent Lab Tests Reviewed: All Labs For Current Hospital Admission Reviewed        Recent Cultures (last 7 days):           Last 24 Hours Medication List:   Current  Facility-Administered Medications   Medication Dose Route Frequency Provider Last Rate    acetaminophen  650 mg Oral Q6H PRN Kim Zaldivar MD      atorvastatin  20 mg Oral Daily Kim Zaldivar MD      cefazolin  2,000 mg Intravenous Q8H Bienvenido Torres MD 2,000 mg (08/03/24 1008)    colchicine  0.6 mg Oral Daily Bienvenido Torres MD      Diclofenac Sodium  2 g Topical 4x Daily Bienvenido Torres MD      diltiazem  240 mg Oral Daily Kim Zaldivar MD      furosemide  40 mg Intravenous Daily Kim Zaldivar MD      metroNIDAZOLE  500 mg Intravenous Q8H Bienvenido Torres MD      tamsulosin  0.4 mg Oral Daily With Dinner Kim Zaldivar MD      warfarin  2.5 mg Oral Daily (warfarin) Kim Zaldivar MD          Today, Patient Was Seen By: Bienvenido Torres MD    ** Please Note: Dictation voice to text software may have been used in the creation of this document. **

## 2024-08-03 NOTE — OCCUPATIONAL THERAPY NOTE
Occupational Therapy         Patient Name: Antonio Duncan  Today's Date: 8/3/2024       08/03/24 1103   OT Last Visit   OT Visit Date 08/03/24   Note Type   Note Type Treatment   Pain Assessment   Pain Assessment Tool Mcpherson-Baker FACES   Pain Score 8   Mcpherson-Baker FACES Pain Rating 8   Pain Location/Orientation Location: Generalized;Orientation: Left;Location: Arm   Pain Onset/Description Onset: Ongoing   Hospital Pain Intervention(s) Medication (See MAR);Repositioned   Restrictions/Precautions   Weight Bearing Precautions Per Order No   Braces or Orthoses Other (Comment)  (None)   Other Precautions Bed Alarm;Fall Risk;Cognitive;Hard of hearing   ADL   Where Assessed Edge of bed   Equipment Provided Other (Comment)  (none)   Eating Assistance 3  Moderate Assistance   Eating Deficit Setup;Impulsive;Verbal cueing;Supervision/safety   Grooming Assistance 3  Moderate Assistance   Grooming Deficit Verbal cueing;Setup;Supervision/safety;Increased time to complete   UB Dressing Assistance 2  Maximal Assistance   UB Dressing Deficit Verbal cueing;Supervision/safety;Increased time to complete   Toileting Assistance  2  Maximal Assistance   Toileting Deficit Use of bedpan/urinal setup;Impulsive;Supervison/safety   Bed Mobility   Rolling R 2  Maximal assistance   Additional items Assist x 2;Bedrails;Increased time required   Rolling L 2  Maximal assistance   Additional items Assist x 2;Bedrails;Increased time required   Supine to Sit 2  Maximal assistance   Additional items Assist x 2;Increased time required;Verbal cues   Sit to Supine 2  Maximal assistance   Additional items Assist x 2;Bedrails;Increased time required   Transfers   Sit to Stand 1  Dependent   Additional items Assist x 2;Increased time required;Verbal cues  (Attempted sit to stand x 3 trials, patient was unable to raise off of bed.)   Functional Mobility   Functional Mobility   (Unable)   Therapeutic Exercise - ROM   UE-ROM Yes   ROM- Right Upper  Extremities   R Shoulder AROM   R Elbow AAROM   R Wrist AROM   R Hand AROM   R Position Seated   Equipment Other (Comment)  (AROM)   R Weight/Reps/Sets 10 reps x 1 set   ROM - Left Upper Extremities    L Shoulder AROM   L Elbow AROM   L Wrist AROM   L Hand AROM   L Position Seated   Equipment Other (Comment)  (None)   L Weight/Reps/Sets 10 reps x 1 set   LUE ROM Comment Decreased swelling as reported by patient.   Subjective   Subjective Pt agreeable to session.   Cognition   Overall Cognitive Status Impaired   Arousal/Participation Alert;Cooperative   Attention Attends with cues to redirect   Orientation Level Oriented to person;Oriented to place;Oriented to time   Memory Decreased recall of recent events;Decreased short term memory   Following Commands Follows one step commands with increased time or repetition   Activity Tolerance   Activity Tolerance Patient limited by fatigue;Patient limited by pain   Medical Staff Made Aware Pt seen as a co-treatment with PT due to the patient's co-morbidities, clinically unstable presentation, and present impairments which are a regression from the patient's baseline.   Assessment   Assessment Patient participated in Skilled OT session this date with interventions consisting of ADL re training with the use of correct body mechnaics, safety awareness and fall prevention techniques, therapeutic exercise to: increase functional use of BUEs, increase BUE muscle strength ,  therapeutic activities to: increase activity tolerance, and increase OOB/ sitting tolerance . Patient agreeable to OT treatment session, upon arrival patient was found supine in bed. Patient performed bed mobility with increased time and cues with max A x 2 people, patient sat edge of bed to complete AROM exercises and grooming tasks, frequent cues to maintain upright posture while seated and ot attend to tasks.   In comparison to previous session, patient with improvements in sitting tolerance. Patient  requiring frequent re direction, verbal cues for safety, cognitive assistance to anticipate next step, one step directives, and frequent rest periods. Patient continues to be functioning below baseline level, occupational performance remains limited secondary to factors listed above and increased risk for falls and injury.   From OT standpoint, recommendation at time of d/c would be level I, maximum resource intensity. Patient to benefit from continued Occupational Therapy treatment while in the hospital to address deficits as defined above and maximize level of functional independence with ADLs and functional mobility.   Plan   Treatment Interventions ADL retraining;UE strengthening/ROM;Endurance training;Patient/family training   Goal Expiration Date 08/16/24   OT Frequency 3-5x/wk   Discharge Recommendation   Rehab Resource Intensity Level, OT I (Maximum Resource Intensity)   AM-PAC Daily Activity Inpatient   Lower Body Dressing 1   Bathing 2   Toileting 1   Upper Body Dressing 2   Grooming 2   Eating 3   Daily Activity Raw Score 11   Daily Activity Standardized Score (Calc for Raw Score >=11) 29.04   AM-PAC Applied Cognition Inpatient   Following a Speech/Presentation 2   Understanding Ordinary Conversation 3   Taking Medications 2   Remembering Where Things Are Placed or Put Away 1   Remembering List of 4-5 Errands 1   Taking Care of Complicated Tasks 1   Applied Cognition Raw Score 10   Applied Cognition Standardized Score 24.98

## 2024-08-04 LAB
ANION GAP SERPL CALCULATED.3IONS-SCNC: 8 MMOL/L (ref 4–13)
BASOPHILS # BLD AUTO: 0.02 THOUSANDS/ÂΜL (ref 0–0.1)
BASOPHILS NFR BLD AUTO: 0 % (ref 0–1)
BUN SERPL-MCNC: 31 MG/DL (ref 5–25)
CALCIUM SERPL-MCNC: 8.7 MG/DL (ref 8.4–10.2)
CHLORIDE SERPL-SCNC: 103 MMOL/L (ref 96–108)
CO2 SERPL-SCNC: 25 MMOL/L (ref 21–32)
CREAT SERPL-MCNC: 1.08 MG/DL (ref 0.6–1.3)
CRYOGLOB RF SER-ACNC: ABNORMAL [IU]/ML
EOSINOPHIL # BLD AUTO: 0.04 THOUSAND/ÂΜL (ref 0–0.61)
EOSINOPHIL NFR BLD AUTO: 1 % (ref 0–6)
ERYTHROCYTE [DISTWIDTH] IN BLOOD BY AUTOMATED COUNT: 15.2 % (ref 11.6–15.1)
GFR SERPL CREATININE-BSD FRML MDRD: 62 ML/MIN/1.73SQ M
GLUCOSE SERPL-MCNC: 110 MG/DL (ref 65–140)
HCT VFR BLD AUTO: 33.8 % (ref 36.5–49.3)
HGB BLD-MCNC: 10.8 G/DL (ref 12–17)
IMM GRANULOCYTES # BLD AUTO: 0.02 THOUSAND/UL (ref 0–0.2)
IMM GRANULOCYTES NFR BLD AUTO: 0 % (ref 0–2)
INR PPP: 2.6 (ref 0.85–1.19)
LYMPHOCYTES # BLD AUTO: 0.96 THOUSANDS/ÂΜL (ref 0.6–4.47)
LYMPHOCYTES NFR BLD AUTO: 17 % (ref 14–44)
MCH RBC QN AUTO: 30.4 PG (ref 26.8–34.3)
MCHC RBC AUTO-ENTMCNC: 32 G/DL (ref 31.4–37.4)
MCV RBC AUTO: 95 FL (ref 82–98)
MONOCYTES # BLD AUTO: 0.76 THOUSAND/ÂΜL (ref 0.17–1.22)
MONOCYTES NFR BLD AUTO: 14 % (ref 4–12)
NEUTROPHILS # BLD AUTO: 3.72 THOUSANDS/ÂΜL (ref 1.85–7.62)
NEUTS SEG NFR BLD AUTO: 68 % (ref 43–75)
NRBC BLD AUTO-RTO: 0 /100 WBCS
PLATELET # BLD AUTO: 118 THOUSANDS/UL (ref 149–390)
PMV BLD AUTO: 9.5 FL (ref 8.9–12.7)
POTASSIUM SERPL-SCNC: 3.7 MMOL/L (ref 3.5–5.3)
PROTHROMBIN TIME: 28.5 SECONDS (ref 12.3–15)
RBC # BLD AUTO: 3.55 MILLION/UL (ref 3.88–5.62)
RHEUMATOID FACT SER QL LA: POSITIVE
SODIUM SERPL-SCNC: 136 MMOL/L (ref 135–147)
WBC # BLD AUTO: 5.52 THOUSAND/UL (ref 4.31–10.16)

## 2024-08-04 PROCEDURE — 87207 SMEAR SPECIAL STAIN: CPT | Performed by: INTERNAL MEDICINE

## 2024-08-04 PROCEDURE — 86666 EHRLICHIA ANTIBODY: CPT | Performed by: INTERNAL MEDICINE

## 2024-08-04 PROCEDURE — 85610 PROTHROMBIN TIME: CPT | Performed by: INTERNAL MEDICINE

## 2024-08-04 PROCEDURE — 85025 COMPLETE CBC W/AUTO DIFF WBC: CPT | Performed by: INTERNAL MEDICINE

## 2024-08-04 PROCEDURE — 80048 BASIC METABOLIC PNL TOTAL CA: CPT | Performed by: INTERNAL MEDICINE

## 2024-08-04 PROCEDURE — 99233 SBSQ HOSP IP/OBS HIGH 50: CPT | Performed by: INTERNAL MEDICINE

## 2024-08-04 PROCEDURE — 86753 PROTOZOA ANTIBODY NOS: CPT | Performed by: INTERNAL MEDICINE

## 2024-08-04 PROCEDURE — 87468 ANAPLSMA PHGCYTOPHLM AMP PRB: CPT | Performed by: INTERNAL MEDICINE

## 2024-08-04 RX ORDER — LANOLIN ALCOHOL/MO/W.PET/CERES
3 CREAM (GRAM) TOPICAL
Status: DISCONTINUED | OUTPATIENT
Start: 2024-08-04 | End: 2024-08-06 | Stop reason: HOSPADM

## 2024-08-04 RX ADMIN — METRONIDAZOLE 500 MG: 500 INJECTION, SOLUTION INTRAVENOUS at 09:23

## 2024-08-04 RX ADMIN — METRONIDAZOLE 500 MG: 500 INJECTION, SOLUTION INTRAVENOUS at 01:32

## 2024-08-04 RX ADMIN — COLCHICINE 0.6 MG: 0.6 TABLET ORAL at 09:26

## 2024-08-04 RX ADMIN — FUROSEMIDE 40 MG: 10 INJECTION, SOLUTION INTRAMUSCULAR; INTRAVENOUS at 09:26

## 2024-08-04 RX ADMIN — CEFAZOLIN SODIUM 2000 MG: 2 SOLUTION INTRAVENOUS at 17:05

## 2024-08-04 RX ADMIN — CEFAZOLIN SODIUM 2000 MG: 2 SOLUTION INTRAVENOUS at 01:27

## 2024-08-04 RX ADMIN — DILTIAZEM HYDROCHLORIDE 240 MG: 240 CAPSULE, COATED, EXTENDED RELEASE ORAL at 09:26

## 2024-08-04 RX ADMIN — METRONIDAZOLE 500 MG: 500 INJECTION, SOLUTION INTRAVENOUS at 17:06

## 2024-08-04 RX ADMIN — ATORVASTATIN CALCIUM 20 MG: 20 TABLET, FILM COATED ORAL at 09:28

## 2024-08-04 RX ADMIN — WARFARIN SODIUM 2.5 MG: 2.5 TABLET ORAL at 17:17

## 2024-08-04 RX ADMIN — TAMSULOSIN HYDROCHLORIDE 0.4 MG: 0.4 CAPSULE ORAL at 17:17

## 2024-08-04 RX ADMIN — CEFAZOLIN SODIUM 2000 MG: 2 SOLUTION INTRAVENOUS at 09:22

## 2024-08-04 NOTE — ASSESSMENT & PLAN NOTE
Wt Readings from Last 3 Encounters:   08/04/24 117 kg (257 lb 4.4 oz)   08/21/17 117 kg (257 lb 4 oz)   04/21/17 112 kg (247 lb 2 oz)     Patient appears to have slightly worsening edema on admission.  Suspicion for acute on chronic heart failure with preserved ejection fraction  I will continue 1 more day of IV Lasix.

## 2024-08-04 NOTE — PROGRESS NOTES
[facility]  Progress Note  Name: Antonio Duncan I  MRN: [id number]  Unit/Bed#: -01 I Date of Admission: 8/1/2024   Date of Service: 8/4/2024 I Hospital Day: 2    Assessment & Plan   Fever  Assessment & Plan  The patient did have some fever on 8/2 at night.  Temperature was as high as 102  Moderate in the left hand arthritis the patient does not have any other localizing signs of infection although he did have an ultrasound that was done which did reveal some cholelithiasis  with some potential gallbladder wall thickening, the patient denies any right upper quadrant pain however.  UA relatively benign and has no urinary symptoms      Pending HIDA scan to assess gallbladder  Continue cefazolin and Flagyl for now  Monitor clinically      * Generalized weakness  Assessment & Plan  PT OT recommends rehab with family and patient agrees    Altered mental status  Assessment & Plan  Documented history of some confusion at home as per family.  The patient could have had acute metabolic encephalopathy given his poor cognitive reserve and now with fever and arthritis.  Neurological exam nonfocal despite CT and MRI findings.  Monitor    Pain and swelling of left upper extremity  Assessment & Plan  X-ray left upper extremities with no acute findings  Inflammatory markers elevated  Patient does have a history of arthritis, unclear type, at some point someone question rheumatoid -he does have a positive rheumatoid factor so suspect rheumatoid arthritis.    Continue Voltaren gel for now as it seems to be working well. If worsening can consider a course of systemic steroids.    Radiographic suspicion normal pressure hydrocephalus on CT head  Assessment & Plan  CT head: No acute intracranial abnormality is seen.  Prominence of the ventricles out of proportion for volume loss raising suspicion for possible normal pressure hydrocephalus. Correlation with the patient's symptoms recommended.  MRI with poor  quality but also potential ventricular dilation -patient would benefit from outpatient follow-up in the [facility]       Elevated liver enzymes  Assessment & Plan  Total bilirubin remains elevated but alk phos normal, no right upper quadrant pain.  Monitor    Atrial fibrillation (HCC)  Assessment & Plan  Rate controlled  Continue Cardizem to 40 mg daily  Patient is on warfarin 2.5 mg daily  Daily INR was arranged 2-3    CHF (congestive heart failure) (HCC)  Assessment & Plan  Wt Readings from Last 3 Encounters:   24 117 kg (257 lb 4.4 oz)   17 117 kg (257 lb 4 oz)   17 112 kg (247 lb 2 oz)     Patient appears to have slightly worsening edema on admission.  Suspicion for acute on chronic heart failure with preserved ejection fraction  I will continue 1 more day of IV Lasix.      Chronic acquired lymphedema  Assessment & Plan  Continue diuretics  Monitor renal function           VTE Pharmacologic Prophylaxis:   Pharmacologic: Warfarin (Coumadin)  Mechanical VTE Prophylaxis in Place: Yes    Patient Centered Rounds: I have performed bedside rounds with nursing staff today.    Discussions with Specialists or Other Care Team Provider: Discussed with care management team    Education and Discussions with Family / Patient: Discussed with patient and family    Time Spent for Care: 45 minutes.  More than 50% of total time spent on counseling and coordination of care as described above.    Current Length of Stay: 2 day(s)    Current Patient Status: Inpatient   Certification Statement: The patient will continue to require additional inpatient hospital stay due to need for IV therapy    Discharge Plan: 48-72h    Code Status: Level 1 - Full Code      Subjective:     Patient evaluated this morning  Feels well  Joint pain improving in L hand  No RUQ pain    Objective:     Vitals:   Temp (24hrs), Av.6 °F (37 °C), Min:97.9 °F (36.6 °C), Max:99 °F (37.2 °C)    Temp:  [97.9 °F (36.6 °C)-99 °F (37.2 °C)] 97.9 °F  (36.6 °C)  HR:  [] 102  Resp:  [16-18] 18  BP: (116-133)/(67-79) 133/79  SpO2:  [93 %-95 %] 93 %  Body mass index is 36.92 kg/m².     Input and Output Summary (last 24 hours):       Intake/Output Summary (Last 24 hours) at 8/4/2024 1221  Last data filed at 8/4/2024 0923  Gross per 24 hour   Intake 600 ml   Output 800 ml   Net -200 ml       Physical Exam:     Physical Exam  Vitals and nursing note reviewed.   Constitutional:       Appearance: Normal appearance.      Comments: Male patient in bed, awake   HENT:      Head: Normocephalic and atraumatic.      Right Ear: External ear normal.      Left Ear: External ear normal.      Nose: Nose normal. No congestion or rhinorrhea.      Mouth/Throat:      Mouth: Mucous membranes are moist.      Pharynx: Oropharynx is clear. No oropharyngeal exudate or posterior oropharyngeal erythema.   Eyes:      General: No scleral icterus.        Right eye: No discharge.         Left eye: No discharge.      Pupils: Pupils are equal, round, and reactive to light.   Neck:      Vascular: No carotid bruit.   Cardiovascular:      Rate and Rhythm: Normal rate and regular rhythm.      Pulses: Normal pulses.      Heart sounds: No murmur heard.     No friction rub. No gallop.   Pulmonary:      Effort: Pulmonary effort is normal. No respiratory distress.      Breath sounds: Normal breath sounds. No stridor. No wheezing, rhonchi or rales.   Abdominal:      General: Abdomen is flat. Bowel sounds are normal. There is no distension.      Palpations: Abdomen is soft. There is no mass.      Tenderness: There is no abdominal tenderness. There is no guarding or rebound.      Hernia: No hernia is present.   Musculoskeletal:         General: No swelling, tenderness, deformity or signs of injury. Normal range of motion.      Cervical back: Normal range of motion. No rigidity. No muscular tenderness.      Comments: Swelling in L hand and wrist decreasing   Lymphadenopathy:      Cervical: No cervical  adenopathy.   Skin:     General: Skin is warm and dry.      Capillary Refill: Capillary refill takes less than 2 seconds.      Coloration: Skin is not jaundiced or pale.      Findings: No bruising or erythema.   Neurological:      General: No focal deficit present.      Mental Status: He is alert and oriented to person, place, and time. Mental status is at baseline.      Cranial Nerves: No cranial nerve deficit.      Sensory: No sensory deficit.      Motor: No weakness.      Coordination: Coordination normal.      Deep Tendon Reflexes: Reflexes normal.   Psychiatric:         Mood and Affect: Mood normal.         Behavior: Behavior normal.         Thought Content: Thought content normal.         Judgment: Judgment normal.         Additional Data:     Labs:    Results from last 7 days   Lab Units 08/04/24  0545   WBC Thousand/uL 5.52   HEMOGLOBIN g/dL 10.8*   HEMATOCRIT % 33.8*   PLATELETS Thousands/uL 118*   SEGS PCT % 68   LYMPHO PCT % 17   MONO PCT % 14*   EOS PCT % 1     Results from last 7 days   Lab Units 08/04/24  0545 08/03/24  0904   SODIUM mmol/L 136 136   POTASSIUM mmol/L 3.7 3.8   CHLORIDE mmol/L 103 101   CO2 mmol/L 25 27   BUN mg/dL 31* 31*   CREATININE mg/dL 1.08 1.18   ANION GAP mmol/L 8 8   CALCIUM mg/dL 8.7 9.3   ALBUMIN g/dL  --  3.8   TOTAL BILIRUBIN mg/dL  --  2.64*   ALK PHOS U/L  --  79   ALT U/L  --  16   AST U/L  --  26   GLUCOSE RANDOM mg/dL 110 131     Results from last 7 days   Lab Units 08/04/24  0545   INR  2.60*             Results from last 7 days   Lab Units 08/02/24  0348   PROCALCITONIN ng/ml 1.08*           * I Have Reviewed All Lab Data Listed Above.  * Additional Pertinent Lab Tests Reviewed: All Labs For Current Hospital Admission Reviewed      Recent Cultures (last 7 days):     Results from last 7 days   Lab Units 08/03/24  0912   BLOOD CULTURE  Received in Microbiology Lab. Culture in Progress.  Received in Microbiology Lab. Culture in Progress.       Last 24 Hours Medication  List:   Current Facility-Administered Medications   Medication Dose Route Frequency Provider Last Rate    acetaminophen  650 mg Oral Q6H PRN Kim Zaldivar MD      atorvastatin  20 mg Oral Daily Kim Zaldivar MD      cefazolin  2,000 mg Intravenous Q8H Bienvenido Torres MD 2,000 mg (08/04/24 0922)    colchicine  0.6 mg Oral Daily Bienvenido Torres MD      Diclofenac Sodium  2 g Topical 4x Daily Bienvenido Torres MD      diltiazem  240 mg Oral Daily Kim Zaldivar MD      furosemide  40 mg Intravenous Daily Kim Zaldivar MD      metroNIDAZOLE  500 mg Intravenous Q8H Bienvenido Torres  mg (08/04/24 0923)    tamsulosin  0.4 mg Oral Daily With Dinner Kim Zaldivar MD      warfarin  2.5 mg Oral Daily (warfarin) Kim Zaldivar MD          Today, Patient Was Seen By: Bienvenido Torres MD    ** Please Note: Dictation voice to text software may have been used in the creation of this document. **

## 2024-08-04 NOTE — ASSESSMENT & PLAN NOTE
CT head: No acute intracranial abnormality is seen.  Prominence of the ventricles out of proportion for volume loss raising suspicion for possible normal pressure hydrocephalus. Correlation with the patient's symptoms recommended.  MRI with poor quality but also potential ventricular dilation -patient would benefit from outpatient follow-up in the [facility]

## 2024-08-04 NOTE — ASSESSMENT & PLAN NOTE
The patient did have some fever on 8/2 at night.  Temperature was as high as 102  Moderate in the left hand arthritis the patient does not have any other localizing signs of infection although he did have an ultrasound that was done which did reveal some cholelithiasis  with some potential gallbladder wall thickening, the patient denies any right upper quadrant pain however.  UA relatively benign and has no urinary symptoms      Pending HIDA scan to assess gallbladder  Continue cefazolin and Flagyl for now  Monitor clinically

## 2024-08-05 ENCOUNTER — APPOINTMENT (INPATIENT)
Dept: RADIOLOGY | Facility: HOSPITAL | Age: 84
DRG: 291 | End: 2024-08-05
Payer: MEDICARE

## 2024-08-05 ENCOUNTER — APPOINTMENT (OUTPATIENT)
Dept: NUCLEAR MEDICINE | Facility: HOSPITAL | Age: 84
DRG: 291 | End: 2024-08-05
Payer: MEDICARE

## 2024-08-05 PROBLEM — R26.2 AMBULATORY DYSFUNCTION: Chronic | Status: ACTIVE | Noted: 2024-08-05

## 2024-08-05 LAB
ALBUMIN SERPL BCG-MCNC: 3.6 G/DL (ref 3.5–5)
ALP SERPL-CCNC: 85 U/L (ref 34–104)
ALT SERPL W P-5'-P-CCNC: 10 U/L (ref 7–52)
ANION GAP SERPL CALCULATED.3IONS-SCNC: 7 MMOL/L (ref 4–13)
AST SERPL W P-5'-P-CCNC: 27 U/L (ref 13–39)
BASOPHILS # BLD AUTO: 0.02 THOUSANDS/ÂΜL (ref 0–0.1)
BASOPHILS NFR BLD AUTO: 0 % (ref 0–1)
BILIRUB SERPL-MCNC: 1.07 MG/DL (ref 0.2–1)
BLD SMEAR FINDING POSITIVE INTERP: NO
BUN SERPL-MCNC: 26 MG/DL (ref 5–25)
CALCIUM SERPL-MCNC: 9.1 MG/DL (ref 8.4–10.2)
CHLORIDE SERPL-SCNC: 102 MMOL/L (ref 96–108)
CO2 SERPL-SCNC: 29 MMOL/L (ref 21–32)
CREAT SERPL-MCNC: 0.96 MG/DL (ref 0.6–1.3)
EOSINOPHIL # BLD AUTO: 0.09 THOUSAND/ÂΜL (ref 0–0.61)
EOSINOPHIL NFR BLD AUTO: 2 % (ref 0–6)
ERYTHROCYTE [DISTWIDTH] IN BLOOD BY AUTOMATED COUNT: 15.1 % (ref 11.6–15.1)
FLUAV RNA RESP QL NAA+PROBE: NEGATIVE
FLUBV RNA RESP QL NAA+PROBE: NEGATIVE
GFR SERPL CREATININE-BSD FRML MDRD: 72 ML/MIN/1.73SQ M
GLUCOSE SERPL-MCNC: 105 MG/DL (ref 65–140)
HCT VFR BLD AUTO: 35.9 % (ref 36.5–49.3)
HGB BLD-MCNC: 11.5 G/DL (ref 12–17)
IMM GRANULOCYTES # BLD AUTO: 0.03 THOUSAND/UL (ref 0–0.2)
IMM GRANULOCYTES NFR BLD AUTO: 1 % (ref 0–2)
INR PPP: 2.3 (ref 0.85–1.19)
LYMPHOCYTES # BLD AUTO: 0.91 THOUSANDS/ÂΜL (ref 0.6–4.47)
LYMPHOCYTES NFR BLD AUTO: 18 % (ref 14–44)
MCH RBC QN AUTO: 30.6 PG (ref 26.8–34.3)
MCHC RBC AUTO-ENTMCNC: 32 G/DL (ref 31.4–37.4)
MCV RBC AUTO: 96 FL (ref 82–98)
MONOCYTES # BLD AUTO: 0.63 THOUSAND/ÂΜL (ref 0.17–1.22)
MONOCYTES NFR BLD AUTO: 12 % (ref 4–12)
NEUTROPHILS # BLD AUTO: 3.46 THOUSANDS/ÂΜL (ref 1.85–7.62)
NEUTS SEG NFR BLD AUTO: 67 % (ref 43–75)
NRBC BLD AUTO-RTO: 0 /100 WBCS
PARASITE BLD: NO
PARASITE BLD: NO
PARASITE INFECTED RBC BLD-NFR: 0 %
PLATELET # BLD AUTO: 157 THOUSANDS/UL (ref 149–390)
PMV BLD AUTO: 9.3 FL (ref 8.9–12.7)
POTASSIUM SERPL-SCNC: 3.9 MMOL/L (ref 3.5–5.3)
PROT SERPL-MCNC: 7 G/DL (ref 6.4–8.4)
PROTHROMBIN TIME: 26 SECONDS (ref 12.3–15)
RBC # BLD AUTO: 3.76 MILLION/UL (ref 3.88–5.62)
RSV RNA RESP QL NAA+PROBE: NEGATIVE
SARS-COV-2 RNA RESP QL NAA+PROBE: NEGATIVE
SODIUM SERPL-SCNC: 138 MMOL/L (ref 135–147)
WBC # BLD AUTO: 5.14 THOUSAND/UL (ref 4.31–10.16)

## 2024-08-05 PROCEDURE — 85060 BLOOD SMEAR INTERPRETATION: CPT | Performed by: PATHOLOGY

## 2024-08-05 PROCEDURE — 78227 HEPATOBIL SYST IMAGE W/DRUG: CPT

## 2024-08-05 PROCEDURE — 80053 COMPREHEN METABOLIC PANEL: CPT | Performed by: INTERNAL MEDICINE

## 2024-08-05 PROCEDURE — 73630 X-RAY EXAM OF FOOT: CPT

## 2024-08-05 PROCEDURE — 85025 COMPLETE CBC W/AUTO DIFF WBC: CPT | Performed by: INTERNAL MEDICINE

## 2024-08-05 PROCEDURE — 85610 PROTHROMBIN TIME: CPT | Performed by: INTERNAL MEDICINE

## 2024-08-05 PROCEDURE — 0241U HB NFCT DS VIR RESP RNA 4 TRGT: CPT | Performed by: INTERNAL MEDICINE

## 2024-08-05 PROCEDURE — A9537 TC99M MEBROFENIN: HCPCS

## 2024-08-05 PROCEDURE — 99233 SBSQ HOSP IP/OBS HIGH 50: CPT | Performed by: INTERNAL MEDICINE

## 2024-08-05 PROCEDURE — 87207 SMEAR SPECIAL STAIN: CPT | Performed by: PATHOLOGY

## 2024-08-05 RX ORDER — PREDNISONE 20 MG/1
40 TABLET ORAL DAILY
Status: DISCONTINUED | OUTPATIENT
Start: 2024-08-05 | End: 2024-08-06 | Stop reason: HOSPADM

## 2024-08-05 RX ADMIN — ATORVASTATIN CALCIUM 20 MG: 20 TABLET, FILM COATED ORAL at 08:18

## 2024-08-05 RX ADMIN — Medication 2 G: at 18:12

## 2024-08-05 RX ADMIN — Medication 2 G: at 08:29

## 2024-08-05 RX ADMIN — CEFAZOLIN SODIUM 2000 MG: 2 SOLUTION INTRAVENOUS at 00:36

## 2024-08-05 RX ADMIN — Medication 2 G: at 22:09

## 2024-08-05 RX ADMIN — CEFAZOLIN SODIUM 2000 MG: 2 SOLUTION INTRAVENOUS at 08:18

## 2024-08-05 RX ADMIN — TAMSULOSIN HYDROCHLORIDE 0.4 MG: 0.4 CAPSULE ORAL at 18:11

## 2024-08-05 RX ADMIN — METRONIDAZOLE 500 MG: 500 INJECTION, SOLUTION INTRAVENOUS at 00:36

## 2024-08-05 RX ADMIN — FUROSEMIDE 40 MG: 10 INJECTION, SOLUTION INTRAMUSCULAR; INTRAVENOUS at 08:18

## 2024-08-05 RX ADMIN — WARFARIN SODIUM 2.5 MG: 2.5 TABLET ORAL at 18:11

## 2024-08-05 RX ADMIN — PREDNISONE 40 MG: 20 TABLET ORAL at 08:42

## 2024-08-05 RX ADMIN — DILTIAZEM HYDROCHLORIDE 240 MG: 240 CAPSULE, COATED, EXTENDED RELEASE ORAL at 08:18

## 2024-08-05 RX ADMIN — METRONIDAZOLE 500 MG: 500 INJECTION, SOLUTION INTRAVENOUS at 08:17

## 2024-08-05 RX ADMIN — COLCHICINE 0.6 MG: 0.6 TABLET ORAL at 08:16

## 2024-08-05 NOTE — QUICK NOTE
"This morning around 0500, RN and PCA approached pt to assist in cleaning episode of incontinence. RN and PCA informed pt he would need to turn to his right. Upon aiding pt to do so, pt became agitated and flailed L arm multiple times, swinging his L hand and hitting the railing with it multiple times. Pt rolled back to flat, then pt yelling at PCA stating \"You grabbed my fingers, you're trying to break my fingers!\"   Pt made reference again in a subsequent cleaning with RN, stating \"If you try to break my fingers again-\" and various threats. RN giving report this AM, family member stated that pt stated staff \"tried to break his fingers.\" RN informed family member of what occurred earlier in morning and is detailed at the start of this note where pt swung hand into rail; informed family member no one was hitting his hand.   "

## 2024-08-05 NOTE — PROGRESS NOTES
Patient:    MRN:  [id number]    Jose M Request ID:  [id number]    Level of care reserved:  Skilled Nursing Facility    Partner Reserved:  [facility], [place] [id number] [phone]    Clinical needs requested:    Geography searched:  45 miles around [id number]    Start of Service:    Request sent:  1:39pm EDT on 8/2/2024 by Kallie Lowry    Partner reserved:  9:15am EDT on 8/5/2024 by Kallie Lowry    Choice list shared:  8:54am EDT on 8/5/2024 by Kallie Lowry

## 2024-08-05 NOTE — PROGRESS NOTES
[facility]  Progress Note  Name: Antonio Duncan I  MRN: [id number]  Unit/Bed#: -01 I Date of Admission: 8/1/2024   Date of Service: 8/5/2024 I Hospital Day: 3    Assessment & Plan   Fever  Assessment & Plan  The patient did have some fever on 8/2 at night, temperature was as high as 102  Moderate in the left hand arthritis the patient does not have any other localizing signs of infection although he did have an ultrasound that was done which did reveal some cholelithiasis  with some potential gallbladder wall thickening, the patient denies any right upper quadrant pain however.  UA relatively benign and has no urinary symptoms    Pending HIDA scan to assess gallbladder  Continue cefazolin and Flagyl for now  Monitor clinically      * Generalized weakness  Assessment & Plan  Likely secondary to acute illness (acute cholecystitis vs. arthritis flare vs. deconditioning)    PT OT recommends rehab with family and patient agrees    Ambulatory dysfunction  Assessment & Plan  Chronic, present on hospitalization  Per patient family report, patient unable to tolerate ambulation with L lower extremity on PT evaluation  Per PT recs, will continue to see pt during current hospitalization to address deficits and provide interventions  Patient reports LLE pain in the L foot with ambulation, tenderness present on posterior medial malleolus    Obtain L foot X-ray for further evaluation  Continue PT interventions and appreciate their recommendations      Radiographic suspicion normal pressure hydrocephalus on CT head  Assessment & Plan  CT head: No acute intracranial abnormality is seen.  Prominence of the ventricles out of proportion for volume loss raising suspicion for possible normal pressure hydrocephalus.  Patient alert and oriented without urinary incontinence, low clinical suspicion for NPH  However, MRI with poor quality but also potential ventricular dilation - patient would benefit from  outpatient follow-up in the NPH clinic       Elevated liver enzymes  Assessment & Plan  Total bilirubin remains elevated but alk phos normal, no right upper quadrant pain.  RUQ US demonstrated numerous gallstones with gallbladder wall thickening.   Negative Raymond on exam  Patient with previous fever Tmax of 102 F on 8/2  Intermittent to low suspicion for acute cholecystitis     Obtain HIDA scan for further evaluation of acute cholecystitis      Altered mental status  Assessment & Plan  Documented history of some confusion at home as per family.  The patient could have had acute metabolic encephalopathy given his poor cognitive reserve and arthritis with prior fever.  Neurological exam nonfocal despite CT and MRI findings.  Alert and Oriented to person, place, and month/year     Continue to monitor    Pain and swelling of left upper extremity  Assessment & Plan  X-ray left upper extremities with no acute findings  Inflammatory markers elevated  Patient does have a history of arthritis, unclear type, at some point someone question rheumatoid -he does have a positive rheumatoid factor so suspect rheumatoid arthritis.  Erythema, edema, and pain markedly improved since initial presentation    Continue Voltaren gel for now as it seems to be working well. If worsening can consider a course of systemic steroids.    Atrial fibrillation (HCC)  Assessment & Plan  Rate controlled  Continue Cardizem to 40 mg daily  Patient is on warfarin 2.5 mg daily  Daily INR was arranged 2-3    Chronic acquired lymphedema  Assessment & Plan  Swelling in L hand now resolved with normal ROM  Continue diuretics  Monitor renal function           VTE Pharmacologic Prophylaxis: VTE Score: 3 Warfarin    Mobility:   Basic Mobility Inpatient Raw Score: 6  JH-HLM Goal: 2: Bed activities/Dependent transfer  JH-HLM Achieved: 2: Bed activities/Dependent transfer  JH-HLM Goal NOT achieved. Continue with multidisciplinary rounding and encourage appropriate  mobility to improve upon [facility] goals.    Patient Centered Rounds: I performed bedside rounds with nursing staff today.  Discussions with Specialists or Other Care Team Provider:  IP CONSULT TO NEUROLOGY      Education and Discussions with Family / Patient: Family at bedside    Current Length of Stay: 3 day(s)  Current Patient Status: Inpatient   Certification Statement: The patient will continue to require additional inpatient hospital stay due to plan as noted above  Discharge Plan: Anticipate discharge in 48-72 hrs to rehab facility.    Code Status: Level 1 - Full Code    Subjective:   Patient feels overall well today. Son at bedside endorses patient having ambulatory dysfunction with physical therapy meeting this past Saturday. Patient reports having L foot pain that limits ambulation, which has been a chronic issue for him. The pain and swelling in his L hand have markedly improved since admission.    He denies any chest pain, palpitations, shortness of breath, fever, chills, nausea, or vomiting.    Objective:     Vitals:   Temp (24hrs), Av.7 °F (37.1 °C), Min:97.5 °F (36.4 °C), Max:99.6 °F (37.6 °C)    Temp:  [97.5 °F (36.4 °C)-99.6 °F (37.6 °C)] 97.5 °F (36.4 °C)  HR:  [] 86  Resp:  [17-18] 17  BP: (135-148)/(85-94) 135/85  SpO2:  [94 %-95 %] 94 %  Body mass index is 37.01 kg/m².     Input and Output Summary (last 24 hours):     Intake/Output Summary (Last 24 hours) at 2024 1228  Last data filed at 2024 1001  Gross per 24 hour   Intake 0 ml   Output 650 ml   Net -650 ml       Physical Exam:   Physical Exam  Vitals and nursing note reviewed.   Constitutional:       Appearance: Normal appearance. He is obese.   HENT:      Head: Normocephalic and atraumatic.      Right Ear: External ear normal.      Left Ear: External ear normal.      Ears:      Comments: Requires hearing aid in L ear, not wearing one on exam     Nose: Nose normal. No congestion or rhinorrhea.      Mouth/Throat:      Mouth:  Mucous membranes are moist.      Pharynx: Oropharynx is clear. No oropharyngeal exudate or posterior oropharyngeal erythema.   Eyes:      General: No scleral icterus.        Right eye: No discharge.         Left eye: No discharge.      Pupils: Pupils are equal, round, and reactive to light.      Comments: Wearing glasses   Neck:      Vascular: No carotid bruit.   Cardiovascular:      Rate and Rhythm: Normal rate. Rhythm irregularly irregular.      Pulses: Normal pulses.           Radial pulses are 2+ on the right side and 2+ on the left side.      Heart sounds: Heart sounds are distant. No murmur heard.     No friction rub. No gallop.   Pulmonary:      Effort: Pulmonary effort is normal. No respiratory distress.      Breath sounds: Normal breath sounds. No stridor. No wheezing, rhonchi or rales.   Abdominal:      General: Abdomen is flat. Bowel sounds are normal. There is no distension.      Palpations: Abdomen is soft. There is no mass.      Tenderness: There is no abdominal tenderness. There is no guarding or rebound. Negative signs include Raymond's sign.      Hernia: No hernia is present.   Musculoskeletal:         General: No swelling, tenderness, deformity or signs of injury. Normal range of motion.      Cervical back: Normal range of motion. No rigidity. No muscular tenderness.   Lymphadenopathy:      Cervical: No cervical adenopathy.   Skin:     General: Skin is warm and dry.      Capillary Refill: Capillary refill takes less than 2 seconds.      Coloration: Skin is not jaundiced or pale.      Findings: No bruising or erythema.      Comments: Ashen, scaling rash on bilateral lower extremities extending from ankle to mid-calf   Neurological:      General: No focal deficit present.      Mental Status: He is alert and oriented to person, place, and time. Mental status is at baseline.      Cranial Nerves: No cranial nerve deficit.      Sensory: No sensory deficit.      Motor: No weakness.      Coordination:  Coordination normal.      Deep Tendon Reflexes: Reflexes normal.   Psychiatric:         Mood and Affect: Mood normal.         Behavior: Behavior normal.         Thought Content: Thought content normal.         Judgment: Judgment normal.          Additional Data:     Labs:  Results from last 7 days   Lab Units 08/05/24  0635   WBC Thousand/uL 5.14   HEMOGLOBIN g/dL 11.5*   HEMATOCRIT % 35.9*   PLATELETS Thousands/uL 157   SEGS PCT % 67   LYMPHO PCT % 18   MONO PCT % 12   EOS PCT % 2     Results from last 7 days   Lab Units 08/05/24  0635   SODIUM mmol/L 138   POTASSIUM mmol/L 3.9   CHLORIDE mmol/L 102   CO2 mmol/L 29   BUN mg/dL 26*   CREATININE mg/dL 0.96   ANION GAP mmol/L 7   CALCIUM mg/dL 9.1   ALBUMIN g/dL 3.6   TOTAL BILIRUBIN mg/dL 1.07*   ALK PHOS U/L 85   ALT U/L 10   AST U/L 27   GLUCOSE RANDOM mg/dL 105     Results from last 7 days   Lab Units 08/05/24  0635   INR  2.30*             Results from last 7 days   Lab Units 08/02/24  0348   PROCALCITONIN ng/ml 1.08*       Lines/Drains:  Invasive Devices       Peripheral Intravenous Line  Duration             Peripheral IV 08/03/24 Dorsal (posterior);Right Wrist 1 day                      Imaging: Reviewed radiology reports from this admission including:   US right upper quadrant    Result Date: 8/2/2024  Impression: 1.  Numerous gallstones with gallbladder wall thickening, potentially accentuated by under distention. Other findings concerning for acute inflammation or absent. Suspicion for acute cholecystitis based on imaging appearance is intermediate to low. 2.  Otherwise, no acute abnormalities or imaging findings to explain mild hyperbilirubinemia. Resident: Macho Santiago I, the attending radiologist, have reviewed the images and agree with the final report above. Workstation performed: [id number]     CT upper extremity w contrast left    Result Date: 8/2/2024  Impression: Subcutaneous edema greater dorsally from the wrist to the hand may reflect  cellulitis. No discrete abscess or soft tissue gas. No radiographic evidence for osteomyelitis. Arthropathy as above. Workstation performed: [id number]     XR hand 3+ views LEFT    Result Date: 8/2/2024  Impression: Degenerative changes in the distal interphalangeal joint of the fingers Computerized Assisted Algorithm (CAA) may have been used to analyze all applicable images. Workstation performed: [id number]     XR chest 1 view portable    Result Date: 8/2/2024  Impression: No acute consolidation Cardiomegaly Workstation performed: [id number]     CT head without contrast    Result Date: 8/2/2024  Impression: No acute intracranial abnormality is seen. Prominence of the ventricles out of proportion for volume loss raising suspicion for possible normal pressure hydrocephalus. Correlation with the patient's symptoms recommended. Workstation performed: [id number]       No Chest XR results available for this patient.     Results for orders placed during the hospital encounter of 08/01/24    Echo complete w/ contrast if indicated    Interpretation Summary    Left Ventricle: Left ventricular cavity size is normal. Wall thickness is normal. The left ventricular ejection fraction is 55%. Systolic function is normal. Wall motion is normal. Diastolic function is moderately abnormal, consistent with grade II (pseudonormal) relaxation.    Left Atrium: The atrium is severely dilated.    Right Atrium: The atrium is moderately dilated.    Aortic Valve: The aortic valve is trileaflet. The leaflets are moderately thickened. The leaflets are not calcified. The leaflets exhibit normal mobility.    Mitral Valve: There is mild annular calcification. There is mild to moderate regurgitation.    Tricuspid Valve: There is moderate regurgitation.    Aorta: The aortic root is mildly dilated. The ascending aorta is mildly dilated.      Recent Cultures (last 7 days):   Results from last 7 days   Lab Units 08/03/24  0912   BLOOD CULTURE  No  Growth at 24 hrs.  No Growth at 24 hrs.       Last 24 Hours Medication List:   Current Facility-Administered Medications   Medication Dose Route Frequency Provider Last Rate    acetaminophen  650 mg Oral Q6H PRN Kim Zaldivar MD      atorvastatin  20 mg Oral Daily Kim Zaldivar MD      cefazolin  2,000 mg Intravenous Q8H Bienvenido Torres MD 2,000 mg (08/05/24 0818)    colchicine  0.6 mg Oral Daily Bienvenido Torres MD      Diclofenac Sodium  2 g Topical 4x Daily Bienvenido Torres MD      diltiazem  240 mg Oral Daily Kim Zaldivar MD      furosemide  40 mg Intravenous Daily Kim Zaldivar MD      melatonin  3 mg Oral HS PRN Diana Purcell PA-C      metroNIDAZOLE  500 mg Intravenous Q8H Bienvenido Torres  mg (08/05/24 0817)    predniSONE  40 mg Oral Daily Bienvenido Torres MD      tamsulosin  0.4 mg Oral Daily With Dinner Kim Zaldivar MD      warfarin  2.5 mg Oral Daily (warfarin) Kim Zaldivar MD          Today, Patient Was Seen By: Alexis Vasquez and the attending physician, Bienvenido Torres,*      **Please Note: This note may have been constructed using a voice recognition system.**

## 2024-08-05 NOTE — PLAN OF CARE
Problem: Prexisting or High Potential for Compromised Skin Integrity  Goal: Skin integrity is maintained or improved  Description: INTERVENTIONS:  - Identify patients at risk for skin breakdown  - Assess and monitor skin integrity  - Assess and monitor nutrition and hydration status  - Monitor labs   - Assess for incontinence   - Turn and reposition patient  - Assist with mobility/ambulation  - Relieve pressure over bony prominences  - Avoid friction and shearing  - Provide appropriate hygiene as needed including keeping skin clean and dry  - Evaluate need for skin moisturizer/barrier cream  - Collaborate with interdisciplinary team   - Patient/family teaching  - Consider wound care consult   Outcome: Progressing     Problem: Nutrition/Hydration-ADULT  Goal: Nutrient/Hydration intake appropriate for improving, restoring or maintaining nutritional needs  Description: Monitor and assess patient's nutrition/hydration status for malnutrition. Collaborate with interdisciplinary team and initiate plan and interventions as ordered.  Monitor patient's weight and dietary intake as ordered or per policy. Utilize nutrition screening tool and intervene as necessary. Determine patient's food preferences and provide high-protein, high-caloric foodsappropriate.     INTERVENTIONS:  - Monitor oral intake, urinary output, labs, and treatment plans  - Assess nutrition and hydration status and recommend course of action  - Evaluate amount of meals eaten  - Assist patient with eating if necessary   - Allow adequate time for meals  - Recommend/ encourage appropriate diets, oral nutritional supplements, and vitamin/mineral supplements  - Order, calculate, and assess calorie counts as needed  - Recommend, monitor, and adjust tube feedings and TPN/PPN based on assessed needs  - Assess need for intravenous fluids  - Provide specific nutrition/hydration education as appropriate  - Include patient/family/caregiver in decisions related to  nutrition  Outcome: Progressing

## 2024-08-05 NOTE — ASSESSMENT & PLAN NOTE
CT head: No acute intracranial abnormality is seen.  Prominence of the ventricles out of proportion for volume loss raising suspicion for possible normal pressure hydrocephalus.  Patient alert and oriented without urinary incontinence, low clinical suspicion for NPH  However, MRI with poor quality but also potential ventricular dilation - patient would benefit from outpatient follow-up in the NPH clinic

## 2024-08-05 NOTE — ASSESSMENT & PLAN NOTE
The patient did have some fever on 8/2 at night, temperature was as high as 102  Noted left hand arthritis the patient does not have any other localizing signs of infection although he did have an ultrasound that was done which did reveal some cholelithiasis  with some potential gallbladder wall thickening, the patient denies any right upper quadrant pain however.  UA relatively benign and has no urinary symptoms    Pending HIDA scan to assess gallbladder  Continue antibiotics for now but consider discontinuation if HIDA completely negative  Monitor clinically    If HIDA negative it is possible that the fever episode could be related to RA

## 2024-08-05 NOTE — ASSESSMENT & PLAN NOTE
Likely secondary to acute illness (acute cholecystitis vs. arthritis flare vs. deconditioning)    PT OT recommends rehab with family and patient agrees

## 2024-08-05 NOTE — ARC ADMISSION
Referral received for consideration of patient for Inpatient Acute Rehab.  After reviewing patients case at this time recommendation is for a slower paced therapy program given over a longer period of time such as in a Sub-Acute/TCU to maximize patients functional progress.

## 2024-08-05 NOTE — ASSESSMENT & PLAN NOTE
Chronic, present on hospitalization  Per patient family report, patient unable to tolerate ambulation with L lower extremity on PT evaluation  Per PT recs, will continue to see pt during current hospitalization to address deficits and provide interventions  Patient reports LLE pain in the L foot with ambulation, tenderness present on posterior medial malleolus    Obtain L foot X-ray for further evaluation  Continue PT interventions and appreciate their recommendations

## 2024-08-05 NOTE — CASE MANAGEMENT
Case Management Discharge Planning Note    Patient name Antonio Duncan  Location /-01 MRN [id number]  : 1940 Date 2024       Current Admission Date: 2024  Current Admission Diagnosis:Generalized weakness   Patient Active Problem List    Diagnosis Date Noted Date Diagnosed    Fever 2024     Generalized weakness 2024     Chronic acquired lymphedema 2024     CHF (congestive heart failure) (HCC) 2024     Atrial fibrillation (HCC) 2024     Pain and swelling of left upper extremity 2024     Altered mental status 2024     Elevated liver enzymes 2024     Radiographic suspicion normal pressure hydrocephalus on CT head 2024       LOS (days): 3  Geometric Mean LOS (GMLOS) (days): 3.9  Days to GMLOS:0.6     OBJECTIVE:  Risk of Unplanned Readmission Score: 10.85         Current admission status: Inpatient   Preferred Pharmacy:   [id number] - [facility] [place] - [address]  [address]  [facility] [place] [address]  Phone: [phone] Fax: [phone]    Primary Care Provider: Rudi Garza MD    Primary Insurance: MEDICARE  Secondary Insurance:  FOR LIFE    DISCHARGE DETAILS:    Discharge planning discussed with:: Pt and his son Vj  Freedom of Choice: Yes  Comments - Freedom of Choice: CM met with pt and his son Vj at bedside and provided pt son with pt choice list for available STR. Pt and son choice was [facility] due to distance and location. [facility] reserved on [facility]. As per SLIM, pt is anticiapted for dc tomorrow pending HIDA scan. PASSAR provided and COVID test pending.  CM contacted family/caregiver?: Yes  Were Treatment Team discharge recommendations reviewed with patient/caregiver?: Yes  Did patient/caregiver verbalize understanding of patient care needs?: Yes  Were patient/caregiver advised of the risks associated with not following Treatment Team discharge recommendations?:  Yes    Contacts  Patient Contacts: Vj Bhakta (Son)  Relationship to Patient:: Family  Contact Method: In Person  Reason/Outcome: Continuity of Care, Emergency Contact, Referral, Discharge Planning              Other Referral/Resources/Interventions Provided:  Interventions: Short Term Rehab    Would you like to participate in our [facility] service program?  : No - Declined    Treatment Team Recommendation: Short Term Rehab  Discharge Destination Plan:: Short Term Rehab  Transport at Discharge : [facility]  Dispatcher Contacted: No                          IMM Given (Date):: 08/05/24  IMM Given to:: Patient  Family notified:: Pt and pt son Vj  Additional Comments: CM reviewed IMM with pt and his son at [facility]. Full and complete understanding expressed. Copy given and verbal consent obtained

## 2024-08-05 NOTE — OCCUPATIONAL THERAPY NOTE
Occupational Therapy Cancellation Note        Patient Name: Antonio Duncan  Today's Date: 8/5/2024 08/05/24 1256   OT Last Visit   OT Visit Date 08/05/24   Note Type   Note Type Cancelled Session   Cancel Reasons Medical status  (Chart review indicates patient has new complaint of Left foot pain/ x ray ordered and pending.  Therapist discussed patient with SLIM, who recommended  to hold treatment at this time. OT will continue to follow patient and treat as indicated.)

## 2024-08-05 NOTE — PLAN OF CARE
Problem: MUSCULOSKELETAL - ADULT  Goal: Maintain or return mobility to safest level of function  Description: INTERVENTIONS:  - Assess patient's ability to carry out ADLs; assess patient's baseline for ADL function and identify physical deficits which impact ability to perform ADLs (bathing, care of mouth/teeth, toileting, grooming, dressing, etc.)  - Assess/evaluate cause of self-care deficits   - Assess range of motion  - Assess patient's mobility  - Assess patient's need for assistive devices and provide as appropriate  - Encourage maximum independence but intervene and supervise when necessary  - Involve family in performance of ADLs  - Assess for home care needs following discharge   - Consider OT consult to assist with ADL evaluation and planning for discharge  - Provide patient education as appropriate  Outcome: Progressing     Problem: MUSCULOSKELETAL - ADULT  Goal: Maintain proper alignment of affected body part  Description: INTERVENTIONS:  - Support, maintain and protect limb and body alignment  - Provide patient/ family with appropriate education  Outcome: Progressing     Problem: Prexisting or High Potential for Compromised Skin Integrity  Goal: Skin integrity is maintained or improved  Description: INTERVENTIONS:  - Identify patients at risk for skin breakdown  - Assess and monitor skin integrity  - Assess and monitor nutrition and hydration status  - Monitor labs   - Assess for incontinence   - Turn and reposition patient  - Assist with mobility/ambulation  - Relieve pressure over bony prominences  - Avoid friction and shearing  - Provide appropriate hygiene as needed including keeping skin clean and dry  - Evaluate need for skin moisturizer/barrier cream  - Collaborate with interdisciplinary team   - Patient/family teaching  - Consider wound care consult   Outcome: Progressing

## 2024-08-05 NOTE — ASSESSMENT & PLAN NOTE
Wt Readings from Last 3 Encounters:   08/05/24 117 kg (257 lb 15 oz)   08/21/17 117 kg (257 lb 4 oz)   04/21/17 112 kg (247 lb 2 oz)     Patient appears to have slightly worsening edema on admission.  Suspicion for acute on chronic heart failure with preserved ejection fraction earlier during hospitalization    Now feels much better  Stop IV lasix as appears close to euvolemic now

## 2024-08-05 NOTE — ASSESSMENT & PLAN NOTE
Total bilirubin remains elevated but alk phos normal, no right upper quadrant pain.  RUQ US demonstrated numerous gallstones with gallbladder wall thickening.   Negative Raymond on exam  Patient with previous fever Tmax of 102 F on 8/2  Intermittent to low suspicion for acute cholecystitis     Obtain HIDA scan for further evaluation of acute cholecystitis

## 2024-08-05 NOTE — PHYSICAL THERAPY NOTE
Physical Therapy Cancellation Note    Chart review performed. At this time, PT treatment session cancelled as patient is pending L foot xray; discussed with Dr. Rivera, will hold PT session at this time. PT will follow and provide PT interventions as appropriate.         08/05/24 1259   PT Last Visit   PT Visit Date 08/05/24   Note Type   Note type Cancelled Session;Evaluation   Cancel Reasons Medical status       Letty Wilcox, PT

## 2024-08-05 NOTE — ASSESSMENT & PLAN NOTE
Documented history of some confusion at home as per family.  The patient could have had acute metabolic encephalopathy given his poor cognitive reserve and arthritis with prior fever.  Neurological exam nonfocal despite CT and MRI findings.  Alert and Oriented to person, place, and month/year     Continue to monitor

## 2024-08-06 VITALS
DIASTOLIC BLOOD PRESSURE: 81 MMHG | BODY MASS INDEX: 35.57 KG/M2 | OXYGEN SATURATION: 89 % | RESPIRATION RATE: 18 BRPM | HEIGHT: 70 IN | WEIGHT: 248.46 LBS | HEART RATE: 104 BPM | SYSTOLIC BLOOD PRESSURE: 130 MMHG | TEMPERATURE: 97.8 F

## 2024-08-06 LAB
ALBUMIN SERPL BCG-MCNC: 3.5 G/DL (ref 3.5–5)
ALP SERPL-CCNC: 83 U/L (ref 34–104)
ALT SERPL W P-5'-P-CCNC: 8 U/L (ref 7–52)
ANION GAP SERPL CALCULATED.3IONS-SCNC: 7 MMOL/L (ref 4–13)
AST SERPL W P-5'-P-CCNC: 23 U/L (ref 13–39)
BASOPHILS # BLD AUTO: 0.01 THOUSANDS/ÂΜL (ref 0–0.1)
BASOPHILS NFR BLD AUTO: 0 % (ref 0–1)
BILIRUB SERPL-MCNC: 0.82 MG/DL (ref 0.2–1)
BUN SERPL-MCNC: 30 MG/DL (ref 5–25)
CALCIUM SERPL-MCNC: 9.4 MG/DL (ref 8.4–10.2)
CCP AB SER IA-ACNC: 1.5
CHLORIDE SERPL-SCNC: 104 MMOL/L (ref 96–108)
CO2 SERPL-SCNC: 28 MMOL/L (ref 21–32)
CREAT SERPL-MCNC: 0.87 MG/DL (ref 0.6–1.3)
EOSINOPHIL # BLD AUTO: 0 THOUSAND/ÂΜL (ref 0–0.61)
EOSINOPHIL NFR BLD AUTO: 0 % (ref 0–6)
ERYTHROCYTE [DISTWIDTH] IN BLOOD BY AUTOMATED COUNT: 15 % (ref 11.6–15.1)
GFR SERPL CREATININE-BSD FRML MDRD: 79 ML/MIN/1.73SQ M
GLUCOSE SERPL-MCNC: 122 MG/DL (ref 65–140)
HCT VFR BLD AUTO: 35.9 % (ref 36.5–49.3)
HGB BLD-MCNC: 11.5 G/DL (ref 12–17)
IMM GRANULOCYTES # BLD AUTO: 0.04 THOUSAND/UL (ref 0–0.2)
IMM GRANULOCYTES NFR BLD AUTO: 1 % (ref 0–2)
INR PPP: 2.82 (ref 0.85–1.19)
LYMPHOCYTES # BLD AUTO: 0.74 THOUSANDS/ÂΜL (ref 0.6–4.47)
LYMPHOCYTES NFR BLD AUTO: 14 % (ref 14–44)
MCH RBC QN AUTO: 30.5 PG (ref 26.8–34.3)
MCHC RBC AUTO-ENTMCNC: 32 G/DL (ref 31.4–37.4)
MCV RBC AUTO: 95 FL (ref 82–98)
MONOCYTES # BLD AUTO: 0.51 THOUSAND/ÂΜL (ref 0.17–1.22)
MONOCYTES NFR BLD AUTO: 10 % (ref 4–12)
NEUTROPHILS # BLD AUTO: 3.94 THOUSANDS/ÂΜL (ref 1.85–7.62)
NEUTS SEG NFR BLD AUTO: 75 % (ref 43–75)
NRBC BLD AUTO-RTO: 0 /100 WBCS
PLATELET # BLD AUTO: 167 THOUSANDS/UL (ref 149–390)
PMV BLD AUTO: 9.5 FL (ref 8.9–12.7)
POTASSIUM SERPL-SCNC: 3.9 MMOL/L (ref 3.5–5.3)
PROT SERPL-MCNC: 6.8 G/DL (ref 6.4–8.4)
PROTHROMBIN TIME: 30.3 SECONDS (ref 12.3–15)
RBC # BLD AUTO: 3.77 MILLION/UL (ref 3.88–5.62)
SODIUM SERPL-SCNC: 139 MMOL/L (ref 135–147)
WBC # BLD AUTO: 5.24 THOUSAND/UL (ref 4.31–10.16)

## 2024-08-06 PROCEDURE — 85610 PROTHROMBIN TIME: CPT | Performed by: INTERNAL MEDICINE

## 2024-08-06 PROCEDURE — 97530 THERAPEUTIC ACTIVITIES: CPT

## 2024-08-06 PROCEDURE — 80053 COMPREHEN METABOLIC PANEL: CPT | Performed by: INTERNAL MEDICINE

## 2024-08-06 PROCEDURE — 97110 THERAPEUTIC EXERCISES: CPT

## 2024-08-06 PROCEDURE — 99238 HOSP IP/OBS DSCHRG MGMT 30/<: CPT | Performed by: STUDENT IN AN ORGANIZED HEALTH CARE EDUCATION/TRAINING PROGRAM

## 2024-08-06 PROCEDURE — 85025 COMPLETE CBC W/AUTO DIFF WBC: CPT | Performed by: INTERNAL MEDICINE

## 2024-08-06 RX ORDER — COLCHICINE 0.6 MG/1
0.6 TABLET ORAL DAILY
Start: 2024-08-07 | End: 2024-08-12

## 2024-08-06 RX ORDER — PREDNISONE 20 MG/1
40 TABLET ORAL DAILY
Start: 2024-08-07 | End: 2024-08-10

## 2024-08-06 RX ORDER — FUROSEMIDE 40 MG/1
40 TABLET ORAL DAILY
Status: DISCONTINUED | OUTPATIENT
Start: 2024-08-06 | End: 2024-08-06 | Stop reason: HOSPADM

## 2024-08-06 RX ORDER — LANOLIN ALCOHOL/MO/W.PET/CERES
3 CREAM (GRAM) TOPICAL
Start: 2024-08-06

## 2024-08-06 RX ORDER — ACETAMINOPHEN 325 MG/1
650 TABLET ORAL EVERY 6 HOURS PRN
Start: 2024-08-06

## 2024-08-06 RX ADMIN — ATORVASTATIN CALCIUM 20 MG: 20 TABLET, FILM COATED ORAL at 10:12

## 2024-08-06 RX ADMIN — FUROSEMIDE 40 MG: 40 TABLET ORAL at 10:12

## 2024-08-06 RX ADMIN — DILTIAZEM HYDROCHLORIDE 240 MG: 240 CAPSULE, COATED, EXTENDED RELEASE ORAL at 10:12

## 2024-08-06 RX ADMIN — Medication 2 G: at 10:20

## 2024-08-06 RX ADMIN — PREDNISONE 40 MG: 20 TABLET ORAL at 10:12

## 2024-08-06 RX ADMIN — COLCHICINE 0.6 MG: 0.6 TABLET ORAL at 10:12

## 2024-08-06 NOTE — ASSESSMENT & PLAN NOTE
Chronic, present on hospitalization  Per patient family report, patient unable to tolerate ambulation with L lower extremity on PT evaluation  Per PT recs, will continue to see pt during current hospitalization to address deficits and provide interventions  Patient reports LLE pain in the L foot with ambulation, tenderness present on posterior medial malleolus  L foot x-ray: No acute osseous abnormality  Continue PT interventions and appreciate their recommendations

## 2024-08-06 NOTE — ASSESSMENT & PLAN NOTE
The patient did have some fever on 8/2 at night, temperature was as high as 102  Noted left hand arthritis the patient does not have any other localizing signs of infection although he did have an ultrasound that was done which did reveal some cholelithiasis  with some potential gallbladder wall thickening, the patient denies any right upper quadrant pain however.  UA relatively benign and has no urinary symptoms    HIDA negative   Discontinued antibiotics    Monitor clinically    If HIDA negative it is possible that the fever episode could be related to RA

## 2024-08-06 NOTE — ASSESSMENT & PLAN NOTE
Total bilirubin remains elevated but alk phos normal, no right upper quadrant pain.  RUQ US demonstrated numerous gallstones with gallbladder wall thickening.   Negative Raymond on exam  Patient with previous fever Tmax of 102 F on 8/2  Intermittent to low suspicion for acute cholecystitis     HIDA scan negative

## 2024-08-06 NOTE — PLAN OF CARE
Problem: PHYSICAL THERAPY ADULT  Goal: Performs mobility at highest level of function for planned discharge setting.  See evaluation for individualized goals.  Description: Treatment/Interventions: Functional transfer training, LE strengthening/ROM, Therapeutic exercise, Endurance training, Cognitive reorientation, Patient/family training, Bed mobility, Continued evaluation, Spoke to nursing, OT, Family          See flowsheet documentation for full assessment, interventions and recommendations.  Outcome: Progressing  Note: Prognosis: Good  Problem List: Decreased strength, Decreased endurance, Impaired balance, Decreased mobility, Decreased coordination, Decreased cognition, Impaired hearing  Assessment: Chart review and two person identifiers were completed. Pt seen for PT treatment session this date, consisting of ther act focused on functional mobility, bed mobility, transfers and ther ex focused on strengthening. Since previous session, pt has made good progress in terms of ability to complete transfer from bed to chair. Pt greeted supine in bed and agreeable to PT session. Pt completed supine to sit with mod A x2. Pt denied any lightheadedness/dizziness during or after transition. Pt completed STS with min A x2 with RW and was able to stand for ~2 minutes with no LOB. Pt was able to complete 2 side steps with min A x2 and RW. Pt completed stand to sit with min a x2. Pt completed STS with min A x2 and RW and was able to ambulate 2' to bedside chair. Pt completed stand to sit with min A x2. Pt completed seated LE exercises with no complaints of increased pain.  Pt ended session seated in recliner with alarm activated and all needs within reach. Spoke to RN about session outcomes. Pertinent barriers during this session include pain and cognitive status. Current goals and POC established on IE remain appropriate. Pt prognosis for achieving goals is good, pending pt progress with hospitalization/medical status  improvements, and indicated by ability to follow directions, responsive to cues/strategies, and supportive family/caregivers. Pt limited d/t cognitive status. Pt continues to be functioning below baseline level, and remains limited due to factors listed above. PT will continue to see pt during current hospitalization in order to address the deficits listed above and provide interventions consistent w/ POC in effort to achieve STGs. PT recommends level 2, moderate resource intensity upon discharge.  Barriers to Discharge: Decreased caregiver support, Inaccessible home environment     Rehab Resource Intensity Level, PT: II (Moderate Resource Intensity)    See flowsheet documentation for full assessment.

## 2024-08-06 NOTE — PHYSICAL THERAPY NOTE
PHYSICAL THERAPY NOTE          Patient Name: Antonio Duncan  Today's Date: 8/6/2024 08/06/24 1256   PT Last Visit   PT Visit Date 08/06/24   Note Type   Note Type Treatment   Pain Assessment   Pain Assessment Tool 0-10   Pain Score 3   Pain Location/Orientation Orientation: Bilateral;Location: Leg   Pain Onset/Description Onset: Ongoing;Frequency: Constant/Continuous   Hospital Pain Intervention(s) Repositioned;Ambulation/increased activity   Restrictions/Precautions   Weight Bearing Precautions Per Order No   Other Precautions Chair Alarm;Bed Alarm;Cognitive;Fall Risk;Pain   General   Chart Reviewed Yes   Response to Previous Treatment Patient with no complaints from previous session.   Family/Caregiver Present Yes   Cognition   Overall Cognitive Status Impaired   Arousal/Participation Alert;Responsive;Cooperative   Attention Attends with cues to redirect   Orientation Level Oriented to person;Disoriented to place;Disoriented to time;Disoriented to situation   Memory Decreased recall of recent events;Decreased short term memory   Following Commands Follows one step commands with increased time or repetition   Comments Pt agreeable to PT session   Bed Mobility   Supine to Sit 3  Moderate assistance   Additional items Assist x 2;HOB elevated;Bedrails;Increased time required;Verbal cues;LE management   Transfers   Sit to Stand 4  Minimal assistance   Additional items Assist x 2;Bedrails;Increased time required;Verbal cues   Stand to Sit 4  Minimal assistance   Additional items Assist x 2;Bedrails;Increased time required;Verbal cues   Additional Comments with RW   Ambulation/Elevation   Gait pattern Decreased foot clearance;Shuffling;Excessively slow;Decreased hip extension;Decreased heel strike;Decreased toe off   Gait Assistance 4  Minimal assist   Additional items Assist x 2;Verbal cues   Assistive Device Rolling walker    Distance 2'   Balance   Static Sitting Fair   Dynamic Sitting Fair -   Static Standing Poor +   Dynamic Standing Poor +   Ambulatory Poor   Endurance Deficit   Endurance Deficit Yes   Activity Tolerance   Activity Tolerance Patient tolerated treatment well   Medical Staff Made Aware LASHELL Bernard  (pt seen as co-treatment with OT due to co-morbidities, present impairments, and possible assistance of 2 for functional mobility)   Nurse Made Aware CAM Sarkar   Exercises   Hip Adduction Sitting;10 reps;AROM;Bilateral   Knee AROM Long Arc Quad Sitting;10 reps;AROM;Bilateral   Ankle Pumps Sitting;10 reps;AROM;Bilateral   Assessment   Prognosis Good   Problem List Decreased strength;Decreased endurance;Impaired balance;Decreased mobility;Decreased coordination;Decreased cognition;Impaired hearing   Assessment Chart review and two person identifiers were completed. Pt seen for PT treatment session this date, consisting of ther act focused on functional mobility, bed mobility, transfers and ther ex focused on strengthening. Since previous session, pt has made good progress in terms of ability to complete transfer from bed to chair. Pt greeted supine in bed and agreeable to PT session. Pt completed supine to sit with mod A x2. Pt denied any lightheadedness/dizziness during or after transition. Pt completed STS with min A x2 with RW and was able to stand for ~2 minutes with no LOB. Pt was able to complete 2 side steps with min A x2 and RW. Pt completed stand to sit with min a x2. Pt completed STS with min A x2 and RW and was able to ambulate 2' to bedside chair. Pt completed stand to sit with min A x2. Pt completed seated LE exercises with no complaints of increased pain.  Pt ended session seated in recliner with alarm activated and all needs within reach. Spoke to RN about session outcomes. Pertinent barriers during this session include pain and cognitive status. Current goals and POC established on IE remain appropriate. Pt  prognosis for achieving goals is good, pending pt progress with hospitalization/medical status improvements, and indicated by ability to follow directions, responsive to cues/strategies, and supportive family/caregivers. Pt limited d/t cognitive status. Pt continues to be functioning below baseline level, and remains limited due to factors listed above. PT will continue to see pt during current hospitalization in order to address the deficits listed above and provide interventions consistent w/ POC in effort to achieve STGs. PT recommends level 2, moderate resource intensity upon discharge.   Barriers to Discharge Decreased caregiver support;Inaccessible home environment   Goals   Patient Goals to get better   STG Expiration Date 08/12/24   PT Treatment Day 3   Plan   Treatment/Interventions Functional transfer training;LE strengthening/ROM;Elevations;Therapeutic exercise;Endurance training;Patient/family training;Equipment eval/education;Bed mobility;Gait training;Continued evaluation   Progress Progressing toward goals   PT Frequency 3-5x/wk   Discharge Recommendation   Rehab Resource Intensity Level, PT II (Moderate Resource Intensity)   Equipment Recommended Walker   AM-PAC Basic Mobility Inpatient   Turning in Flat Bed Without Bedrails 1   Lying on Back to Sitting on Edge of Flat Bed Without Bedrails 1   Moving Bed to Chair 2   Standing Up From Chair Using Arms 2   Walk in Room 2   Climb 3-5 Stairs With Railing 1   Basic Mobility Inpatient Raw Score 9   Turning Head Towards Sound 3   Follow Simple Instructions 3   Low Function Basic Mobility Raw Score  15   Low Function Basic Mobility Standardized Score  23.9   [facility] Highest Level Of Mobility   -HLM Goal 3: Sit at edge of bed   -HLM Achieved 5: Stand (1 or more minutes)   Education   Education Provided Mobility training;Home exercise program;Assistive device   Patient Demonstrates acceptance/verbal understanding;Reinforcement needed   End of Consult    Patient Position at End of Consult Bedside chair;Bed/Chair alarm activated;All needs within reach       Filiberto Copeland PT, ABELT

## 2024-08-06 NOTE — ASSESSMENT & PLAN NOTE
X-ray left upper extremities with no acute findings  Inflammatory markers elevated  Patient does have a history of arthritis, unclear type, at some point someone question rheumatoid -he does have a positive rheumatoid factor so suspect rheumatoid arthritis.  Erythema, edema, and pain markedly improved since initial presentation    Continue Voltaren gel for now as it seems to be working well  Also on a course of prednisone

## 2024-08-06 NOTE — OCCUPATIONAL THERAPY NOTE
Occupational Therapy Treatment Note     Patient Name: Antonio Duncan  Today's Date: 8/6/2024  Problem List  Principal Problem:    Generalized weakness  Active Problems:    Chronic acquired lymphedema    CHF (congestive heart failure) (HCC)    Atrial fibrillation (HCC)    Pain and swelling of left upper extremity    Altered mental status    Elevated liver enzymes    Radiographic suspicion normal pressure hydrocephalus on CT head    Fever    Ambulatory dysfunction        08/06/24 1321   OT Last Visit   OT Visit Date 08/06/24   Note Type   Note Type Treatment   Pain Assessment   Pain Assessment Tool 0-10   Pain Score 3   Pain Location/Orientation Orientation: Bilateral;Location: Leg   Pain Onset/Description Onset: Ongoing;Frequency: Constant/Continuous   Hospital Pain Intervention(s) Ambulation/increased activity;Repositioned;Medication (See MAR)   Restrictions/Precautions   Weight Bearing Precautions Per Order No   Other Precautions Chair Alarm;Bed Alarm;Cognitive;Fall Risk;Pain   Lifestyle   Reciprocal Relationships Son present during session   Bed Mobility   Supine to Sit 3  Moderate assistance   Additional items Assist x 2;HOB elevated;Bedrails;Increased time required;Verbal cues;LE management   Sit to Supine   (DNT: pt seated OOB in recliner at end of session)   Additional Comments Pt denied lightheaded/dizziness with transitional movements   Transfers   Sit to Stand 4  Minimal assistance   Additional items Assist x 2;Bedrails;Increased time required;Verbal cues   Stand to Sit 4  Minimal assistance   Additional items Assist x 2;Armrests;Increased time required;Verbal cues   Functional Mobility   Functional Mobility 4  Minimal assistance  (Assist of 2)   Additional Comments Pt ambulated short distance to recliner with no overt SOB. Pt unsteady and requires cues for RW management. Pt requires step-by-step commands for sequencing and processing   Additional items Rolling walker   Therapeutic Exercise - ROM   UE-ROM  Yes   ROM- Right Upper Extremities   R Shoulder AROM;Flexion;Extension  (Protraction/Retraction)   R Position Seated;Against gravity   R Weight/Reps/Sets 1 set x 10 reps each   ROM - Left Upper Extremities    L Shoulder AROM;Flexion;Extension  (Protraction/Retraction)   L Position Seated;Against gravity   L Weight/Reps/Sets 1 set x 10 reps each   Cognition   Overall Cognitive Status Impaired   Arousal/Participation Alert;Responsive;Cooperative   Attention Attends with cues to redirect   Orientation Level Oriented to person;Disoriented to place;Disoriented to time;Disoriented to situation   Memory Decreased short term memory;Decreased recall of recent events   Following Commands Follows one step commands with increased time or repetition   Comments Pt agreeable to OT session   Activity Tolerance   Activity Tolerance Patient limited by pain   Medical Staff Made Aware This session, pt required and most appropriately benefited from skilled OT/PT co-treat due to physical assistance of two therapists to achieve transitional movements and decreased activity tolerance.   Assessment   Assessment Patient participated in Skilled OT session this date with interventions consisting of therapeutic exercise to: increase functional use of BUEs, increase BUE muscle strength ,  therapeutic activities to: increase activity tolerance, and increase dynamic sit/ stand balance during functional activity  . Patient agreeable to OT treatment session, upon arrival patient was found supine in bed and in no apparent distress. Patient completed bed mobility with mod assist of 2. Pt completed functional transfers with min assist of 2 and ambulated to recliner with min assist of 2 utilizing RW. While seated, pt completed 1 set x 10 reps of BUE exercises to increase strength and endurance. In comparison to previous session, patient with improvements in progression in functional mobility and sitting OOB in recliner. Patient requiring verbal cues for  correct technique and one step directives. Patient continues to be functioning below baseline level, occupational performance remains limited secondary to factors listed above and increased risk for falls and injury.   From OT standpoint, recommendation at time of d/c would be Level II (moderate resource intensity).   Patient to benefit from continued Occupational Therapy treatment while in the hospital to address deficits as defined above and maximize level of functional independence with ADLs and functional mobility.   Plan   Treatment Interventions ADL retraining;Functional transfer training;UE strengthening/ROM;Endurance training;Patient/family training;Compensatory technique education   Goal Expiration Date 08/16/24   OT Treatment Day 1   OT Frequency 3-5x/wk   Discharge Recommendation   Rehab Resource Intensity Level, OT II (Moderate Resource Intensity)   AM-PAC Daily Activity Inpatient   Lower Body Dressing 2   Bathing 2   Toileting 2   Upper Body Dressing 3   Grooming 3   Eating 3   Daily Activity Raw Score 15   Daily Activity Standardized Score (Calc for Raw Score >=11) 34.69   AM-PAC Applied Cognition Inpatient   Following a Speech/Presentation 2   Understanding Ordinary Conversation 3   Taking Medications 2   Remembering Where Things Are Placed or Put Away 1   Remembering List of 4-5 Errands 1   Taking Care of Complicated Tasks 1   Applied Cognition Raw Score 10   Applied Cognition Standardized Score 24.98   Modified Enma Scale   Modified Enma Scale 4   End of Consult   Patient Position at End of Consult Bedside chair;Bed/Chair alarm activated;All needs within reach   Nurse Communication Nurse aware of consult     Joana GONZALES, OTR/L

## 2024-08-06 NOTE — PLAN OF CARE
Problem: MUSCULOSKELETAL - ADULT  Goal: Maintain or return mobility to safest level of function  Description: INTERVENTIONS:  - Assess patient's ability to carry out ADLs; assess patient's baseline for ADL function and identify physical deficits which impact ability to perform ADLs (bathing, care of mouth/teeth, toileting, grooming, dressing, etc.)  - Assess/evaluate cause of self-care deficits   - Assess range of motion  - Assess patient's mobility  - Assess patient's need for assistive devices and provide as appropriate  - Encourage maximum independence but intervene and supervise when necessary  - Involve family in performance of ADLs  - Assess for home care needs following discharge   - Consider OT consult to assist with ADL evaluation and planning for discharge  - Provide patient education as appropriate  Outcome: Progressing  Goal: Maintain proper alignment of affected body part  Description: INTERVENTIONS:  - Support, maintain and protect limb and body alignment  - Provide patient/ family with appropriate education  Outcome: Progressing     Problem: Nutrition/Hydration-ADULT  Goal: Nutrient/Hydration intake appropriate for improving, restoring or maintaining nutritional needs  Description: Monitor and assess patient's nutrition/hydration status for malnutrition. Collaborate with interdisciplinary team and initiate plan and interventions as ordered.  Monitor patient's weight and dietary intake as ordered or per policy. Utilize nutrition screening tool and intervene as necessary. Determine patient's food preferences and provide high-protein, high-caloric foods as appropriate.     INTERVENTIONS:  - Monitor oral intake, urinary output, labs, and treatment plans  - Assess nutrition and hydration status and recommend course of action  - Evaluate amount of meals eaten  - Assist patient with eating if necessary   - Allow adequate time for meals  - Recommend/ encourage appropriate diets, oral nutritional  supplements, and vitamin/mineral supplements  - Order, calculate, and assess calorie counts as needed  - Recommend, monitor, and adjust tube feedings and TPN/PPN based on assessed needs  - Assess need for intravenous fluids  - Provide specific nutrition/hydration education as appropriate  - Include patient/family/caregiver in decisions related to nutrition  Outcome: Progressing     Problem: Prexisting or High Potential for Compromised Skin Integrity  Goal: Skin integrity is maintained or improved  Description: INTERVENTIONS:  - Identify patients at risk for skin breakdown  - Assess and monitor skin integrity  - Assess and monitor nutrition and hydration status  - Monitor labs   - Assess for incontinence   - Turn and reposition patient  - Assist with mobility/ambulation  - Relieve pressure over bony prominences  - Avoid friction and shearing  - Provide appropriate hygiene as needed including keeping skin clean and dry  - Evaluate need for skin moisturizer/barrier cream  - Collaborate with interdisciplinary team   - Patient/family teaching  - Consider wound care consult   Outcome: Progressing

## 2024-08-06 NOTE — ASSESSMENT & PLAN NOTE
Rate controlled  Continue Cardizem to 40 mg daily  Patient is on warfarin 2.5 mg daily  Daily INR, last was 2.8

## 2024-08-06 NOTE — PLAN OF CARE
Problem: OCCUPATIONAL THERAPY ADULT  Goal: Performs self-care activities at highest level of function for planned discharge setting.  See evaluation for individualized goals.  Description: Treatment Interventions: ADL retraining, Functional transfer training, UE strengthening/ROM, Endurance training, Patient/family training, Compensatory technique education          See flowsheet documentation for full assessment, interventions and recommendations.   Outcome: Progressing  Note: Limitation: Decreased ADL status, Decreased UE ROM, Decreased UE strength, Decreased cognition, Decreased endurance     Assessment: Patient participated in Skilled OT session this date with interventions consisting of therapeutic exercise to: increase functional use of BUEs, increase BUE muscle strength ,  therapeutic activities to: increase activity tolerance, and increase dynamic sit/ stand balance during functional activity  . Patient agreeable to OT treatment session, upon arrival patient was found supine in bed and in no apparent distress. Patient completed bed mobility with mod assist of 2. Pt completed functional transfers with min assist of 2 and ambulated to recliner with min assist of 2 utilizing RW. While seated, pt completed 1 set x 10 reps of BUE exercises to increase strength and endurance. In comparison to previous session, patient with improvements in progression in functional mobility and sitting OOB in recliner. Patient requiring verbal cues for correct technique and one step directives. Patient continues to be functioning below baseline level, occupational performance remains limited secondary to factors listed above and increased risk for falls and injury.   From OT standpoint, recommendation at time of d/c would be Level II (moderate resource intensity).   Patient to benefit from continued Occupational Therapy treatment while in the hospital to address deficits as defined above and maximize level of functional  independence with ADLs and functional mobility.     Rehab Resource Intensity Level, OT: II (Moderate Resource Intensity)        Joana Mendez OTD, OTR/L

## 2024-08-06 NOTE — CASE MANAGEMENT
Case Management Discharge Planning Note    Patient name Antonio Duncan  Location /-01 MRN [id number]  : 1940 Date 2024       Current Admission Date: 2024  Current Admission Diagnosis:Generalized weakness   Patient Active Problem List    Diagnosis Date Noted Date Diagnosed    Ambulatory dysfunction 2024     Fever 2024     Generalized weakness 2024     Chronic acquired lymphedema 2024     CHF (congestive heart failure) (HCC) 2024     Atrial fibrillation (HCC) 2024     Pain and swelling of left upper extremity 2024     Altered mental status 2024     Elevated liver enzymes 2024     Radiographic suspicion normal pressure hydrocephalus on CT head 2024       LOS (days): 4  Geometric Mean LOS (GMLOS) (days): 3.9  Days to GMLOS:-0.6     OBJECTIVE:  Risk of Unplanned Readmission Score: 11.72         Current admission status: Inpatient   Preferred Pharmacy:   [id number] - [place] - [address]  [address]  [place] [address]  Phone: [phone] Fax: [phone]    Primary Care Provider: Rudi Garza MD    Primary Insurance: MEDICARE  Secondary Insurance:  FOR LIFE    DISCHARGE DETAILS:    Accepting Facility Name, City & State : [facility], [address] [place] [address] [place] [address]  Receiving Facility/Agency Phone Number: Phone: [phone]  Facility/Agency Fax Number: Fax: [phone]

## 2024-08-06 NOTE — PLAN OF CARE
Problem: Prexisting or High Potential for Compromised Skin Integrity  Goal: Skin integrity is maintained or improved  Description: INTERVENTIONS:  - Identify patients at risk for skin breakdown  - Assess and monitor skin integrity  - Assess and monitor nutrition and hydration status  - Monitor labs   - Assess for incontinence   - Turn and reposition patient  - Assist with mobility/ambulation  - Relieve pressure over bony prominences  - Avoid friction and shearing  - Provide appropriate hygiene as needed including keeping skin clean and dry  - Evaluate need for skin moisturizer/barrier cream  - Collaborate with interdisciplinary team   - Patient/family teaching  - Consider wound care consult   Outcome: Progressing     Problem: Nutrition/Hydration-ADULT  Goal: Nutrient/Hydration intake appropriate for improving, restoring or maintaining nutritional needs  Description: Monitor and assess patient's nutrition/hydration status for malnutrition. Collaborate with interdisciplinary team and initiate plan and interventions as ordered.  Monitor patient's weight and dietary intake as ordered or per policy. Utilize nutrition screening tool and intervene as necessary. Determine patient's food preferences and provide high-protein, high-caloric foods as appropriate.     INTERVENTIONS:  - Monitor oral intake, urinary output, labs, and treatment plans  - Assess nutrition and hydration status and recommend course of action  - Evaluate amount of meals eaten  - Assist patient with eating if necessary   - Allow adequate time for meals  - Recommend/ encourage appropriate diets, oral nutritional supplements, and vitamin/mineral supplements  - Order, calculate, and assess calorie counts as needed  - Recommend, monitor, and adjust tube feedings and TPN/PPN based on assessed needs  - Assess need for intravenous fluids  - Provide specific nutrition/hydration education as appropriate  - Include patient/family/caregiver in decisions related to  nutrition  Outcome: Progressing     Problem: MUSCULOSKELETAL - ADULT  Goal: Maintain or return mobility to safest level of function  Description: INTERVENTIONS:  - Assess patient's ability to carry out ADLs; assess patient's baseline for ADL function and identify physical deficits which impact ability to perform ADLs (bathing, care of mouth/teeth, toileting, grooming, dressing, etc.)  - Assess/evaluate cause of self-care deficits   - Assess range of motion  - Assess patient's mobility  - Assess patient's need for assistive devices and provide as appropriate  - Encourage maximum independence but intervene and supervise when necessary  - Involve family in performance of ADLs  - Assess for home care needs following discharge   - Consider OT consult to assist with ADL evaluation and planning for discharge  - Provide patient education as appropriate  Outcome: Progressing  Goal: Maintain proper alignment of affected body part  Description: INTERVENTIONS:  - Support, maintain and protect limb and body alignment  - Provide patient/ family with appropriate education  Outcome: Progressing

## 2024-08-06 NOTE — DISCHARGE INSTR - OTHER ORDERS
Skin care Plan:  1-Left shoulder, left back wounds- Cleanse  with soap and water. Apply Silicone bordered foam dressing over wound beds. Nixon with T for treatment. Change every three days and PRN.   2-Turn/reposition q2h for pressure re-distribution on skin .  3-Elevate heels to offload pressure  4-Moisturize skin daily with skin nourishing cream  5-Ehob cushion in chair when out of bed.  6-Hydraguard to bilateral heels BID and PRN.   7-Right buttock- Cleanse wound with soap and water, pat dry. Apply thin layer of Calazime over wound bed 3 times a day and as needed for mark-care.   no

## 2024-08-06 NOTE — DISCHARGE SUMMARY
[facility]  Discharge- Antonio Duncan 1940, 84 y.o. male MRN: [id number]  Unit/Bed#: -01 Encounter: [id number]  Primary Care Provider: Rudi Garza MD   Date and time admitted to hospital: 8/1/2024 10:55 PM    Ambulatory dysfunction  Assessment & Plan  Chronic, present on hospitalization  Per patient family report, patient unable to tolerate ambulation with L lower extremity on PT evaluation  Per PT recs, will continue to see pt during current hospitalization to address deficits and provide interventions  Patient reports LLE pain in the L foot with ambulation, tenderness present on posterior medial malleolus  L foot x-ray: No acute osseous abnormality  Continue PT interventions and appreciate their recommendations      Fever  Assessment & Plan  The patient did have some fever on 8/2 at night, temperature was as high as 102  Noted left hand arthritis the patient does not have any other localizing signs of infection although he did have an ultrasound that was done which did reveal some cholelithiasis  with some potential gallbladder wall thickening, the patient denies any right upper quadrant pain however.  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  Prominence of the ventricles out of proportion for volume loss raising suspicion for possible normal pressure hydrocephalus.  Patient alert and oriented without urinary incontinence, low clinical suspicion for NPH  However, MRI with poor quality but also potential ventricular dilation - patient would benefit from outpatient follow-up in the NPH clinic       Elevated liver enzymes  Assessment & Plan  Total bilirubin remains elevated  but alk phos normal, no right upper quadrant pain.  RUQ US demonstrated numerous gallstones with gallbladder wall thickening.   Negative Raymond on exam  Patient with previous fever Tmax of 102 F on 8/2  Intermittent to low suspicion for acute cholecystitis     HIDA scan negative       Altered mental status  Assessment & Plan  Documented history of some confusion at home as per family.  The patient could have had acute metabolic encephalopathy given his poor cognitive reserve and arthritis with prior fever.  Neurological exam nonfocal despite CT and MRI findings.  Alert and Oriented to person, place, and month/year     Continue to monitor    Pain and swelling of left upper extremity  Assessment & Plan  X-ray left upper extremities with no acute findings  Inflammatory markers elevated  Patient does have a history of arthritis, unclear type, at some point someone question rheumatoid -he does have a positive rheumatoid factor so suspect rheumatoid arthritis.  Erythema, edema, and pain markedly improved since initial presentation    Continue Voltaren gel for now as it seems to be working well  Also on a course of prednisone    Atrial fibrillation (HCC)  Assessment & Plan  Rate controlled  Continue Cardizem to 40 mg daily  Patient is on warfarin 2.5 mg daily  Daily INR, last was 2.8    CHF (congestive heart failure) (HCC)  Assessment & Plan  Wt Readings from Last 3 Encounters:   08/06/24 113 kg (248 lb 7.3 oz)   08/21/17 117 kg (257 lb 4 oz)   04/21/17 112 kg (247 lb 2 oz)     Patient appears to have slightly worsening edema on admission.  Suspicion for acute on chronic heart failure with preserved ejection fraction earlier during hospitalization    Now feels much better  Stopped IV Lasix, resumed home Lasix 40 mg p.o. daily      Chronic acquired lymphedema  Assessment & Plan  Swelling in L hand now resolved with normal ROM      * Generalized weakness  Assessment & Plan  Likely secondary to acute illness (acute  cholecystitis vs. arthritis flare vs. deconditioning)    PT OT recommends rehab with family and patient agrees        Medical Problems       Resolved Problems  Date Reviewed: 8/2/2024   None       Discharging Physician / Practitioner: Aneudy Zhu MD  PCP: Rudi Garza MD  Admission Date:   Admission Orders (From admission, onward)       Ordered        08/02/24 0133  INPATIENT ADMISSION  Once                          Discharge Date: 08/06/24    Consultations During Hospital Stay:  Neurology     Procedures Performed:   none    Significant Findings / Test Results:   none    Test Results Pending at Discharge (will require follow up):   none     Outpatient Tests Requested:  none    Complications:  none    Reason for Admission: Cognitive dysfunction/altered mental status, generalized weakness    Hospital Course:   Antonio Duncan is a 84 y.o. male patient who originally presented to the hospital on 8/1/2024 due to altered mental status.  This was thought to be related to possible worsening cognitive decline.  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  He was also found to have arthritis flare for which she was started on colchicine and prednisone.  This did resolve however he continued to have amatory dysfunction for which she was discharged to rehab.  We also recommended geriatrics follow-up after discharge.  Discharge instructions were reviewed with the patient as well as his son-in-law at bedside.  Mental status did return to baseline.         Please see above list of diagnoses and related plan for additional information.     Condition at Discharge: fair    Discharge Day Visit / Exam:   * Please refer to separate progress note for these details *    Discussion with Family: Updated  (son in law) at bedside.    Discharge instructions/Information to patient and family:   See after visit summary for information provided  to patient and family.      Provisions for Follow-Up Care:  See after visit summary for information related to follow-up care and any pertinent home health orders.      Mobility at time of Discharge:   Basic Mobility Inpatient Raw Score: 9  JH-HLM Goal: 3: Sit at edge of bed  JH-HLM Achieved: 5: Stand (1 or more minutes)  HLM Goal achieved. Continue to encourage appropriate mobility.     Disposition:   Other Skilled Nursing Facility at [facility] notes    Planned Readmission: none     Discharge Statement:  I spent 45 minutes discharging the patient. This time was spent on the day of discharge. I had direct contact with the patient on the day of discharge. Greater than 50% of the total time was spent examining patient, answering all patient questions, arranging and discussing plan of care with patient as well as directly providing post-discharge instructions.  Additional time then spent on discharge activities.    Discharge Medications:  See after visit summary for reconciled discharge medications provided to patient and/or family.      **Please Note: This note may have been constructed using a voice recognition system**

## 2024-08-06 NOTE — ASSESSMENT & PLAN NOTE
Wt Readings from Last 3 Encounters:   08/06/24 113 kg (248 lb 7.3 oz)   08/21/17 117 kg (257 lb 4 oz)   04/21/17 112 kg (247 lb 2 oz)     Patient appears to have slightly worsening edema on admission.  Suspicion for acute on chronic heart failure with preserved ejection fraction earlier during hospitalization    Now feels much better  Stopped IV Lasix, resumed home Lasix 40 mg p.o. daily

## 2024-08-06 NOTE — DISCHARGE INSTR - AVS FIRST PAGE
Please continue medications as prescribed  Please call for follow-up with geriatrics provider for follow up of cognitive decline  Please call for follow up with Neurology for evaluation for NPH

## 2024-08-07 ENCOUNTER — TELEPHONE (OUTPATIENT)
Age: 84
End: 2024-08-07

## 2024-08-07 LAB
B MICROTI IGG TITR SER: NORMAL {TITER}
B MICROTI IGM TITR SER: NORMAL {TITER}
RESULT/COMMENT: NORMAL

## 2024-08-07 NOTE — TELEPHONE ENCOUNTER
08/07/24    Bart Meyer, I need your assistance.    Patient haves to f/u with Neurology “at NPH clinic” Per DC and With Doctor Chester in 1 month.    Reviewing the Consult Note by Neurology 08/02/24,  “Reason for Consult / Principal Problem: Questionable hallucinations and altered mental status”     Can you please assist the reason for visit so I can better help   Miss. Frazier / Patient .    Please and Thank You       Miss Bermanelle Contact Number: [phone]        U / RHIANNON LOWRY     ID - ([facility]) - 08/06/24

## 2024-08-07 NOTE — TELEPHONE ENCOUNTER
08/07/24    Called Miss. Frazier / Patient  from [facility].    No Answer.    Left Message.      If Miss. Frazier contact office, please assist with scheduling patient HFU.  HFU / [facility] ESSENCE / NPH / MEDICARE A AND B, 2ND  FOR LIFE    Per Miss. Meyer recommendations “ general neurology attending in 6-8 weeks”      Please and Thank You.

## 2024-08-08 LAB
A PHAGOCYTOPH DNA BLD QL NAA+PROBE: NEGATIVE
A PHAGOCYTOPH IGG TITR SER IF: NEGATIVE {TITER}
A PHAGOCYTOPH IGM TITR SER IF: NEGATIVE {TITER}
BACTERIA BLD CULT: NORMAL
BACTERIA BLD CULT: NORMAL
E CHAFFEENSIS IGG TITR SER IF: NEGATIVE {TITER}
E CHAFFEENSIS IGM TITR SER IF: NEGATIVE {TITER}
RESULT/COMMENT: NORMAL

## 2024-08-08 NOTE — TELEPHONE ENCOUNTER
08/08/24      2ND ATTEMPT:     Called Miss. Bermannielsnarinder / Patient  from [facility].     No Answer.     Left Message.        If Miss. Frazier contact office, please assist with scheduling patient HFU.  HFU / SL LOWRY / NPH / MEDICARE A AND B, 2ND  FOR LIFE     Per Miss. Meyer recommendations “ general neurology attending in 6-8 weeks”        Please and Thank You

## 2024-08-15 NOTE — TELEPHONE ENCOUNTER
Spoke to Yasmeen at [facility] to make HFU for pt.     HFU scheduled for 11/20 @ 2:30 with Dr. Briggs in [place].     Please assist with moving appt to recommended time frame.     Thank you.    Please call Yasmeen at [phone] ext [phone]

## 2024-09-02 PROBLEM — R50.9 FEVER: Status: RESOLVED | Noted: 2024-08-03 | Resolved: 2024-09-02

## 2024-10-12 ENCOUNTER — HOSPITAL ENCOUNTER (EMERGENCY)
Facility: HOSPITAL | Age: 84
Discharge: HOME | End: 2024-10-12
Attending: EMERGENCY MEDICINE
Payer: MEDICARE

## 2024-10-12 ENCOUNTER — APPOINTMENT (EMERGENCY)
Dept: CT IMAGING | Facility: HOSPITAL | Age: 84
End: 2024-10-12
Payer: MEDICARE

## 2024-10-12 VITALS
SYSTOLIC BLOOD PRESSURE: 146 MMHG | HEIGHT: 70 IN | BODY MASS INDEX: 35.5 KG/M2 | DIASTOLIC BLOOD PRESSURE: 72 MMHG | TEMPERATURE: 98.5 F | RESPIRATION RATE: 12 BRPM | WEIGHT: 248 LBS | OXYGEN SATURATION: 100 % | HEART RATE: 106 BPM

## 2024-10-12 DIAGNOSIS — Z79.01 ANTICOAGULANT LONG-TERM USE: ICD-10-CM
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
## 2024-10-12 LAB
ATRIAL RATE: 87 BPM
QRS AXIS: 65 DEGREES
QRSD INTERVAL: 108 MS
QT INTERVAL: 352 MS
QTC INTERVAL: 432 MS
T WAVE AXIS: -54 DEGREES
VENTRICULAR RATE: 91 BPM

## 2024-10-12 PROCEDURE — 70450 CT HEAD/BRAIN W/O DYE: CPT

## 2024-10-12 PROCEDURE — 93010 ELECTROCARDIOGRAM REPORT: CPT | Performed by: INTERNAL MEDICINE

## 2024-10-12 PROCEDURE — 93005 ELECTROCARDIOGRAM TRACING: CPT

## 2024-10-12 PROCEDURE — 99284 EMERGENCY DEPT VISIT MOD MDM: CPT | Performed by: EMERGENCY MEDICINE

## 2024-10-12 PROCEDURE — 99284 EMERGENCY DEPT VISIT MOD MDM: CPT

## 2024-10-12 NOTE — ED PROVIDER NOTES
"Time reflects when diagnosis was documented in both MDM as applicable and the Disposition within this note       Time User Action Codes Description Comment    10/12/2024  8:37 AM Marley Roy [W19.XXXA] Fall, initial encounter     10/12/2024  8:37 AM Marley Roy [Z79.01] Anticoagulant long-term use           ED Disposition       ED Disposition   Discharge    Condition   Stable    Date/Time   Sat Oct 12, 2024  8:37 AM    Comment   Antonio Duncan discharge to home/self care.                   Assessment & Plan   {Hyperlinks  Risk Stratification - NIHSS - HEART SCORE - Fill out sepsis note and make sure you call [id number] if severe or septic shock:[id number]}    Medical Decision Making  Amount and/or Complexity of Data Reviewed  Radiology: ordered.        ED Course as of 10/12/24 1542   Sat Oct 12, 2024   0836 Procedure Note: EKG  Date/Time: 10/12/24 8:36 AM   Interpreted by: Marley Roy D.O.  Indications / Diagnosis: fall, possible dizziness  ECG reviewed by me, the ED Provider: yes   The EKG demonstrates:  Rhythm: a-fib  Intervals: normal intervals  Axis: normal axis  QRS/Blocks: normal QRS  ST Changes: No STD/JENNIFER. T wave inversions in II, III, aVF, V3-6. No previous EKG available for comparison.        Medications - No data to display    ED Risk Strat Scores                             History of Present Illness   {Hyperlinks  History (Med, Surg, Fam, Social) - Current Medications - Allergies  :[id number]}    Chief Complaint   Patient presents with   • Fall     From [facility] home, was found on the floor by staff. Patient states that he slipped out of bed and did not fall. Denies hitting head or losing consciousness. \"I just slid down the edge of the bed while I was sleeping flat on my bottom.\" On coumadin.        No past medical history on file.   No past surgical history on file.   No family history on file.   Social History     Tobacco Use   • Smoking status: Unknown   Substance Use Topics   • Alcohol " use: Not Currently   • Drug use: Not Currently      E-Cigarette/Vaping      E-Cigarette/Vaping Substances      I have reviewed and agree with the history as documented.     84 year old male with a past medical history of a-fib on warfarin, CHF, and presumed NPH who presents via EMS for evaluation after a fall.       Review of Systems   Respiratory:  Negative for shortness of breath.    Cardiovascular:  Negative for chest pain.   Gastrointestinal:  Negative for abdominal pain, nausea and vomiting.   Musculoskeletal:  Negative for back pain and neck pain.   Neurological:  Negative for headaches.   All other systems reviewed and are negative.          Objective   {Hyperlinks  Historical Vitals - Historical Labs - Chart Review/Microbiology - Last Echo - Code Status  :[id number]}    ED Triage Vitals [10/12/24 0706]   Temperature Pulse Blood Pressure Respirations SpO2 Patient Position - Orthostatic VS   98.5 °F (36.9 °C) 98 124/80 16 94 % Sitting      Temp Source Heart Rate Source BP Location FiO2 (%) Pain Score    Oral Monitor Right arm -- No Pain      Vitals      Date and Time Temp Pulse SpO2 Resp BP Pain Score FACES Pain Rating User   10/12/24 1211 -- 106 100 % 12 146/72 -- -- RJ   10/12/24 0706 98.5 °F (36.9 °C) -- -- -- -- -- -- RD   10/12/24 0706 -- 98 94 % 16 124/80 No Pain -- NL            Physical Exam  Vitals and nursing note reviewed.   Constitutional:       General: He is awake. He is not in acute distress.     Appearance: He is not toxic-appearing.   HENT:      Head: Normocephalic and atraumatic.   Eyes:      General: Vision grossly intact. Gaze aligned appropriately.   Cardiovascular:      Rate and Rhythm: Normal rate and regular rhythm.      Heart sounds: Normal heart sounds.   Pulmonary:      Effort: Pulmonary effort is normal. No respiratory distress.      Breath sounds: Normal breath sounds.   Chest:      Chest wall: No deformity or tenderness.   Abdominal:      Palpations: Abdomen is soft.       Tenderness: There is no abdominal tenderness.   Musculoskeletal:      Cervical back: Full passive range of motion without pain and neck supple.      Thoracic back: No bony tenderness.      Lumbar back: No bony tenderness.      Right hip: No tenderness.      Left hip: No tenderness.   Skin:     General: Skin is warm and dry.   Neurological:      General: No focal deficit present.      Mental Status: He is alert. Mental status is at baseline. He is confused.       Results Reviewed       None            CT head without contrast   Final Interpretation by Wilian Boggs MD (10/12 0809)      1. No acute intracranial hemorrhage, significant mass effect or midline shift.      2. Ventriculomegaly, which is out of proportion to the degree of cerebral atrophy, noting disproportionately enlarged subarachnoid spaces (DESH). Findings may be seen with normal pressure hydrocephalus (NPH). Correlate with clinical symptomatology.      The study was marked in EPIC for immediate notification.                  Workstation performed: [id number]             Procedures    ED Medication and Procedure Management   Prior to Admission Medications   Prescriptions Last Dose Informant Patient Reported? Taking?   Diclofenac Sodium (VOLTAREN) 1 %   No No   Sig: Apply 2 g topically 4 (four) times a day   acetaminophen (TYLENOL) 325 mg tablet   No No   Sig: Take 2 tablets (650 mg total) by mouth every 6 (six) hours as needed for mild pain   atorvastatin (LIPITOR) 20 mg tablet   Yes No   Sig: Take 20 mg by mouth daily   colchicine (COLCRYS) 0.6 mg tablet   No No   Sig: Take 1 tablet (0.6 mg total) by mouth daily for 5 days   diltiazem (CARDIZEM CD) 240 mg 24 hr capsule   Yes No   Sig: Take 240 mg by mouth daily   furosemide (LASIX) 40 mg tablet   Yes No   Sig: Take 40 mg by mouth daily   melatonin 3 mg   No No   Sig: Take 1 tablet (3 mg total) by mouth daily at bedtime as needed (Insomnia)   tamsulosin (FLOMAX) 0.4 mg   Yes No   Sig: Take 0.4 mg by  mouth daily with dinner   warfarin (COUMADIN) 2.5 mg tablet   Yes No   Sig: Take 2.5 mg by mouth daily      Facility-Administered Medications: None     Discharge Medication List as of 10/12/2024  8:53 AM        CONTINUE these medications which have NOT CHANGED    Details   acetaminophen (TYLENOL) 325 mg tablet Take 2 tablets (650 mg total) by mouth every 6 (six) hours as needed for mild pain, Starting Tue 8/6/2024, No Print      atorvastatin (LIPITOR) 20 mg tablet Take 20 mg by mouth daily, Historical Med      colchicine (COLCRYS) 0.6 mg tablet Take 1 tablet (0.6 mg total) by mouth daily for 5 days, Starting Wed 8/7/2024, Until Mon 8/12/2024, No Print      Diclofenac Sodium (VOLTAREN) 1 % Apply 2 g topically 4 (four) times a day, Starting Tue 8/6/2024, No Print      diltiazem (CARDIZEM CD) 240 mg 24 hr capsule Take 240 mg by mouth daily, Historical Med      furosemide (LASIX) 40 mg tablet Take 40 mg by mouth daily, Historical Med      melatonin 3 mg Take 1 tablet (3 mg total) by mouth daily at bedtime as needed (Insomnia), Starting Tue 8/6/2024, No Print      tamsulosin (FLOMAX) 0.4 mg Take 0.4 mg by mouth daily with dinner, Historical Med      warfarin (COUMADIN) 2.5 mg tablet Take 2.5 mg by mouth daily, Historical Med           No discharge procedures on file.  ED SEPSIS DOCUMENTATION   Time reflects when diagnosis was documented in both MDM as applicable and the Disposition within this note       Time User Action Codes Description Comment    10/12/2024  8:37 AM Marley Roy [W19.XXXA] Fall, initial encounter     10/12/2024  8:37 AM Marley Roy [Z79.01] Anticoagulant long-term use

## 2024-11-16 NOTE — ED PROVIDER NOTES
Time reflects when diagnosis was documented in both MDM as applicable and the Disposition within this note       Time User Action Codes Description Comment    10/12/2024  8:37 AM Marley Roy [W19.XXXA] Fall, initial encounter     10/12/2024  8:37 AM Marley Roy [Z79.01] Anticoagulant long-term use           ED Disposition       ED Disposition   Discharge    Condition   Stable    Date/Time   Sat Oct 12, 2024  8:37 AM    Comment   Antonio Duncan discharge to home/self care.                   Assessment & Plan       Medical Decision Making  84-year-old male brought in for evaluation after he was found on the ground at his care home.  Patient states he just slid off of the bed, but he has been having some cognitive decline, not sure how he ended up on the ground.  Patient has no external signs of injury on exam.  He denies any complaints at this time.  Given that the patient is on warfarin, CT scan of the head was obtained, negative for any acute intracranial abnormalities.  Patient's EKG shows A-fib with T wave inversions in inferior/lateral leads.  No EKG available for comparison, but when compared to the interpretation of a previous EKG done at outside hospital, this appears unchanged.  Did discuss the option of performing further workup versus discharge with close follow-up with patient's family member at bedside, at this time, they are comfortable with him going back to his care facility.  Patient discharged in stable condition with symptomatic care instructions and strict ED return precautions.    Amount and/or Complexity of Data Reviewed  Radiology: ordered.        ED Course as of 11/15/24 1904   Sat Oct 12, 2024   0836 Procedure Note: EKG  Date/Time: 10/12/24 8:36 AM   Interpreted by: Marley Roy D.O.  Indications / Diagnosis: fall, possible dizziness  ECG reviewed by me, the ED Provider: yes   The EKG demonstrates:  Rhythm: a-fib  Intervals: normal intervals  Axis: normal axis  QRS/Blocks: normal QRS  ST  "Changes: No STD/JENNIFER. T wave inversions in II, III, aVF, V3-6. No previous EKG available for comparison.        Medications - No data to display    ED Risk Strat Scores                                               History of Present Illness       Chief Complaint   Patient presents with    Fall     From [facility] personal care home, was found on the floor by staff. Patient states that he slipped out of bed and did not fall. Denies hitting head or losing consciousness. \"I just slid down the edge of the bed while I was sleeping flat on my bottom.\" On coumadin.        No past medical history on file.   No past surgical history on file.   No family history on file.   Social History     Tobacco Use    Smoking status: Unknown   Substance Use Topics    Alcohol use: Not Currently    Drug use: Not Currently      E-Cigarette/Vaping      E-Cigarette/Vaping Substances      I have reviewed and agree with the history as documented.     84-year-old male with a past medical history of TERESO, hypertension, hyperlipidemia, and A-fib on warfarin presents for evaluation after he was found on the floor next to his bed by staff at his personal care home.  Patient states that he slid out of the bed and landed on his buttocks on the ground.  Patient denies hitting his head or any loss of consciousness.  He denies any chest pain, shortness of breath, abdominal pain, nausea, vomiting, or other concerning symptoms.    Of note, patient was admitted 2 months ago for ambulatory dysfunction and generalized weakness.  Patient was evaluated for possible normal pressure hydrocephalus at that time.  He was discharged to a rehab facility afterwards.  Was send concerns for worsening cognitive decline at that time as well.  Patient's family member arrived at bedside, states that the staff found him near the bathroom today.  They are not sure how he got there, they believe he may have fallen while trying to go to the bathroom.  Family states that the patient " seems to be in his normal state of health and at his baseline mental status currently.  No other reported concerns at this time.        Review of Systems   Respiratory:  Negative for shortness of breath.    Cardiovascular:  Negative for chest pain.   Gastrointestinal:  Negative for abdominal pain and vomiting.   Musculoskeletal:  Negative for arthralgias and neck pain.   Neurological:  Negative for headaches.   All other systems reviewed and are negative.          Objective       ED Triage Vitals [10/12/24 0706]   Temperature Pulse Blood Pressure Respirations SpO2 Patient Position - Orthostatic VS   98.5 °F (36.9 °C) 98 124/80 16 94 % Sitting      Temp Source Heart Rate Source BP Location FiO2 (%) Pain Score    Oral Monitor Right arm -- No Pain      Vitals      Date and Time Temp Pulse SpO2 Resp BP Pain Score FACES Pain Rating User   10/12/24 1211 -- 106 100 % 12 146/72 -- -- RJ   10/12/24 0706 98.5 °F (36.9 °C) -- -- -- -- -- -- RD   10/12/24 0706 -- 98 94 % 16 124/80 No Pain -- NL            Physical Exam  Vitals and nursing note reviewed.   Constitutional:       General: He is awake. He is not in acute distress.     Appearance: He is not toxic-appearing.   HENT:      Head: Normocephalic and atraumatic. No abrasion or contusion.   Eyes:      General: Vision grossly intact. Gaze aligned appropriately.   Cardiovascular:      Rate and Rhythm: Normal rate. Rhythm irregular.      Heart sounds: Normal heart sounds.   Pulmonary:      Effort: Pulmonary effort is normal. No respiratory distress.      Breath sounds: Normal breath sounds.   Abdominal:      Palpations: Abdomen is soft.      Tenderness: There is no abdominal tenderness.   Musculoskeletal:      Cervical back: Full passive range of motion without pain and neck supple.      Comments: No MSK deformities or tenderness.    Skin:     General: Skin is warm and dry.      Comments: No obvious bruising or hematomas noted.    Neurological:      General: No focal deficit  present.      Mental Status: He is alert. Mental status is at baseline.         Results Reviewed       None            CT head without contrast   Final Interpretation by Wilian Boggs MD (10/12 0809)      1. No acute intracranial hemorrhage, significant mass effect or midline shift.      2. Ventriculomegaly, which is out of proportion to the degree of cerebral atrophy, noting disproportionately enlarged subarachnoid spaces (DESH). Findings may be seen with normal pressure hydrocephalus (NPH). Correlate with clinical symptomatology.      The study was marked in EPIC for immediate notification.                  Workstation performed: [id number]             Procedures    ED Medication and Procedure Management   Prior to Admission Medications   Prescriptions Last Dose Informant Patient Reported? Taking?   Diclofenac Sodium (VOLTAREN) 1 %   No No   Sig: Apply 2 g topically 4 (four) times a day   acetaminophen (TYLENOL) 325 mg tablet   No No   Sig: Take 2 tablets (650 mg total) by mouth every 6 (six) hours as needed for mild pain   atorvastatin (LIPITOR) 20 mg tablet   Yes No   Sig: Take 20 mg by mouth daily   colchicine (COLCRYS) 0.6 mg tablet   No No   Sig: Take 1 tablet (0.6 mg total) by mouth daily for 5 days   diltiazem (CARDIZEM CD) 240 mg 24 hr capsule   Yes No   Sig: Take 240 mg by mouth daily   furosemide (LASIX) 40 mg tablet   Yes No   Sig: Take 40 mg by mouth daily   melatonin 3 mg   No No   Sig: Take 1 tablet (3 mg total) by mouth daily at bedtime as needed (Insomnia)   tamsulosin (FLOMAX) 0.4 mg   Yes No   Sig: Take 0.4 mg by mouth daily with dinner   warfarin (COUMADIN) 2.5 mg tablet   Yes No   Sig: Take 2.5 mg by mouth daily      Facility-Administered Medications: None     Discharge Medication List as of 10/12/2024  8:53 AM        CONTINUE these medications which have NOT CHANGED    Details   acetaminophen (TYLENOL) 325 mg tablet Take 2 tablets (650 mg total) by mouth every 6 (six) hours as needed for  mild pain, Starting Tue 8/6/2024, No Print      atorvastatin (LIPITOR) 20 mg tablet Take 20 mg by mouth daily, Historical Med      colchicine (COLCRYS) 0.6 mg tablet Take 1 tablet (0.6 mg total) by mouth daily for 5 days, Starting Wed 8/7/2024, Until Mon 8/12/2024, No Print      Diclofenac Sodium (VOLTAREN) 1 % Apply 2 g topically 4 (four) times a day, Starting Tue 8/6/2024, No Print      diltiazem (CARDIZEM CD) 240 mg 24 hr capsule Take 240 mg by mouth daily, Historical Med      furosemide (LASIX) 40 mg tablet Take 40 mg by mouth daily, Historical Med      melatonin 3 mg Take 1 tablet (3 mg total) by mouth daily at bedtime as needed (Insomnia), Starting Tue 8/6/2024, No Print      tamsulosin (FLOMAX) 0.4 mg Take 0.4 mg by mouth daily with dinner, Historical Med      warfarin (COUMADIN) 2.5 mg tablet Take 2.5 mg by mouth daily, Historical Med           No discharge procedures on file.  ED SEPSIS DOCUMENTATION   Time reflects when diagnosis was documented in both MDM as applicable and the Disposition within this note       Time User Action Codes Description Comment    10/12/2024  8:37 AM Marley Roy [W19.XXXA] Fall, initial encounter     10/12/2024  8:37 AM Marley Roy [Z79.01] Anticoagulant long-term use                  Marley Roy,   11/15/24 2044

## 2024-11-20 ENCOUNTER — OFFICE VISIT (OUTPATIENT)
Dept: NEUROLOGY | Facility: CLINIC | Age: 84
End: 2024-11-20
Payer: MEDICARE

## 2024-11-20 VITALS — DIASTOLIC BLOOD PRESSURE: 70 MMHG | SYSTOLIC BLOOD PRESSURE: 130 MMHG | HEART RATE: 68 BPM

## 2024-11-20 DIAGNOSIS — G91.2 NPH (NORMAL PRESSURE HYDROCEPHALUS) (HCC): ICD-10-CM

## 2024-11-20 DIAGNOSIS — R41.89 COGNITIVE DECLINE: Primary | ICD-10-CM

## 2024-11-20 DIAGNOSIS — F03.92 DEMENTIA WITH PSYCHOTIC DISTURBANCE, UNSPECIFIED DEMENTIA SEVERITY, UNSPECIFIED DEMENTIA TYPE (HCC): ICD-10-CM

## 2024-11-20 PROCEDURE — 99215 OFFICE O/P EST HI 40 MIN: CPT | Performed by: STUDENT IN AN ORGANIZED HEALTH CARE EDUCATION/TRAINING PROGRAM

## 2024-11-20 RX ORDER — LORAZEPAM 1 MG/1
1 TABLET ORAL ONCE
Qty: 1 TABLET | Refills: 0 | Status: SHIPPED | OUTPATIENT
Start: 2024-11-20 | End: 2024-12-01

## 2024-11-20 NOTE — PROGRESS NOTES
Name: Antonio Duncan      : 1940      MRN: [id number]  Encounter Provider: Debbie Briggs MD  Encounter Date: 2024   Encounter department: [facility]    :  Assessment & Plan  Cognitive decline  -MOCA unable to be completed today. MRI brain w/wo previously unable to be completed, but CTH did show findings c/f NPH on CTH. He was evaluated by neurology while admitted, and suspicion at that time was that his symptoms may have been representative of NPH, early cognitive dysfunction, or a combination of both, which I agree with. Recommend MRI brain neuroquant for further characterization and evaluation for neurodegenerative changes. Will send memory labs. I also recommend audiology evaluation for consideration of hearing aids. Advise delirium precautions, including limiting sedating medications, keeping a consistent daily sleep-wake schedule limiting daytime naps, consideration of melatonin qHS  Orders:    MRI brain NeuroQuant wo and w contrast; Future    LORazepam (ATIVAN) 1 mg tablet; Take 1 tablet (1 mg total) by mouth 1 (one) time for 1 dose    melatonin 3 mg; Take 1 tablet (3 mg total) by mouth daily at bedtime    Vitamin B12/Folate, Serum Panel; Future    Comprehensive metabolic panel; Future    TSH + Free T4; Future    NPH (normal pressure hydrocephalus) (HCC)  MRI previously unable to be completed. Recommend attempt at re-evaluation to better characterize possible NPH on CTH. Recommend further evaluation with NPH clinic  Orders:    Ambulatory referral to Neurosurgery; Future    MRI brain NeuroQuant wo and w contrast; Future      CC:   HFU cognitive decline/gait disturbance    History of Present Illness:     85 yo M PMHx CHF, afib, HTN, TERESO who presents for evaluation of cognitive decline and gait disturbance.     Pt is accompanied by his stepson today. States that back on  pt was noted not to be answering his phone and was found down after he had fallen across 2  dining room chairs. CTH at that time was c/f NPH, and he was unable to have MRI brain performed. Collin states that prior to this he did not have any difficulties living alone. He was previously completely independent and driving. Endorsed urge incontinence at times. He was evaluated by neurology while admitted, and suspicion at that time was that his symptoms may have been representative of NPH, early cognitive dysfunction, or a combination of both.     Collin states that he was discharged to SNF and has been sundowning at the facility. States that he does not sleep through the night currently. Pt also endorses hearing loss.     Past Medical History:   No past medical history on file.    Patient Active Problem List   Diagnosis    Generalized weakness    Chronic acquired lymphedema    CHF (congestive heart failure) (HCC)    Atrial fibrillation (HCC)    Pain and swelling of left upper extremity    Altered mental status    Elevated liver enzymes    Radiographic suspicion normal pressure hydrocephalus on CT head    Ambulatory dysfunction       Medications:      Current Outpatient Medications   Medication Sig Dispense Refill    acetaminophen (TYLENOL) 325 mg tablet Take 2 tablets (650 mg total) by mouth every 6 (six) hours as needed for mild pain      atorvastatin (LIPITOR) 20 mg tablet Take 20 mg by mouth daily      colchicine (COLCRYS) 0.6 mg tablet Take 1 tablet (0.6 mg total) by mouth daily for 5 days      Diclofenac Sodium (VOLTAREN) 1 % Apply 2 g topically 4 (four) times a day      diltiazem (CARDIZEM CD) 240 mg 24 hr capsule Take 240 mg by mouth daily      furosemide (LASIX) 40 mg tablet Take 40 mg by mouth daily      melatonin 3 mg Take 1 tablet (3 mg total) by mouth daily at bedtime as needed (Insomnia)      tamsulosin (FLOMAX) 0.4 mg Take 0.4 mg by mouth daily with dinner      warfarin (COUMADIN) 2.5 mg tablet Take 2.5 mg by mouth daily       No current facility-administered medications for this visit.         Allergies:      Allergies   Allergen Reactions    Sulfa Antibiotics Rash       Family History:     No family history on file.    Social History:       Social History     Socioeconomic History    Marital status:      Spouse name: Not on file    Number of children: Not on file    Years of education: Not on file    Highest education level: Not on file   Occupational History    Not on file   Tobacco Use    Smoking status: Unknown    Smokeless tobacco: Not on file   Substance and Sexual Activity    Alcohol use: Not Currently    Drug use: Not Currently    Sexual activity: Not Currently   Other Topics Concern    Not on file   Social History Narrative    Not on file     Social Drivers of Health     Financial Resource Strain: Not on file   Food Insecurity: No Food Insecurity (8/2/2024)    Nursing - Inadequate Food Risk Classification     Worried About Running Out of Food in the Last Year: Never true     Ran Out of Food in the Last Year: Never true     Ran Out of Food in the Last Year: Not on file   Transportation Needs: No Transportation Needs (8/2/2024)    PRAPARE - Transportation     Lack of Transportation (Medical): No     Lack of Transportation (Non-Medical): No   Physical Activity: Not on file   Stress: Not on file   Social Connections: Unknown (6/18/2024)    Received from [employer]    Social [email or web address]     How often do you feel lonely or isolated from those around you? (Adult - for ages 18 years and over): Not on file   Intimate Partner Violence: Not on file   Housing Stability: Low Risk  (8/2/2024)    Housing Stability Vital Sign     Unable to Pay for Housing in the Last Year: No     Number of Times Moved in the Last Year: 0     Homeless in the Last Year: No         Objective:   /70 (BP Location: Right arm, Patient Position: Sitting, Cuff Size: Large)   Pulse 68     General: Patient is not in any acute/apparent distress, well nourished, well developed and cooperative.   HEENT: normocephalic,  atraumatic, moist membranes  Neck: supple  Extremities: no edema noted   Skin: no lesions or rash  Musculosketal: no bony abnormalities    Neurologic Examination:   Mental status: alert, awake, oriented X 3 and following commands.     Speech/Language: Speech is fluent without any dysarthria, no aphasia noted, can name, comprehension intact    Cranial Nerves:   CN I: smell not tested  CN II: Visual fields full to confrontation  CN III, IV, VI: Extraocular movements intact bilaterally. Pupils equal round and reactive to light bilaterally.  CN V: Facial sensation is normal.  CN VII: Full and symmetric facial movement.  CN VIII: Hearing is normal.  CN IX, X: Palate elevates symmetrically.   CN XI: Shoulder shrug strength is normal.  CN XII: Tongue midline without atrophy or fasciculations.    Motor:   Strength 5/5 in all 4 extremities. Bulk/tone - normal.  Fasiculations - none    Sensory:   Sensation intact to soft touch, vibration and pinprick in all 4 extremities.    Cerebellar:   Finger-to-nose intact, normal heel to shin.    Reflexes: 2+ in all 4 extremities  Pathologic reflexes - babinski reflex negative, Hoffmans reflex - negative    Gait:   Antalgic gait       Review of Systems:       Review of Systems   Constitutional:  Negative for appetite change, fatigue and fever.   HENT: Negative.  Negative for hearing loss, tinnitus, trouble swallowing and voice change.    Eyes: Negative.  Negative for photophobia, pain and visual disturbance.   Respiratory: Negative.  Negative for shortness of breath.    Cardiovascular: Negative.  Negative for palpitations.   Gastrointestinal: Negative.  Negative for nausea and vomiting.   Endocrine: Negative.  Negative for cold intolerance.   Genitourinary: Negative.  Negative for dysuria, frequency and urgency.   Musculoskeletal:  Negative for back pain, gait problem, myalgias, neck pain and neck stiffness.   Skin: Negative.  Negative for rash.   Allergic/Immunologic: Negative.     Neurological: Negative.  Negative for dizziness, tremors, seizures, syncope, facial asymmetry, speech difficulty, weakness, light-headedness, numbness and headaches.   Hematological: Negative.  Does not bruise/bleed easily.   Psychiatric/Behavioral:  Positive for confusion. Negative for hallucinations and sleep disturbance.         Patient states his memory come and goes. Patient isn't quite sure how to describe it.      I have personally reviewed the MA's review of systems and made changes as necessary.    I have spent a total time of 60 minutes in caring for this patient on the day of the visit/encounter including Risks and benefits of tx options, Instructions for management, Patient and family education, Risk factor reductions, Impressions, Documenting in the medical record, Reviewing / ordering tests, medicine, procedures  , and Obtaining or reviewing history  .

## 2024-11-26 ENCOUNTER — TELEPHONE (OUTPATIENT)
Dept: NEUROSURGERY | Facility: CLINIC | Age: 84
End: 2024-11-26

## 2024-11-26 NOTE — TELEPHONE ENCOUNTER
Received a call from patient's daughter in law Ketty stating the upcoming appt is listed as a procedure in [facility] and she is looking to confirm what will be going on. Advised the appt is simply a consult related to NPH and there will not be any procedure that day.     She stated an understanding and was appreciative of the call.

## 2024-11-27 ENCOUNTER — APPOINTMENT (EMERGENCY)
Dept: CT IMAGING | Facility: HOSPITAL | Age: 84
DRG: 057 | End: 2024-11-27
Payer: MEDICARE

## 2024-11-27 ENCOUNTER — HOSPITAL ENCOUNTER (INPATIENT)
Facility: HOSPITAL | Age: 84
LOS: 3 days | Discharge: NURSING, REHAB OR LONG-TERM CARE | DRG: 057 | End: 2024-12-01
Attending: STUDENT IN AN ORGANIZED HEALTH CARE EDUCATION/TRAINING PROGRAM | Admitting: INTERNAL MEDICINE
Payer: MEDICARE

## 2024-11-27 ENCOUNTER — APPOINTMENT (EMERGENCY)
Dept: RADIOLOGY | Facility: HOSPITAL | Age: 84
DRG: 057 | End: 2024-11-27
Payer: MEDICARE

## 2024-11-27 DIAGNOSIS — G91.2 NORMAL PRESSURE HYDROCEPHALUS (HCC): ICD-10-CM

## 2024-11-27 DIAGNOSIS — R93.0 ABNORMAL HEAD CT: ICD-10-CM

## 2024-11-27 DIAGNOSIS — R26.2 AMBULATORY DYSFUNCTION: Primary | ICD-10-CM

## 2024-11-27 DIAGNOSIS — R29.6 RECURRENT FALLS: ICD-10-CM

## 2024-11-27 PROBLEM — I71.20 THORACIC AORTIC ANEURYSM WITHOUT RUPTURE (HCC): Status: ACTIVE | Noted: 2024-11-17

## 2024-11-27 PROBLEM — M1A.9XX0 CHRONIC GOUT, UNSPECIFIED, WITHOUT TOPHUS (TOPHI): Status: ACTIVE | Noted: 2024-08-06

## 2024-11-27 PROBLEM — N40.0 BENIGN PROSTATIC HYPERPLASIA WITHOUT URINARY OBSTRUCTION: Status: ACTIVE | Noted: 2024-08-06

## 2024-11-27 PROBLEM — I34.0 MITRAL VALVE INSUFFICIENCY, ACQUIRED: Status: ACTIVE | Noted: 2017-08-21

## 2024-11-27 PROBLEM — H90.3 BILATERAL SENSORINEURAL HEARING LOSS: Status: ACTIVE | Noted: 2024-11-17

## 2024-11-27 PROBLEM — E66.01 MORBID (SEVERE) OBESITY DUE TO EXCESS CALORIES (HCC): Status: ACTIVE | Noted: 2024-08-06

## 2024-11-27 PROBLEM — I10 BENIGN ESSENTIAL HYPERTENSION: Status: ACTIVE | Noted: 2024-11-17

## 2024-11-27 LAB
2HR DELTA HS TROPONIN: 7 NG/L
ALBUMIN SERPL BCG-MCNC: 3.3 G/DL (ref 3.5–5)
ALP SERPL-CCNC: 77 U/L (ref 34–104)
ALT SERPL W P-5'-P-CCNC: 6 U/L (ref 7–52)
ANION GAP SERPL CALCULATED.3IONS-SCNC: 8 MMOL/L (ref 4–13)
AST SERPL W P-5'-P-CCNC: 10 U/L (ref 13–39)
BACTERIA UR QL AUTO: ABNORMAL /HPF
BASOPHILS # BLD AUTO: 0.02 THOUSANDS/ΜL (ref 0–0.1)
BASOPHILS NFR BLD AUTO: 0 % (ref 0–1)
BILIRUB SERPL-MCNC: 2.46 MG/DL (ref 0.2–1)
BILIRUB UR QL STRIP: NEGATIVE
BUN SERPL-MCNC: 24 MG/DL (ref 5–25)
CALCIUM ALBUM COR SERPL-MCNC: 9.4 MG/DL (ref 8.3–10.1)
CALCIUM SERPL-MCNC: 8.8 MG/DL (ref 8.4–10.2)
CARDIAC TROPONIN I PNL SERPL HS: 21 NG/L (ref ?–50)
CARDIAC TROPONIN I PNL SERPL HS: 28 NG/L (ref ?–50)
CHLORIDE SERPL-SCNC: 101 MMOL/L (ref 96–108)
CLARITY UR: CLEAR
CO2 SERPL-SCNC: 28 MMOL/L (ref 21–32)
COLOR UR: YELLOW
CREAT SERPL-MCNC: 1.27 MG/DL (ref 0.6–1.3)
EOSINOPHIL # BLD AUTO: 0 THOUSAND/ΜL (ref 0–0.61)
EOSINOPHIL NFR BLD AUTO: 0 % (ref 0–6)
ERYTHROCYTE [DISTWIDTH] IN BLOOD BY AUTOMATED COUNT: 15.9 % (ref 11.6–15.1)
GFR SERPL CREATININE-BSD FRML MDRD: 51 ML/MIN/1.73SQ M
GLUCOSE SERPL-MCNC: 119 MG/DL (ref 65–140)
GLUCOSE SERPL-MCNC: 130 MG/DL (ref 65–140)
GLUCOSE UR STRIP-MCNC: NEGATIVE MG/DL
HCT VFR BLD AUTO: 36 % (ref 36.5–49.3)
HGB BLD-MCNC: 11.3 G/DL (ref 12–17)
HGB UR QL STRIP.AUTO: ABNORMAL
IMM GRANULOCYTES # BLD AUTO: 0.03 THOUSAND/UL (ref 0–0.2)
IMM GRANULOCYTES NFR BLD AUTO: 0 % (ref 0–2)
KETONES UR STRIP-MCNC: NEGATIVE MG/DL
LEUKOCYTE ESTERASE UR QL STRIP: NEGATIVE
LYMPHOCYTES # BLD AUTO: 0.39 THOUSANDS/ΜL (ref 0.6–4.47)
LYMPHOCYTES NFR BLD AUTO: 6 % (ref 14–44)
MCH RBC QN AUTO: 30.8 PG (ref 26.8–34.3)
MCHC RBC AUTO-ENTMCNC: 31.4 G/DL (ref 31.4–37.4)
MCV RBC AUTO: 98 FL (ref 82–98)
MONOCYTES # BLD AUTO: 0.73 THOUSAND/ΜL (ref 0.17–1.22)
MONOCYTES NFR BLD AUTO: 11 % (ref 4–12)
NEUTROPHILS # BLD AUTO: 5.53 THOUSANDS/ΜL (ref 1.85–7.62)
NEUTS SEG NFR BLD AUTO: 83 % (ref 43–75)
NITRITE UR QL STRIP: NEGATIVE
NON-SQ EPI CELLS URNS QL MICRO: ABNORMAL /HPF
NRBC BLD AUTO-RTO: 0 /100 WBCS
PH UR STRIP.AUTO: 5 [PH]
PLATELET # BLD AUTO: 203 THOUSANDS/UL (ref 149–390)
PMV BLD AUTO: 9.9 FL (ref 8.9–12.7)
POTASSIUM SERPL-SCNC: 3.8 MMOL/L (ref 3.5–5.3)
PROT SERPL-MCNC: 5.9 G/DL (ref 6.4–8.4)
PROT UR STRIP-MCNC: ABNORMAL MG/DL
RBC # BLD AUTO: 3.67 MILLION/UL (ref 3.88–5.62)
RBC #/AREA URNS AUTO: ABNORMAL /HPF
SODIUM SERPL-SCNC: 137 MMOL/L (ref 135–147)
SP GR UR STRIP.AUTO: 1.02 (ref 1–1.03)
UROBILINOGEN UR STRIP-ACNC: <2 MG/DL
WBC # BLD AUTO: 6.7 THOUSAND/UL (ref 4.31–10.16)
WBC #/AREA URNS AUTO: ABNORMAL /HPF

## 2024-11-27 PROCEDURE — 99223 1ST HOSP IP/OBS HIGH 75: CPT | Performed by: INTERNAL MEDICINE

## 2024-11-27 PROCEDURE — 70450 CT HEAD/BRAIN W/O DYE: CPT

## 2024-11-27 PROCEDURE — 81001 URINALYSIS AUTO W/SCOPE: CPT | Performed by: STUDENT IN AN ORGANIZED HEALTH CARE EDUCATION/TRAINING PROGRAM

## 2024-11-27 PROCEDURE — 72131 CT LUMBAR SPINE W/O DYE: CPT

## 2024-11-27 PROCEDURE — 93005 ELECTROCARDIOGRAM TRACING: CPT

## 2024-11-27 PROCEDURE — 99285 EMERGENCY DEPT VISIT HI MDM: CPT | Performed by: STUDENT IN AN ORGANIZED HEALTH CARE EDUCATION/TRAINING PROGRAM

## 2024-11-27 PROCEDURE — 82948 REAGENT STRIP/BLOOD GLUCOSE: CPT

## 2024-11-27 PROCEDURE — 99285 EMERGENCY DEPT VISIT HI MDM: CPT

## 2024-11-27 PROCEDURE — 85025 COMPLETE CBC W/AUTO DIFF WBC: CPT | Performed by: STUDENT IN AN ORGANIZED HEALTH CARE EDUCATION/TRAINING PROGRAM

## 2024-11-27 PROCEDURE — 36415 COLL VENOUS BLD VENIPUNCTURE: CPT | Performed by: STUDENT IN AN ORGANIZED HEALTH CARE EDUCATION/TRAINING PROGRAM

## 2024-11-27 PROCEDURE — 72192 CT PELVIS W/O DYE: CPT

## 2024-11-27 PROCEDURE — 84443 ASSAY THYROID STIM HORMONE: CPT | Performed by: INTERNAL MEDICINE

## 2024-11-27 PROCEDURE — 84484 ASSAY OF TROPONIN QUANT: CPT | Performed by: STUDENT IN AN ORGANIZED HEALTH CARE EDUCATION/TRAINING PROGRAM

## 2024-11-27 PROCEDURE — 71045 X-RAY EXAM CHEST 1 VIEW: CPT

## 2024-11-27 PROCEDURE — 80053 COMPREHEN METABOLIC PANEL: CPT | Performed by: STUDENT IN AN ORGANIZED HEALTH CARE EDUCATION/TRAINING PROGRAM

## 2024-11-28 LAB
4HR DELTA HS TROPONIN: 9 NG/L
ATRIAL RATE: 136 BPM
CARDIAC TROPONIN I PNL SERPL HS: 30 NG/L (ref ?–50)
PLATELET # BLD AUTO: 158 THOUSANDS/UL (ref 149–390)
PMV BLD AUTO: 8.8 FL (ref 8.9–12.7)
QRS AXIS: 83 DEGREES
QRSD INTERVAL: 154 MS
QT INTERVAL: 392 MS
QTC INTERVAL: 505 MS
T WAVE AXIS: 0 DEGREES
TSH SERPL DL<=0.05 MIU/L-ACNC: 0.86 UIU/ML (ref 0.45–4.5)
VENTRICULAR RATE: 100 BPM
VIT B12 SERPL-MCNC: 444 PG/ML (ref 180–914)

## 2024-11-28 PROCEDURE — 97163 PT EVAL HIGH COMPLEX 45 MIN: CPT

## 2024-11-28 PROCEDURE — 99232 SBSQ HOSP IP/OBS MODERATE 35: CPT | Performed by: INTERNAL MEDICINE

## 2024-11-28 PROCEDURE — 99214 OFFICE O/P EST MOD 30 MIN: CPT | Performed by: PSYCHIATRY & NEUROLOGY

## 2024-11-28 PROCEDURE — 84484 ASSAY OF TROPONIN QUANT: CPT | Performed by: STUDENT IN AN ORGANIZED HEALTH CARE EDUCATION/TRAINING PROGRAM

## 2024-11-28 PROCEDURE — 82607 VITAMIN B-12: CPT | Performed by: INTERNAL MEDICINE

## 2024-11-28 PROCEDURE — 93010 ELECTROCARDIOGRAM REPORT: CPT | Performed by: INTERNAL MEDICINE

## 2024-11-28 PROCEDURE — 85049 AUTOMATED PLATELET COUNT: CPT | Performed by: INTERNAL MEDICINE

## 2024-11-28 PROCEDURE — 86780 TREPONEMA PALLIDUM: CPT | Performed by: INTERNAL MEDICINE

## 2024-11-28 RX ORDER — SENNOSIDES 8.6 MG
1 TABLET ORAL DAILY
Status: DISCONTINUED | OUTPATIENT
Start: 2024-11-28 | End: 2024-12-01 | Stop reason: HOSPADM

## 2024-11-28 RX ORDER — BISACODYL 5 MG/1
10 TABLET, DELAYED RELEASE ORAL AS NEEDED
COMMUNITY

## 2024-11-28 RX ORDER — ONDANSETRON 2 MG/ML
4 INJECTION INTRAMUSCULAR; INTRAVENOUS EVERY 6 HOURS PRN
Status: DISCONTINUED | OUTPATIENT
Start: 2024-11-28 | End: 2024-11-28

## 2024-11-28 RX ORDER — COLCHICINE 0.6 MG/1
0.6 TABLET ORAL DAILY
Status: DISCONTINUED | OUTPATIENT
Start: 2024-11-28 | End: 2024-12-01 | Stop reason: HOSPADM

## 2024-11-28 RX ORDER — DILTIAZEM HYDROCHLORIDE 240 MG/1
240 CAPSULE, COATED, EXTENDED RELEASE ORAL DAILY
Status: DISCONTINUED | OUTPATIENT
Start: 2024-11-28 | End: 2024-12-01 | Stop reason: HOSPADM

## 2024-11-28 RX ORDER — FUROSEMIDE 40 MG/1
40 TABLET ORAL DAILY
Status: DISCONTINUED | OUTPATIENT
Start: 2024-11-28 | End: 2024-12-01 | Stop reason: HOSPADM

## 2024-11-28 RX ORDER — ACETAMINOPHEN 160 MG/5ML
650 SUSPENSION ORAL EVERY 6 HOURS PRN
Status: DISCONTINUED | OUTPATIENT
Start: 2024-11-28 | End: 2024-12-01 | Stop reason: HOSPADM

## 2024-11-28 RX ORDER — TAMSULOSIN HYDROCHLORIDE 0.4 MG/1
0.4 CAPSULE ORAL
Status: DISCONTINUED | OUTPATIENT
Start: 2024-11-28 | End: 2024-12-01 | Stop reason: HOSPADM

## 2024-11-28 RX ORDER — HEPARIN SODIUM 5000 [USP'U]/ML
5000 INJECTION, SOLUTION INTRAVENOUS; SUBCUTANEOUS EVERY 8 HOURS SCHEDULED
Status: DISCONTINUED | OUTPATIENT
Start: 2024-11-28 | End: 2024-11-28

## 2024-11-28 RX ADMIN — SENNOSIDES 8.6 MG: 8.6 TABLET, FILM COATED ORAL at 09:25

## 2024-11-28 RX ADMIN — APIXABAN 5 MG: 5 TABLET, FILM COATED ORAL at 14:25

## 2024-11-28 RX ADMIN — HEPARIN SODIUM 5000 UNITS: 5000 INJECTION, SOLUTION INTRAVENOUS; SUBCUTANEOUS at 05:48

## 2024-11-28 RX ADMIN — COLCHICINE 0.6 MG: 0.6 TABLET ORAL at 09:24

## 2024-11-28 RX ADMIN — TAMSULOSIN HYDROCHLORIDE 0.4 MG: 0.4 CAPSULE ORAL at 17:01

## 2024-11-28 RX ADMIN — HEPARIN SODIUM 5000 UNITS: 5000 INJECTION, SOLUTION INTRAVENOUS; SUBCUTANEOUS at 01:44

## 2024-11-28 RX ADMIN — APIXABAN 5 MG: 5 TABLET, FILM COATED ORAL at 20:00

## 2024-11-28 NOTE — CONSULTS
Assesment/plan:  84 year with history of Afib, Hypertension, Hyperlipidemia, CHF, OSAS, prediabetes, and pulmonary hypertension with high BMI. Here with multiple falls, and was diagnosed with NPH few months ago for recurrent falls. Etiology is most likely related to NPH, patient doesn't have a shunt, unsure why? May need neurosurgery consult as well    PLAN:  Recommend Mri brain to look for any structural causes  He needs optimization with therapy, recommend PT and OT   Continue with eliquis for history of afib  Recommend doing infectious workup such as U/A, and CXR.   Neurosurgery to see if  shunt is appropriate for this patient.     Neurology will follow.        PLAN:   Antonio Duncan will need follow up in in 4 weeks with general attending or advance practitioner. He will not require outpatient neurological testing.    History of Present Illness   Antonio Duncan is a 84 y.o. right handed male with recent NPH diagnosis who presents with falls. History of Afib, Hypertension, Hyperlipidemia, CHF, OSAS, prediabetes, and pulmonary hypertension with high BMI. He was discharged to rehab and from there to assisted living facility.  -He was taken to [facility] for fall yesterday trauma scan negative was sent back to assisted living facility. Where he fell again per staff no head strike also had some PTSD features and was sent to the hospital here. CT head was negative, and show stable ventriculomegaly.  He has issues with sundowning at facility, and does have some issues sleeping through the night and most days has interrupted sleep.     Patient is a poor historian, and does not recall why he is here, although upon further questioning he states that he might have slid down, and fell, but did not hit his head. He is not aware where he is currently. He cannot ambulate without assistance, is mostly bed bound or wheelchair bound. B12 folate, and TSH were checked in August which were both within normal range.      According to the prior notes, he was previously completely independent and was driving.    Review of Systems   negative  I have reviewed the patient's PMH, PSH, Social History, Family History, Meds, and Allergies  Historical Information   History reviewed. No pertinent past medical history.  History reviewed. No pertinent surgical history.  Social History     Tobacco Use    Smoking status: Former     Types: Cigarettes    Smokeless tobacco: Not on file   Substance and Sexual Activity    Alcohol use: Not Currently    Drug use: Not Currently    Sexual activity: Not Currently     E-Cigarette/Vaping     E-Cigarette/Vaping Substances     History reviewed. No pertinent family history.  Social History     Tobacco Use    Smoking status: Former     Types: Cigarettes    Smokeless tobacco: Not on file   Substance and Sexual Activity    Alcohol use: Not Currently    Drug use: Not Currently    Sexual activity: Not Currently       Current Facility-Administered Medications:     acetaminophen (TYLENOL) oral suspension 650 mg, Q6H PRN    colchicine (COLCRYS) tablet 0.6 mg, Daily    diltiazem (CARDIZEM CD) 24 hr capsule 240 mg, Daily    furosemide (LASIX) tablet 40 mg, Daily    heparin (porcine) subcutaneous injection 5,000 Units, Q8H YARITZA **AND** [COMPLETED] Platelet count, Once    melatonin tablet 3 mg, HS    senna (SENOKOT) tablet 8.6 mg, Daily    tamsulosin (FLOMAX) capsule 0.4 mg, Daily With Dinner    trimethobenzamide (TIGAN) IM injection 200 mg, Q6H PRN  Prior to Admission Medications   Prescriptions Last Dose Informant Patient Reported? Taking?   Diclofenac Sodium (VOLTAREN) 1 %   No No   Sig: Apply 2 g topically 4 (four) times a day   Patient not taking: Reported on 11/20/2024   LORazepam (ATIVAN) 1 mg tablet   No No   Sig: Take 1 tablet (1 mg total) by mouth 1 (one) time for 1 dose   acetaminophen (TYLENOL) 325 mg tablet   No No   Sig: Take 2 tablets (650 mg total) by mouth every 6 (six) hours as needed for mild pain    Patient not taking: Reported on 11/20/2024   apixaban (Eliquis) 5 mg   Yes Yes   Sig: Take 5 mg by mouth 2 (two) times a day   atorvastatin (LIPITOR) 20 mg tablet   Yes Yes   Sig: Take 20 mg by mouth daily   bisacodyl (DULCOLAX) 5 mg EC tablet   Yes Yes   Sig: Take 10 mg by mouth if needed for constipation suppository   colchicine (COLCRYS) 0.6 mg tablet   No No   Sig: Take 1 tablet (0.6 mg total) by mouth daily for 5 days   diltiazem (CARDIZEM CD) 240 mg 24 hr capsule   Yes Yes   Sig: Take 240 mg by mouth daily   furosemide (LASIX) 40 mg tablet   Yes Yes   Sig: Take 40 mg by mouth daily   magnesium hydroxide (MILK OF MAGNESIA) 400 mg/5 mL oral suspension   Yes Yes   Sig: Take by mouth if needed for constipation   melatonin 3 mg   No No   Sig: Take 1 tablet (3 mg total) by mouth daily at bedtime as needed (Insomnia)   melatonin 3 mg   No Yes   Sig: Take 1 tablet (3 mg total) by mouth daily at bedtime   tamsulosin (FLOMAX) 0.4 mg   Yes Yes   Sig: Take 0.4 mg by mouth daily with dinner   warfarin (COUMADIN) 2.5 mg tablet   Yes No   Sig: Take 2.5 mg by mouth daily   Patient not taking: Reported on 11/20/2024      Facility-Administered Medications: None     Sulfa antibiotics    Objective :  Temp:  [98.2 °F (36.8 °C)-98.7 °F (37.1 °C)] 98.2 °F (36.8 °C)  HR:  [] 71  BP: (103-135)/(59-98) 103/65  Resp:  [16-21] 16  SpO2:  [92 %-98 %] 98 %  O2 Device: Nasal cannula  Nasal Cannula O2 Flow Rate (L/min):  [2 L/min] 2 L/min    Physical ExamNeurological Exam      Lab Results: I have reviewed the following results:CBC:   Results from last 7 days   Lab Units 11/28/24 0140 11/27/24 2000   WBC Thousand/uL  --  6.70   RBC Million/uL  --  3.67*   HEMOGLOBIN g/dL  --  11.3*   HEMATOCRIT %  --  36.0*   MCV fL  --  98   PLATELETS Thousands/uL 158 203   , BMP/CMP:   Results from last 7 days   Lab Units 11/27/24  2043   SODIUM mmol/L 137   POTASSIUM mmol/L 3.8   CHLORIDE mmol/L 101   CO2 mmol/L 28   BUN mg/dL 24   CREATININE  "mg/dL 1.27   CALCIUM mg/dL 8.8   AST U/L 10*   ALT U/L 6*   ALK PHOS U/L 77   EGFR ml/min/1.73sq m 51   , Vitamin B12:   , HgBA1C:   , TSH:   Results from last 7 days   Lab Units 11/27/24 2043   TSH 3RD GENERATON uIU/mL 0.863   , Coagulation:   , Lipid Profile:   , Ammonia:   , Urinalysis:   Results from last 7 days   Lab Units 11/27/24  2249   COLOR UA  Yellow   CLARITY UA  Clear   SPEC GRAV UA  1.017   PH UA  5.0   LEUKOCYTES UA  Negative   NITRITE UA  Negative   GLUCOSE UA mg/dl Negative   KETONES UA mg/dl Negative   BILIRUBIN UA  Negative   BLOOD UA  Trace*   , Drug Screen:   , Medication Drug Levels:       Invalid input(s): \"CARBAMAZEPINE\", \"OXCARBAZEPINE\"  Recent Labs     11/27/24 2000 11/27/24 2043 11/28/24  0140   WBC 6.70  --   --    HGB 11.3*  --   --    HCT 36.0*  --   --      --  158   SODIUM  --  137  --    K  --  3.8  --    CL  --  101  --    CO2  --  28  --    BUN  --  24  --    CREATININE  --  1.27  --    GLUC  --  119  --      Imaging Results Review: I personally reviewed the following image studies/reports in PACS and discussed pertinent findings with Radiology: CT head. My interpretation of the radiology images/reports is: shows ventriculomegaly otherwise no acute findings.  Other Study Results Review: No additional pertinent studies reviewed.    VTE Prophylaxis: Sequential compression device (Venodyne)       "

## 2024-11-28 NOTE — ASSESSMENT & PLAN NOTE
-On Cardizem rate under control  -Recent outpatient medication list shows patient is on Eliquis  -With frequent falls, to discuss with family members regarding risk versus benefit and to see if hold or resume Eliquis

## 2024-11-28 NOTE — PLAN OF CARE
Problem: PHYSICAL THERAPY ADULT  Goal: Performs mobility at highest level of function for planned discharge setting.  See evaluation for individualized goals.  Description: Treatment/Interventions: Functional transfer training, LE strengthening/ROM, Therapeutic exercise, Endurance training, Cognitive reorientation, Patient/family training, Equipment eval/education, Bed mobility, Gait training, Spoke to nursing, Continued evaluation          See flowsheet documentation for full assessment, interventions and recommendations.  Outcome: Progressing  Note: Prognosis: Fair  Problem List: Decreased strength, Decreased endurance, Decreased mobility, Impaired balance, Decreased coordination, Decreased cognition, Decreased safety awareness, Pain  Assessment: Pt is 84 y.o. male seen for PT evaluation s/p admit to [facility] on 11/27/2024 w/ Recurrent falls. PT consulted to assess pt's functional mobility and d/c needs. Order placed for PT eval and tx, w/ up as tolerated order. Comorbidities affecting pt's physical performance at time of assessment include: history of falls, normal pressure hydrocephalus and subsequent confusion, CHF, A-fib, hyperplasia . PTA, pt was requiring A for mobility, ambulates household distances, resident of [facility], and retired. Personal factors affecting pt at time of IE include: inaccessible home environment, ambulating w/ assistive device, inability to ambulate household distances, inability to navigate community distances, inability to navigate level surfaces w/o external assistance, decreased cognition, positive fall history, anxiety, unable to perform physical activity, inability to perform IADLs, and inability to perform ADLs. Please find objective findings from PT assessment regarding body systems outlined above with impairments and limitations including weakness, impaired balance, decreased endurance, impaired coordination, gait deviations, pain, decreased activity tolerance, decreased  functional mobility tolerance, decreased safety awareness, fall risk, and decreased cognition. The following objective measures performed on IE also reveal limitations: Barthel Index: 20/100, Modified Curtis: 4 (moderate/severe disability), and AM-PAC 6-Clicks: 9/24. Pt's clinical presentation is currently unstable/unpredictable seen in pt's presentation of continued need of medical management and monitoring, decreased cognitive status, decreased strength and balance, leading to an increased risk of falls, decreased endurance and activity tolerance limiting mobility. Pt to benefit from continued PT tx to address deficits as defined above and maximize level of functional independent mobility and consistency. From PT/mobility standpoint, recommendation at time of d/c would be Level 2 Moderate Resource utilization return to facility with rehabilitation services pending progress in order to facilitate return to PLOF.  Barriers to Discharge: Other (Comment) (Decline in functional baseline as well as cognitive status.\)     Rehab Resource Intensity Level, PT: II (Moderate Resource Intensity)    See flowsheet documentation for full assessment.

## 2024-11-28 NOTE — ED NOTES
Pt soiled, cleaned and sheets changed, straight cath performed and sent sample sent. Step-son at bedside.     Graciela Ferrari RN  11/27/24 3237

## 2024-11-28 NOTE — PLAN OF CARE
Problem: SAFETY ADULT  Goal: Patient will remain free of falls  Description: INTERVENTIONS:  - Educate patient/family on patient safety including physical limitations  - Instruct patient to call for assistance with activity   - Consult OT/PT to assist with strengthening/mobility   - Keep Call bell within reach  - Keep bed low and locked with side rails adjusted as appropriate  - Keep care items and personal belongings within reach  - Initiate and maintain comfort rounds  - Make Fall Risk Sign visible to staff  - Offer Toileting every 2 Hours, in advance of need  - Initiate/Maintain bed alarm  - Obtain necessary fall risk management equipment:   - Apply yellow socks and bracelet for high fall risk patients  - Consider moving patient to room near nurses station  Outcome: Progressing     Problem: DISCHARGE PLANNING  Goal: Discharge to home or other facility with appropriate resources  Description: INTERVENTIONS:  - Identify barriers to discharge w/patient and caregiver  - Arrange for needed discharge resources and transportation as appropriate  - Identify discharge learning needs (meds, wound care, etc.)  - Arrange for interpretive services to assist at discharge as needed  - Refer to Case Management Department for coordinating discharge planning if the patient needs post-hospital services based on physician/advanced practitioner order or complex needs related to functional status, cognitive ability, or social support system  Outcome: Progressing

## 2024-11-28 NOTE — ED PROVIDER NOTES
Time reflects when diagnosis was documented in both MDM as applicable and the Disposition within this note       Time User Action Codes Description Comment    11/27/2024  9:37 PM Celestina Rosario Add [R26.2] Ambulatory dysfunction     11/27/2024  9:37 PM Celestina Rosario Add [R93.0] Abnormal head CT     11/27/2024 11:58 PM Maria Isabel Solis Add [R29.6] Recurrent falls     11/27/2024 11:58 PM Maria Isabel Solis Add [G91.2] Radiographic suspicion normal pressure hydrocephalus on CT head           ED Disposition       ED Disposition   Admit    Condition   Stable    Date/Time   Wed Nov 27, 2024  9:37 PM    Comment   Case was discussed with hospitalist and the patient's admission status was agreed to be Admission Status: observation status to the service of Dr. Solis .               Assessment & Plan       Medical Decision Making  Differential subdural hematoma, Electrolyte abnormality, UTI.    Patient is a 84-year-old male present with department no acute respiratory distress and vital signs unremarkable.  Patient is alert oriented to self and place.  Lab work overall nonacute.  Head CT questionable ventriculomegaly.  Patient was only able to ambulate unassisted.  Will admit due to ambulatory dysfunction and CT findings.  Patient is agreeable disposition.    Amount and/or Complexity of Data Reviewed  Labs: ordered.  Radiology: ordered.  ECG/medicine tests: ordered and independent interpretation performed.    Risk  Decision regarding hospitalization.         EKG shows atrial fibrillation with a rate of 100.  Right bundle branch block.  EKG appears similar to prior.    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       History reviewed. No pertinent past medical history.   History reviewed. No pertinent surgical history.   History reviewed. No pertinent family history.   Social History     Tobacco Use    Smoking status: Former     Types: Cigarettes   Substance Use Topics    Alcohol use: Not Currently    Drug use: Not Currently      E-Cigarette/Vaping      E-Cigarette/Vaping Substances      I have reviewed and agree with the history as documented.     HPI    Patient is an 84-year-old male present emerged department for multiple falls.  Patient was found this evening on the floor.  Staff said that patient slid off his chair.  Patient then proceeded to have a PTSD-like event and was crawling around on the ground.  Patient did serve for the [employer].  On arrival he had no concerns at this time.  History of hyperlipidemia, atrial fibrillation.        Review of Systems   Constitutional:  Negative for chills and fever.   HENT:  Negative for ear pain and sore throat.    Eyes:  Negative for pain and visual disturbance.   Respiratory:  Negative for cough and shortness of breath.    Cardiovascular:  Negative for chest pain and palpitations.   Gastrointestinal:  Negative for abdominal pain and vomiting.   Genitourinary:  Negative for dysuria and hematuria.   Musculoskeletal:  Negative for arthralgias and back pain.   Skin:  Negative for color change and rash.   Neurological:  Positive for weakness. Negative for seizures and syncope.   Psychiatric/Behavioral:  Positive for confusion.    All other systems reviewed and are negative.          Objective       ED Triage Vitals   Temperature Pulse Blood Pressure Respirations SpO2 Patient Position - Orthostatic VS   11/27/24 1917 11/27/24 1917 11/27/24 1917 11/27/24 1917 11/27/24 1917 11/27/24 1917   98.2 °F (36.8 °C) (!) 109 131/98 17 98 % Sitting      Temp Source Heart Rate Source BP Location FiO2 (%) Pain Score    11/27/24 1917 11/27/24 2130  11/27/24 1917 -- 11/28/24 0016    Oral Monitor Right arm  No Pain      Vitals      Date and Time Temp Pulse SpO2 Resp BP Pain Score FACES Pain Rating User   12/01/24 0952 -- -- -- -- -- No Pain --    12/01/24 0900 -- -- -- -- -- No Pain -- BC   12/01/24 0828 98.2 °F (36.8 °C) 93 95 % -- 128/80 -- -- CV   12/01/24 0826 -- 87 98 % -- 128/80 -- -- DII   11/30/24 2300 -- 78 98 % -- -- -- -- AS   11/30/24 2111 -- -- 94 % -- -- 4 -- AS   11/30/24 2100 -- 94 96 % -- -- -- -- AS   11/30/24 1930 -- -- 96 % -- -- -- -- AS   11/30/24 1700 -- 76 -- -- -- -- -- AS   11/30/24 0900 -- -- -- -- -- No Pain -- JK   11/30/24 0806 -- -- 97 % -- -- -- -- JK   11/30/24 0740 -- -- -- 20 -- -- -- JK   11/30/24 0740 98 °F (36.7 °C) 91 94 % -- 132/75 -- -- DII   11/30/24 0100 98.1 °F (36.7 °C) 94 94 % 16 131/112 -- -- DII   11/29/24 2109 99.9 °F (37.7 °C) 93 93 % -- 126/98 -- -- DII   11/29/24 2109 -- -- -- 16 -- No Pain -- CJ   11/29/24 1438 99 °F (37.2 °C) 75 98 % -- 102/59 -- -- DII   11/29/24 0736 97.5 °F (36.4 °C) 102 94 % -- 115/71 -- -- DII   11/28/24 2344 98.2 °F (36.8 °C) 107 96 % 17 115/57 -- -- DII   11/28/24 2003 -- -- -- -- -- No Pain -- FM   11/28/24 1444 98.6 °F (37 °C) 96 96 % -- 115/62 -- -- DII   11/28/24 0924 -- -- -- -- -- No Pain -- KW   11/28/24 0754 98.2 °F (36.8 °C) 71 98 % -- 103/65 -- -- DII   11/28/24 0016 -- -- -- -- -- No Pain -- JF   11/27/24 2359 98.7 °F (37.1 °C) 92 92 % 16 123/67 -- -- DII   11/27/24 2358 98.7 °F (37.1 °C) 100 95 % 16 123/67 -- -- DII   11/27/24 2330 -- 90 95 % 20 125/61 -- -- KL   11/27/24 2230 -- 93 94 % 19 115/60 -- -- KL   11/27/24 2200 -- 89 96 % 21 108/61 -- -- KL   11/27/24 2130 -- 96 97 % 18 135/59 -- -- KL   11/27/24 1917 98.2 °F (36.8 °C) 109 98 % 17 131/98 -- -- KL            Physical Exam  Vitals and nursing note reviewed.   Constitutional:       General: He is not in acute distress.     Appearance: He is well-developed.   HENT:      Head: Normocephalic and atraumatic.       Mouth/Throat:      Mouth: Mucous membranes are moist.      Pharynx: Oropharynx is clear.   Eyes:      Extraocular Movements: Extraocular movements intact.      Conjunctiva/sclera: Conjunctivae normal.      Pupils: Pupils are equal, round, and reactive to light.   Cardiovascular:      Rate and Rhythm: Normal rate and regular rhythm.      Heart sounds: No murmur heard.  Pulmonary:      Effort: Pulmonary effort is normal. No respiratory distress.      Breath sounds: Normal breath sounds.   Abdominal:      Palpations: Abdomen is soft.      Tenderness: There is no abdominal tenderness.   Musculoskeletal:         General: No swelling.      Cervical back: Neck supple.      Comments: Ruben lower extremity edema   Skin:     General: Skin is warm and dry.      Capillary Refill: Capillary refill takes less than 2 seconds.   Neurological:      Mental Status: He is alert.      GCS: GCS eye subscore is 4. GCS verbal subscore is 5. GCS motor subscore is 5.      Comments: Oriented to time and place   Psychiatric:         Mood and Affect: Mood normal.         Results Reviewed       Procedure Component Value Units Date/Time    HS Troponin I 4hr [[id number]]  (Normal) Collected: 11/28/24 0142    Lab Status: Final result Specimen: Blood from Arm, Left Updated: 11/28/24 0410     hs TnI 4hr 30 ng/L      Delta 4hr hsTnI 9 ng/L     TSH, 3rd generation with Free T4 reflex [[id number]]  (Normal) Collected: 11/27/24 2043    Lab Status: Final result Specimen: Blood from Arm, Right Updated: 11/28/24 0052     TSH 3RD GENERATON 0.863 uIU/mL     HS Troponin I 2hr [[id number]]  (Normal) Collected: 11/27/24 2230    Lab Status: Final result Specimen: Blood from Arm, Right Updated: 11/27/24 2301     hs TnI 2hr 28 ng/L      Delta 2hr hsTnI 7 ng/L     Urine Microscopic [[id number]]  (Abnormal) Collected: 11/27/24 2249    Lab Status: Final result Specimen: Urine, Straight Cath Updated: 11/27/24 2255     RBC, UA 4-10 /hpf      WBC, UA 1-2 /hpf      Epithelial  Cells Occasional /hpf      Bacteria, UA None Seen /hpf     UA w Reflex to Microscopic w Reflex to Culture [[id number]]  (Abnormal) Collected: 11/27/24 2249    Lab Status: Final result Specimen: Urine, Straight Cath Updated: 11/27/24 2253     Color, UA Yellow     Clarity, UA Clear     Specific Gravity, UA 1.017     pH, UA 5.0     Leukocytes, UA Negative     Nitrite, UA Negative     Protein, UA Trace mg/dl      Glucose, UA Negative mg/dl      Ketones, UA Negative mg/dl      Urobilinogen, UA <2.0 mg/dl      Bilirubin, UA Negative     Occult Blood, UA Trace    Comprehensive metabolic panel [[id number]]  (Abnormal) Collected: 11/27/24 2043    Lab Status: Final result Specimen: Blood from Arm, Right Updated: 11/27/24 2122     Sodium 137 mmol/L      Potassium 3.8 mmol/L      Chloride 101 mmol/L      CO2 28 mmol/L      ANION GAP 8 mmol/L      BUN 24 mg/dL      Creatinine 1.27 mg/dL      Glucose 119 mg/dL      Calcium 8.8 mg/dL      Corrected Calcium 9.4 mg/dL      AST 10 U/L      ALT 6 U/L      Alkaline Phosphatase 77 U/L      Total Protein 5.9 g/dL      Albumin 3.3 g/dL      Total Bilirubin 2.46 mg/dL      eGFR 51 ml/min/1.73sq m     Narrative:      National Kid[employer] Disease Foundation guidelines for Chronic Kidney Disease (CKD):     Stage 1 with normal or high GFR (GFR > 90 mL/min/1.73 square meters)    Stage 2 Mild CKD (GFR = 60-89 mL/min/1.73 square meters)    Stage 3A Moderate CKD (GFR = 45-59 mL/min/1.73 square meters)    Stage 3B Moderate CKD (GFR = 30-44 mL/min/1.73 square meters)    Stage 4 Severe CKD (GFR = 15-29 mL/min/1.73 square meters)    Stage 5 End Stage CKD (GFR <15 mL/min/1.73 square meters)  Note: GFR calculation is accurate only with a steady state creatinine    HS Troponin 0hr (reflex protocol) [[id number]]  (Normal) Collected: 11/27/24 2000    Lab Status: Final result Specimen: Blood from Arm, Right Updated: 11/27/24 2031     hs TnI 0hr 21 ng/L     CBC and differential [[id number]]  (Abnormal) Collected:  11/27/24 2000    Lab Status: Final result Specimen: Blood from Arm, Right Updated: 11/27/24 2007     WBC 6.70 Thousand/uL      RBC 3.67 Million/uL      Hemoglobin 11.3 g/dL      Hematocrit 36.0 %      MCV 98 fL      MCH 30.8 pg      MCHC 31.4 g/dL      RDW 15.9 %      MPV 9.9 fL      Platelets 203 Thousands/uL      nRBC 0 /100 WBCs      Segmented % 83 %      Immature Grans % 0 %      Lymphocytes % 6 %      Monocytes % 11 %      Eosinophils Relative 0 %      Basophils Relative 0 %      Absolute Neutrophils 5.53 Thousands/µL      Absolute Immature Grans 0.03 Thousand/uL      Absolute Lymphocytes 0.39 Thousands/µL      Absolute Monocytes 0.73 Thousand/µL      Eosinophils Absolute 0.00 Thousand/µL      Basophils Absolute 0.02 Thousands/µL     Fingerstick Glucose (POCT) [[id number]]  (Normal) Collected: 11/27/24 1917    Lab Status: Final result Specimen: Blood Updated: 11/27/24 1918     POC Glucose 130 mg/dl             MRI brain wo contrast   Final Interpretation by Rogers Louie MD (11/29 1819)      Motion-degraded examination. No mass effect, acute intracranial hemorrhage or evidence of recent infarction. Mild scattered chronic microvascular ischemic change.      Stable ventriculomegaly out of proportion to the degree of cerebral volume loss. Correlate clinically for the presence of normal pressure hydrocephalus.      Workstation performed: [id number]         CT head without contrast   Final Interpretation by Jorje Tristan MD (11/27 2115)      No acute intracranial abnormality.  Stable chronic microangiopathic changes within the brain.   Stable ventriculomegaly out of proportion to degree of parenchymal loss raising the possibility of underlying normal pressure hydrocephalus.                  Workstation performed: [id number]         CT spine lumbar without contrast   Final Interpretation by Jorje Tristan MD (11/27 2124)      Degenerative change as noted without acute fracture/subluxation in the lumbar spine.       Workstation performed: [id number]         CT pelvis wo contrast   Final Interpretation by Jorje Tristan MD (11/27 2118)      No acute posttraumatic abnormality identified in the pelvis.      Workstation performed: [id number]         XR chest 1 view portable   Final Interpretation by Jono Ramon MD (11/28 1014)      No acute cardiopulmonary disease.            Workstation performed: [id number]             Procedures    ED Medication and Procedure Management   Prior to Admission Medications   Prescriptions Last Dose Informant Patient Reported? Taking?   Diclofenac Sodium (VOLTAREN) 1 %   No No   Sig: Apply 2 g topically 4 (four) times a day   Patient not taking: Reported on 11/20/2024   LORazepam (ATIVAN) 1 mg tablet   No No   Sig: Take 1 tablet (1 mg total) by mouth 1 (one) time for 1 dose   acetaminophen (TYLENOL) 325 mg tablet   No Yes   Sig: Take 2 tablets (650 mg total) by mouth every 6 (six) hours as needed for mild pain   apixaban (Eliquis) 5 mg   Yes Yes   Sig: Take 5 mg by mouth 2 (two) times a day   atorvastatin (LIPITOR) 20 mg tablet   Yes Yes   Sig: Take 20 mg by mouth daily   bisacodyl (DULCOLAX) 5 mg EC tablet   Yes Yes   Sig: Take 10 mg by mouth if needed for constipation suppository   colchicine (COLCRYS) 0.6 mg tablet   No No   Sig: Take 1 tablet (0.6 mg total) by mouth daily for 5 days   diltiazem (CARDIZEM CD) 240 mg 24 hr capsule   Yes Yes   Sig: Take 240 mg by mouth daily   furosemide (LASIX) 40 mg tablet   Yes Yes   Sig: Take 40 mg by mouth daily   magnesium hydroxide (MILK OF MAGNESIA) 400 mg/5 mL oral suspension   Yes Yes   Sig: Take by mouth if needed for constipation   melatonin 3 mg   No No   Sig: Take 1 tablet (3 mg total) by mouth daily at bedtime as needed (Insomnia)   melatonin 3 mg   No Yes   Sig: Take 1 tablet (3 mg total) by mouth daily at bedtime   tamsulosin (FLOMAX) 0.4 mg   Yes Yes   Sig: Take 0.4 mg by mouth daily with dinner   warfarin (COUMADIN) 2.5  mg tablet   Yes No   Sig: Take 2.5 mg by mouth daily   Patient not taking: Reported on 11/20/2024      Facility-Administered Medications: None     Discharge Medication List as of 12/1/2024 12:43 PM        CONTINUE these medications which have NOT CHANGED    Details   acetaminophen (TYLENOL) 325 mg tablet Take 2 tablets (650 mg total) by mouth every 6 (six) hours as needed for mild pain, Starting Tue 8/6/2024, No Print      apixaban (Eliquis) 5 mg Take 5 mg by mouth 2 (two) times a day, Historical Med      atorvastatin (LIPITOR) 20 mg tablet Take 20 mg by mouth daily, Historical Med      bisacodyl (DULCOLAX) 5 mg EC tablet Take 10 mg by mouth if needed for constipation suppository, Historical Med      diltiazem (CARDIZEM CD) 240 mg 24 hr capsule Take 240 mg by mouth daily, Historical Med      furosemide (LASIX) 40 mg tablet Take 40 mg by mouth daily, Historical Med      magnesium hydroxide (MILK OF MAGNESIA) 400 mg/5 mL oral suspension Take by mouth if needed for constipation, Historical Med      melatonin 3 mg Take 1 tablet (3 mg total) by mouth daily at bedtime, Starting Wed 11/20/2024, Normal      tamsulosin (FLOMAX) 0.4 mg Take 0.4 mg by mouth daily with dinner, Historical Med      colchicine (COLCRYS) 0.6 mg tablet Take 1 tablet (0.6 mg total) by mouth daily for 5 days, Starting Wed 8/7/2024, Until Mon 8/12/2024, No Print           STOP taking these medications       Diclofenac Sodium (VOLTAREN) 1 % Comments:   Reason for Stopping:         LORazepam (ATIVAN) 1 mg tablet Comments:   Reason for Stopping:         warfarin (COUMADIN) 2.5 mg tablet Comments:   Reason for Stopping:             No discharge procedures on file.  ED SEPSIS DOCUMENTATION   Time reflects when diagnosis was documented in both MDM as applicable and the Disposition within this note       Time User Action Codes Description Comment    11/27/2024  9:37 PM Celestina Rosario Add [R26.2] Ambulatory dysfunction     11/27/2024  9:37 PM Celestina Rosario  Add [R93.0] Abnormal head CT     11/27/2024 11:58 PM Maria Isabel Solis Add [R29.6] Recurrent falls     11/27/2024 11:58 PM Maria Isabel Solis Add [G91.2] Radiographic suspicion normal pressure hydrocephalus on CT head                  Celestina Rosario DO  12/02/24 7719

## 2024-11-28 NOTE — ASSESSMENT & PLAN NOTE
Wt Readings from Last 3 Encounters:   10/12/24 112 kg (248 lb)   08/06/24 113 kg (248 lb 7.3 oz)   08/21/17 117 kg (257 lb 4 oz)   -History of heart failure preserved EF  -Clinically does not look volume overloaded  -Last echo from 8/24 with EF 55% and grade 2 diastolic dysfunction  -Continue Lasix home dose

## 2024-11-28 NOTE — PHYSICAL THERAPY NOTE
Physical Therapy Evaluation     Patient's Name: Antonio Duncan    Admitting Diagnosis  Altered mental status [R41.82]  Abnormal head CT [R93.0]  Ambulatory dysfunction [R26.2]    Problem List  Patient Active Problem List   Diagnosis    Generalized weakness    Chronic acquired lymphedema    CHF (congestive heart failure) (HCC)    Atrial fibrillation (HCC)    Pain and swelling of left upper extremity    Altered mental status    Elevated liver enzymes    Radiographic suspicion normal pressure hydrocephalus on CT head    Ambulatory dysfunction    Benign prostatic hyperplasia without urinary obstruction    Bilateral sensorineural hearing loss    Chronic gout, unspecified, without tophus (tophi)    Hyperlipidemia    Benign essential hypertension    Mitral valve insufficiency, acquired    Morbid (severe) obesity due to excess calories (HCC)    Obstructive sleep apnea syndrome    Thoracic aortic aneurysm without rupture (HCC)    Recurrent falls       Past Medical History  History reviewed. No pertinent past medical history.    Past Surgical History  History reviewed. No pertinent surgical history.       11/28/24 1025   PT Last Visit   PT Visit Date 11/28/24   Note Type   Note type Evaluation   Pain Assessment   Pain Assessment Tool FLACC   Pain Rating: FLACC (Rest) - Face 0   Pain Rating: FLACC (Rest) - Legs 0   Pain Rating: FLACC (Rest) - Activity 1   Pain Rating: FLACC (Rest) - Cry 0   Pain Rating: FLACC (Rest) - Consolability 0   Score: FLACC (Rest) 1   Pain Rating: FLACC (Activity) - Face 1   Pain Rating: FLACC (Activity) - Legs 1   Pain Rating: FLACC (Activity) - Activity 1   Pain Rating: FLACC (Activity) - Cry 1   Pain Rating: FLACC (Activity) - Consolability 0   Score: FLACC (Activity) 4   Restrictions/Precautions   Weight Bearing Precautions Per Order No   Other Precautions Cognitive;Chair Alarm;Bed Alarm;Telemetry;Fall Risk;Pain   Home Living   Type of Home Assisted living  ([facility])   Home Layout One level;Performs  ADLs on one level   Bathroom Shower/Tub Tub/shower unit   Bathroom Toilet Raised   Bathroom Equipment Grab bars in shower;Grab bars around toilet   Bathroom Accessibility Accessible via walker   Home Equipment Walker;Other (Comment)  (Rollator)   Prior Function   Level of [place] Needs assistance with ADLs;Needs assistance with functional mobility;Needs assistance with IADLS   Lives With Facility staff   Receives Help From Family;Other (Comment)  (Step son and facility staff)   IADLs Family/Friend/Other provides transportation;Family/Friend/Other provides meals;Family/Friend/Other provides medication management   Falls in the last 6 months 1 to 4  (1 fall PTA)   Vocational Retired  (Pt was a )   Comments Pt is confused a poor historian   General   Family/Caregiver Present No   Cognition   Overall Cognitive Status Impaired   Arousal/Participation Alert   Orientation Level Oriented to person;Disoriented to place;Disoriented to time;Disoriented to situation   Memory Decreased short term memory   Following Commands Follows one step commands without difficulty   RLE Assessment   RLE Assessment X   Strength RLE   RLE Overall Strength 3+/5   LLE Assessment   LLE Assessment X   Strength LLE   LLE Overall Strength 3+/5   Light Touch   RLE Light Touch Grossly intact   LLE Light Touch Grossly intact   Bed Mobility   Rolling R 3  Moderate assistance   Additional items Assist x 2;Increased time required;Verbal cues;LE management   Rolling L 3  Moderate assistance   Additional items Assist x 2;Increased time required;Verbal cues;LE management   Supine to Sit 3  Moderate assistance   Additional items Assist x 2;Increased time required;Verbal cues;LE management   Sit to Supine 3  Moderate assistance   Additional items Assist x 2;Increased time required;LE management;Verbal cues   Transfers   Sit to Stand 4  Minimal assistance   Additional items Assist x 2;Increased time required;Verbal cues   Stand to Sit 4  Minimal  assistance   Additional items Assist x 2;Increased time required;Verbal cues   Additional Comments Pt was unable to march during standing, so gait was not assessed today.   Ambulation/Elevation   Ambulation/Elevation Additional Comments Pt was unable to march during standing, so gait was not assessed today.   Balance   Static Sitting Fair   Dynamic Sitting Fair -   Static Standing Poor +   Dynamic Standing Poor -   Ambulatory Zero   Activity Tolerance   Activity Tolerance Patient tolerated treatment well   Nurse Made Aware RN Samra made aware of progress. Also of note, patient had bleeding from his penis. PCA Angela helped clean patient and we made Samra aware of this as well.   Assessment   Prognosis Fair   Problem List Decreased strength;Decreased endurance;Decreased mobility;Impaired balance;Decreased coordination;Decreased cognition;Decreased safety awareness;Pain   Assessment Pt is 84 y.o. male seen for PT evaluation s/p admit to [facility] on 11/27/2024 w/ Recurrent falls. PT consulted to assess pt's functional mobility and d/c needs. Order placed for PT eval and tx, w/ up as tolerated order. Comorbidities affecting pt's physical performance at time of assessment include: history of falls, normal pressure hydrocephalus and subsequent confusion, CHF, A-fib, hyperplasia . PTA, pt was requiring A for mobility, ambulates household distances, resident of [facility], and retired. Personal factors affecting pt at time of IE include: inaccessible home environment, ambulating w/ assistive device, inability to ambulate household distances, inability to navigate community distances, inability to navigate level surfaces w/o external assistance, decreased cognition, positive fall history, anxiety, unable to perform physical activity, inability to perform IADLs, and inability to perform ADLs. Please find objective findings from PT assessment regarding body systems outlined above with impairments and limitations including  weakness, impaired balance, decreased endurance, impaired coordination, gait deviations, pain, decreased activity tolerance, decreased functional mobility tolerance, decreased safety awareness, fall risk, and decreased cognition. The following objective measures performed on IE also reveal limitations: Barthel Index: 20/100, Modified [place]: 4 (moderate/severe disability), and AM-PAC 6-Clicks: 9/24. Pt's clinical presentation is currently unstable/unpredictable seen in pt's presentation of continued need of medical management and monitoring, decreased cognitive status, decreased strength and balance, leading to an increased risk of falls, decreased endurance and activity tolerance limiting mobility. Pt to benefit from continued PT tx to address deficits as defined above and maximize level of functional independent mobility and consistency. From PT/mobility standpoint, recommendation at time of d/c would be Level 2 Moderate Resource utilization return to facility with rehabilitation services pending progress in order to facilitate return to PLOF.   Barriers to Discharge Other (Comment)  (Decline in functional baseline as well as cognitive status.\)   Goals   STG Expiration Date 12/08/24   Short Term Goal #1 In 10 days: Increase bilateral LE strength 1 grade to facilitate independent mobility, Perform all bed mobility tasks with min A of 1 to decrease caregiver burden, Perform all transfers with min A of 1 to improve independence, Ambulate > 50 ft. with RW with min A of 1 w/o LOB and w/ normalized gait pattern 100% of the time, and Increase all balance 1 grade to decrease risk for falls   Plan   Treatment/Interventions Functional transfer training;LE strengthening/ROM;Therapeutic exercise;Endurance training;Cognitive reorientation;Patient/family training;Equipment eval/education;Bed mobility;Gait training;Spoke to nursing;Continued evaluation   PT Frequency 3-5x/wk   Discharge Recommendation   Rehab Resource Intensity  Level, PT II (Moderate Resource Intensity)   AM-PAC Basic Mobility Inpatient   Turning in Flat Bed Without Bedrails 2   Lying on Back to Sitting on Edge of Flat Bed Without Bedrails 2   Moving Bed to Chair 1   Standing Up From Chair Using Arms 2   Walk in Room 1   Climb 3-5 Stairs With Railing 1   Basic Mobility Inpatient Raw Score 9   [facility] Highest Level Of Mobility   -[facility] Goal 3: Sit at edge of bed   -HL Achieved 5: Stand (1 or more minutes)   Modified [place]vier Scale   Modified [place] Scale 4   Barthel Index   Feeding 5   Bathing 0   Grooming Score 0   Dressing Score 5   Bladder Score 0   Bowels Score 0   Toilet Use Score 5   Transfers (Bed/Chair) Score 5   Mobility (Level Surface) Score 0   Stairs Score 0   Barthel Index Score 20         Mele Luo, PT

## 2024-11-28 NOTE — CASE MANAGEMENT
Case Management Assessment & Discharge Planning Note    Patient name Antonio Duncan  Location /-01 MRN [id number]  : 1940 Date 2024       Current Admission Date: 2024  Current Admission Diagnosis:Recurrent falls   Patient Active Problem List    Diagnosis Date Noted Date Diagnosed    Recurrent falls 2024     Bilateral sensorineural hearing loss 2024     Benign essential hypertension 2024     Thoracic aortic aneurysm without rupture (HCC) 2024     Benign prostatic hyperplasia without urinary obstruction 2024     Chronic gout, unspecified, without tophus (tophi) 2024     Morbid (severe) obesity due to excess calories (HCC) 2024     Ambulatory dysfunction 2024     Generalized weakness 2024     Chronic acquired lymphedema 2024     CHF (congestive heart failure) (HCC) 2024     Atrial fibrillation (HCC) 2024     Pain and swelling of left upper extremity 2024     Altered mental status 2024     Elevated liver enzymes 2024     Radiographic suspicion normal pressure hydrocephalus on CT head 2024     Mitral valve insufficiency, acquired 2017     Obstructive sleep apnea syndrome 2014     Hyperlipidemia 2014       LOS (days): 0  Geometric Mean LOS (GMLOS) (days):   Days to GMLOS:     OBJECTIVE:              Current admission status: Observation       Preferred Pharmacy:   [id number] - [place], [place] - [address] [place] [id number]  Phone: [phone] Fax: [phone]    Primary Care Provider: Rudi Garza MD    Primary Insurance: MEDICARE  Secondary Insurance:  FOR LIFE    ASSESSMENT:  Active Health Care Proxies    There are no active Health Care Proxies on file.       Advance Directives  Does patient have a Health Care POA?: No  Was patient offered paperwork?: No  Does patient currently have a Health Care decision maker?: Yes,  please see Health Care Proxy section  Does patient have Advance Directives?: No  Was patient offered paperwork?: Yes  Primary Contact: Vj Velasco is step son         Readmission Root Cause  30 Day Readmission: No    Patient Information  Admitted from:: Home  Mental Status: Confused  During Assessment patient was accompanied by: Other-Comment (Step son)  Assessment information provided by:: Other - please comment (Step emili carrizales)  Primary Caregiver: Other (Comment)  Caregiver's Name:: [facility]  Caregiver's Relationship to Patient:: Facility Staff  Support Systems: Family members  What city do you live in[place]  Home entry access options. Select all that apply.: No steps to enter home  Type of Current Residence: Other (Comment) (personal care home)  Is patient a ?: Yes  Is [facility] with [facility] ([facility])?: Yes  Is patient service connected?: Yes (30% per prior note)    Activities of Daily Living Prior to Admission  Functional Status: Assistance  Completes ADLs independently?: No  Level of ADL dependence: Assistance  Ambulates independently?: No  Level of ambulatory dependence: Assistance  Does patient use assisted devices?: Yes  Assisted Devices (DME) used: Rollator  Does patient currently own DME?: Yes  What DME does the patient currently own?: Rollator  Does patient have a history of Outpatient Therapy (PT/OT)?: No  Does the patient have a history of Short-Term Rehab?: Yes ([facility])  Does patient have a history of HHC?: No  Does patient currently have HHC?: No         Patient Information Continued  Income Source: SSI/SSD  Does patient have prescription coverage?: Yes  Does patient receive dialysis treatments?: No  Does patient have a history of substance abuse?: No  Does patient have a history of Mental Health Diagnosis?: No         Means of Transportation  [facility] Other (Comment[facility] ([facility] vs [facility])      Social Determinants of  Health (SDOH)      Flowsheet Row Most Recent Value   Housing Stability    In the last 12 months, was there a time when you were not able to pay the mortgage or rent on time? N   In the past 12 months, how many times have you moved where you were living? 1   At any time in the past 12 months, were you homeless or living in a shelter (including now)? N   Transportation Needs    In the past 12 months, has lack of transportation kept you from medical appointments or from getting medications? no   In the past 12 months, has lack of transportation kept you from meetings, work, or from getting things needed for daily living? No   Food Insecurity    Within the past 12 months, you worried that your food would run out before you got the money to buy more. Never true   Within the past 12 months, the food you bought just didn't last and you didn't have money to get more. Never true   Utilities    In the past 12 months has the electric, gas, oil, or water company threatened to shut off services in your home? No            DISCHARGE DETAILS:    Discharge planning discussed with:: Step Isai Zabala  Freedom of Choice: Yes  Comments - Freedom of Choice: CM discussed freedom of choice as it pertains to discharge planning. Vj informed cm that [facility] is unwiling to take patient back until he gets stronger. Vj in agreement with pt going into rehab. First choice is [facility]. CM spoke with dayne from [facility] who is reviewing patient. Vj already has family at [facility].  CM contacted family/caregiver?: Yes  Were Treatment Team discharge recommendations reviewed with patient/caregiver?: Yes  Did patient/caregiver verbalize understanding of patient care needs?: Yes  Were patient/caregiver advised of the risks associated with not following Treatment Team discharge recommendations?: Yes    Contacts  Patient Contacts: Ester Bhaktat (Son)  Relationship to Patient:: Family  Contact Method: Phone  Phone  Number: [phone] (  Reason/Outcome: Continuity of Care, Emergency Contact, Referral, Discharge Planning    Requested Home Health Care         Is the patient interested in HHC at discharge?: No    DME Referral Provided  Referral made for DME?: No    Other Referral/Resources/Interventions Provided:  Referral Comments: referral sent to [facility]    Would you like to participate in our \[facility]\"" Pharmacy service program?  : No - Declined    Treatment Team Recommendation: SNF  Discharge Destination Plan:: SNF  Transport at Discharge : Wheelchair van, BLS Ambulance

## 2024-11-28 NOTE — ASSESSMENT & PLAN NOTE
-Since August of this year when he was diagnosed with NPH he has been having recurrent falls.  He was discharged to rehab and from there to assisted living facility.  -He was taken to [facility] for fall yesterday trauma scan negative was sent back to [facility].  Where he fell again per staff no head strike also had some PTSD features and was sent to the hospital  -CT head, CT lumbar spine and pelvis negative for fracture or subluxation  -Has DJD  -Also recent diagnosis of NPH, patient was seen by neurology , refrred for neurosurgery consultation that is scheduled next month  -Continue PT/OT eval- may need to be dced to SNF  -Will check B12/TSH/RPR  -Son-in-law at bedside asking if further neurology workup needs to be done for recurrent falls, other than NPH, consider in-house neurology evaluation

## 2024-11-28 NOTE — H&P
H&P - Hospitalist   Name: Antonio Duncan 84 y.o. male I MRN: [id number]  Unit/Bed#: -01 I Date of Admission: 11/27/2024   Date of Service: 11/27/2024 I Hospital Day: 0     Assessment & Plan  Recurrent falls  -Since August of this year when he was diagnosed with NPH he has been having recurrent falls.  He was discharged to rehab and from there to assisted living facility.  -He was taken to [facility] for fall yesterday trauma scan negative was sent back to [facility].  Where he fell again per staff no head strike also had some PTSD features and was sent to the hospital  -CT head, CT lumbar spine and pelvis negative for fracture or subluxation  -Has DJD  -Also recent diagnosis of NPH, patient was seen by neurology , refrred for neurosurgery consultation that is scheduled next month  -Continue PT/OT eval- may need to be dced to SNF  -Will check B12/TSH/RPR  -Son-in-law at bedside asking if further neurology workup needs to be done for recurrent falls, other than NPH, consider in-house neurology evaluation  Ambulatory dysfunction  -See above  Chronic acquired lymphedema  -Has chronic lymphedema  CHF (congestive heart failure) (HCC)  Wt Readings from Last 3 Encounters:   10/12/24 112 kg (248 lb)   08/06/24 113 kg (248 lb 7.3 oz)   08/21/17 117 kg (257 lb 4 oz)   -History of heart failure preserved EF  -Clinically does not look volume overloaded  -Last echo from 8/24 with EF 55% and grade 2 diastolic dysfunction  -Continue Lasix home dose        Atrial fibrillation (HCC)  -On Cardizem rate under control  -Recent outpatient medication list shows patient is on Eliquis  -With frequent falls, to discuss with family members regarding risk versus benefit and to see if hold or resume Eliquis  Radiographic suspicion normal pressure hydrocephalus on CT head  -See above, outpatient neurology and neurosurgery appointment  Benign prostatic hyperplasia without urinary obstruction  -Monitor urine output closely  Bilateral  sensorineural hearing loss  -Supportive care  Hyperlipidemia  -Statin  Benign essential hypertension  -BP stable  Morbid (severe) obesity due to excess calories (HCC)    Obstructive sleep apnea syndrome  -CPAP at night  Thoracic aortic aneurysm without rupture (HCC)  -Follow-up with PCP surveillance      VTE Pharmacologic Prophylaxis:   Moderate Risk (Score 3-4) - Pharmacological DVT Prophylaxis Ordered: heparin.  Code Status: Prior   Discussion with family: Updated  (son in law) at bedside.    Anticipated Length of Stay: Patient will be admitted on an observation basis with an anticipated length of stay of less than 2 midnights secondary to ambulatory dysfunction.    History of Present Illness   Chief Complaint: nader Duncan is a 84 y.o. male with a PMH of hypertension, diabetes, hyperlipidemia, BPH, heart failure preserved EF, atrial fibrillation on Eliquis, recent diagnosis of NPH, recurrent falls, ambulatory dysfunction who presents with another fall episode.  Patient is extremely hard of hearing and has cognitive impairment.  Son-in-law at bedside stated that since he was admitted here in August 2024 and was diagnosed with NPH he has significantly declined.  He was discharged to rehab he was able to recover some strength from there was sent to [facility].  Since then he has had few episodes of fall.  Yesterday he fell and was sent to [facility] in ER trauma scan was negative he was sent back to [facility].  Where he again slid out of chair to sit on the floor and fell according to the staff  Per staff at [facility] he also had some PTSD-like event and was crawling around the ground.  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  Per son-in-law this was discussed with PCP and neurology and recently his melatonin was changed from as needed to nightly thinking it could be due to lack of sleep and sleep disturbances.  No report of fever, chills, chest pain, nausea or vomiting.  No urinary symptoms.  No focal  deficit.  Patient resting comfortably offers no complaints    In ER vital signs stable  Labs essentially within normal limits  CT head, lumbar spine and pelvis no fracture or subluxation  Hospitalist called for admission for further observation/ambulatory dysfunction and possible neurology consult  Review of Systems  10 point review of system negative except for that mentioned in HPI  Historical Information   No past medical history on file.  No past surgical history on file.  Social History     Tobacco Use    Smoking status: Unknown    Smokeless tobacco: Not on file   Substance and Sexual Activity    Alcohol use: Not Currently    Drug use: Not Currently    Sexual activity: Not Currently     E-Cigarette/Vaping     E-Cigarette/Vaping Substances     Family history non-contributory  Social History:  Marital Status:    Occupation: Retired  Patient Pre-hospital Living Situation: Assisted Living  Patient Pre-hospital Level of Mobility: manual wheelchair  Patient Pre-hospital Diet Restrictions:     Meds/Allergies   I have reviewed home medications using recent Epic encounter.  Prior to Admission medications    Medication Sig Start Date End Date Taking? Authorizing Provider   acetaminophen (TYLENOL) 325 mg tablet Take 2 tablets (650 mg total) by mouth every 6 (six) hours as needed for mild pain  Patient not taking: Reported on 11/20/2024 8/6/24   Aneudy Zhu MD   atorvastatin (LIPITOR) 20 mg tablet Take 20 mg by mouth daily    Historical Provider, MD   colchicine (COLCRYS) 0.6 mg tablet Take 1 tablet (0.6 mg total) by mouth daily for 5 days 8/7/24 8/12/24  Aneudy Zhu MD   Diclofenac Sodium (VOLTAREN) 1 % Apply 2 g topically 4 (four) times a day  Patient not taking: Reported on 11/20/2024 8/6/24   Aneudy Zhu MD   diltiazem (CARDIZEM CD) 240 mg 24 hr capsule Take 240 mg by mouth daily    Historical Provider, MD   furosemide (LASIX) 40 mg tablet Take 40 mg by mouth daily    Historical Provider, MD   LORazepam (ATIVAN) 1  mg tablet Take 1 tablet (1 mg total) by mouth 1 (one) time for 1 dose 11/20/24 11/20/24  Debbie Briggs MD   melatonin 3 mg Take 1 tablet (3 mg total) by mouth daily at bedtime as needed (Insomnia) 8/6/24   Aneudy Zhu MD   melatonin 3 mg Take 1 tablet (3 mg total) by mouth daily at bedtime 11/20/24   Debbie Briggs MD   tamsulosin (FLOMAX) 0.4 mg Take 0.4 mg by mouth daily with dinner    Historical Provider, MD   warfarin (COUMADIN) 2.5 mg tablet Take 2.5 mg by mouth daily  Patient not taking: Reported on 11/20/2024    Historical Provider, MD     Allergies   Allergen Reactions    Sulfa Antibiotics Rash       Objective :  Temp:  [98.2 °F (36.8 °C)-98.7 °F (37.1 °C)] 98.7 °F (37.1 °C)  HR:  [] 92  BP: (108-135)/(59-98) 123/67  Resp:  [16-21] 16  SpO2:  [92 %-98 %] 92 %  O2 Device: None (Room air)    Physical Exam  Constitutional:       Appearance: He is obese.   HENT:      Mouth/Throat:      Mouth: Mucous membranes are moist.   Eyes:      Extraocular Movements: Extraocular movements intact.      Pupils: Pupils are equal, round, and reactive to light.   Cardiovascular:      Rate and Rhythm: Normal rate. Rhythm irregular.      Heart sounds: Murmur heard.   Pulmonary:      Effort: Pulmonary effort is normal.      Breath sounds: Normal breath sounds.   Abdominal:      General: Bowel sounds are normal.      Palpations: Abdomen is soft.   Musculoskeletal:         General: Normal range of motion.      Cervical back: Normal range of motion.      Comments: Lateral lower extremity chronic venous stasis changes and lymphedema   Skin:     General: Skin is warm.   Neurological:      General: No focal deficit present.      Mental Status: He is alert. Mental status is at baseline.   Psychiatric:         Mood and Affect: Mood normal.          Lines/Drains:            Lab Results: I have reviewed the following results:  Results from last 7 days   Lab Units 11/27/24 2000   WBC Thousand/uL 6.70   HEMOGLOBIN g/dL 11.3*    HEMATOCRIT % 36.0*   PLATELETS Thousands/uL 203   SEGS PCT % 83*   LYMPHO PCT % 6*   MONO PCT % 11   EOS PCT % 0     Results from last 7 days   Lab Units 11/27/24  2043   SODIUM mmol/L 137   POTASSIUM mmol/L 3.8   CHLORIDE mmol/L 101   CO2 mmol/L 28   BUN mg/dL 24   CREATININE mg/dL 1.27   ANION GAP mmol/L 8   CALCIUM mg/dL 8.8   ALBUMIN g/dL 3.3*   TOTAL BILIRUBIN mg/dL 2.46*   ALK PHOS U/L 77   ALT U/L 6*   AST U/L 10*   GLUCOSE RANDOM mg/dL 119         Results from last 7 days   Lab Units 11/27/24  1917   POC GLUCOSE mg/dl 130     Lab Results   Component Value Date    HGBA1C 5.9 (H) 01/04/2024    HGBA1C 5.5 06/23/2023    HGBA1C 6.1 (H) 06/09/2022           Imaging Results Review: I reviewed radiology reports from this admission including: CT head.  Other Study Results Review: No additional pertinent studies reviewed.    Administrative Statements   Topics discussed with the patient / family include symptom assessment and management.    ** Please Note: This note has been constructed using a voice recognition system. **

## 2024-11-28 NOTE — ASSESSMENT & PLAN NOTE
Wt Readings from Last 3 Encounters:   11/28/24 94.9 kg (209 lb 3.5 oz)   10/12/24 112 kg (248 lb)   08/06/24 113 kg (248 lb 7.3 oz)   -History of heart failure preserved EF  -Clinically does not look volume overloaded  -Last echo from 8/24 with EF 55% and grade 2 diastolic dysfunction  -Continue Lasix home dose

## 2024-11-28 NOTE — PROGRESS NOTES
Progress Note - Hospitalist   Name: Antonio Duncan 84 y.o. male I MRN: [id number]  Unit/Bed#: -01 I Date of Admission: 11/27/2024   Date of Service: 11/28/2024 I Hospital Day: 0    Assessment & Plan  Recurrent falls  -Since August of this year when he was diagnosed with NPH he has been having recurrent falls.  He was discharged to rehab and from there to assisted living facility.  -He was taken to [facility] for fall yesterday trauma scan negative was sent back to [facility].  Where he fell again per staff no head strike also had some PTSD features and was sent to the hospital  -CT head, CT lumbar spine and pelvis negative for fracture or subluxation  -Has DJD  -Also recent diagnosis of NPH, patient was seen by neurology , refrred for neurosurgery consultation that is scheduled next month  -Continue PT/OT eval- may need to be dced to SNF  Medically clear awaiting likely placement  Follow-up PT eval  Follow-up with case management  Ambulatory dysfunction  -See above  Chronic acquired lymphedema  -Has chronic lymphedema  CHF (congestive heart failure) (HCC)  Wt Readings from Last 3 Encounters:   11/28/24 94.9 kg (209 lb 3.5 oz)   10/12/24 112 kg (248 lb)   08/06/24 113 kg (248 lb 7.3 oz)   -History of heart failure preserved EF  -Clinically does not look volume overloaded  -Last echo from 8/24 with EF 55% and grade 2 diastolic dysfunction  -Continue Lasix home dose        Atrial fibrillation (HCC)  -On Cardizem rate under control  Continue Eliquis  Radiographic suspicion normal pressure hydrocephalus on CT head  -See above, outpatient neurology and neurosurgery appointment  Benign prostatic hyperplasia without urinary obstruction  -Monitor urine output closely  Bilateral sensorineural hearing loss  -Supportive care  Hyperlipidemia  -Statin  Benign essential hypertension  Continue home meds  -BP stable  Morbid (severe) obesity due to excess calories (HCC)    Obstructive sleep apnea syndrome  -CPAP at night  Thoracic  aortic aneurysm without rupture (HCC)  -Follow-up with PCP surveillance    VTE Pharmacologic Prophylaxis:   Moderate Risk (Score 3-4) - Pharmacological DVT Prophylaxis Ordered: apixaban (Eliquis).    Mobility:   Basic Mobility Inpatient Raw Score: 12  JH-HLM Goal: 4: Move to chair/commode  JH-HLM Achieved: 2: Bed activities/Dependent transfer  JH-HLM Goal achieved. Continue to encourage appropriate mobility.    Patient Centered Rounds: I performed bedside rounds with nursing staff today.   Discussions with Specialists or Other Care Team Provider: cm, nursing    Education and Discussions with Family / Patient:  pt, son.     Current Length of Stay: 0 day(s)  Current Patient Status: Observation   Certification Statement: The patient will continue to require additional inpatient hospital stay due to see below  Discharge Plan:  Anticipate medically cleared in the next 24 hours.  Will likely need rehab    Code Status: Level 1 - Full Code    Subjective   Denies chest pain, shortness breath and cough or fevers, chills    Objective :  Temp:  [98.2 °F (36.8 °C)-98.7 °F (37.1 °C)] 98.2 °F (36.8 °C)  HR:  [] 71  BP: (103-135)/(59-98) 103/65  Resp:  [16-21] 16  SpO2:  [92 %-98 %] 98 %  O2 Device: Nasal cannula  Nasal Cannula O2 Flow Rate (L/min):  [2 L/min] 2 L/min    Body mass index is 30.02 kg/m².     Input and Output Summary (last 24 hours):   No intake or output data in the 24 hours ending 11/28/24 1115    Physical Exam  Constitutional:       General: He is not in acute distress.     Appearance: He is well-developed. He is not diaphoretic.   HENT:      Head: Normocephalic and atraumatic.      Nose: Nose normal.      Mouth/Throat:      Pharynx: No oropharyngeal exudate.   Eyes:      General: No scleral icterus.     Conjunctiva/sclera: Conjunctivae normal.   Cardiovascular:      Rate and Rhythm: Normal rate and regular rhythm.      Heart sounds: Normal heart sounds. No murmur heard.     No friction rub. No gallop.    Pulmonary:      Effort: Pulmonary effort is normal. No respiratory distress.      Breath sounds: Normal breath sounds. No wheezing or rales.   Chest:      Chest wall: No tenderness.   Abdominal:      General: Bowel sounds are normal. There is no distension.      Palpations: Abdomen is soft.      Tenderness: There is no abdominal tenderness. There is no guarding.   Musculoskeletal:         General: No tenderness or deformity. Normal range of motion.      Cervical back: Normal range of motion and neck supple.   Skin:     General: Skin is warm and dry.      Findings: No erythema.   Neurological:      Mental Status: He is alert. Mental status is at baseline.           Lines/Drains:              Lab Results: I have reviewed the following results:   Results from last 7 days   Lab Units 11/28/24  0140 11/27/24 2000   WBC Thousand/uL  --  6.70   HEMOGLOBIN g/dL  --  11.3*   HEMATOCRIT %  --  36.0*   PLATELETS Thousands/uL 158 203   SEGS PCT %  --  83*   LYMPHO PCT %  --  6*   MONO PCT %  --  11   EOS PCT %  --  0     Results from last 7 days   Lab Units 11/27/24  2043   SODIUM mmol/L 137   POTASSIUM mmol/L 3.8   CHLORIDE mmol/L 101   CO2 mmol/L 28   BUN mg/dL 24   CREATININE mg/dL 1.27   ANION GAP mmol/L 8   CALCIUM mg/dL 8.8   ALBUMIN g/dL 3.3*   TOTAL BILIRUBIN mg/dL 2.46*   ALK PHOS U/L 77   ALT U/L 6*   AST U/L 10*   GLUCOSE RANDOM mg/dL 119         Results from last 7 days   Lab Units 11/27/24  1917   POC GLUCOSE mg/dl 130               Recent Cultures (last 7 days):         Imaging Results Review: I personally reviewed the following image studies/reports in PACS and discussed pertinent findings with Radiology: chest xray. My interpretation of the radiology images/reports is:  .  Other Study Results Review: EKG was reviewed.     Last 24 Hours Medication List:     Current Facility-Administered Medications:     acetaminophen (TYLENOL) oral suspension 650 mg, Q6H PRN    apixaban (ELIQUIS) tablet 5 mg, BID    colchicine  (COLCRYS) tablet 0.6 mg, Daily    diltiazem (CARDIZEM CD) 24 hr capsule 240 mg, Daily    furosemide (LASIX) tablet 40 mg, Daily    melatonin tablet 3 mg, HS    senna (SENOKOT) tablet 8.6 mg, Daily    tamsulosin (FLOMAX) capsule 0.4 mg, Daily With Dinner    trimethobenzamide (TIGAN) IM injection 200 mg, Q6H PRN    Administrative Statements   Today, Patient Was Seen By: Sammy Ohara MD  Topics discussed with the patient / family include symptom assessment and management.    **Please Note: This note may have been constructed using a voice recognition system.**

## 2024-11-28 NOTE — ASSESSMENT & PLAN NOTE
-Since August of this year when he was diagnosed with NPH he has been having recurrent falls.  He was discharged to rehab and from there to assisted living facility.  -He was taken to [facility] for fall yesterday trauma scan negative was sent back to [facility].  Where he fell again per staff no head strike also had some PTSD features and was sent to the hospital  -CT head, CT lumbar spine and pelvis negative for fracture or subluxation  -Has DJD  -Also recent diagnosis of NPH, patient was seen by neurology , refrred for neurosurgery consultation that is scheduled next month  -Continue PT/OT eval- may need to be dced to SNF  Medically clear awaiting likely placement  Follow-up PT eval  Follow-up with case management

## 2024-11-29 ENCOUNTER — APPOINTMENT (INPATIENT)
Dept: MRI IMAGING | Facility: HOSPITAL | Age: 84
DRG: 057 | End: 2024-11-29
Payer: MEDICARE

## 2024-11-29 LAB
ANION GAP SERPL CALCULATED.3IONS-SCNC: 5 MMOL/L (ref 4–13)
BASOPHILS # BLD AUTO: 0.01 THOUSANDS/ΜL (ref 0–0.1)
BASOPHILS NFR BLD AUTO: 0 % (ref 0–1)
BUN SERPL-MCNC: 17 MG/DL (ref 5–25)
CALCIUM SERPL-MCNC: 8.7 MG/DL (ref 8.4–10.2)
CHLORIDE SERPL-SCNC: 103 MMOL/L (ref 96–108)
CO2 SERPL-SCNC: 29 MMOL/L (ref 21–32)
CREAT SERPL-MCNC: 0.9 MG/DL (ref 0.6–1.3)
EOSINOPHIL # BLD AUTO: 0.03 THOUSAND/ΜL (ref 0–0.61)
EOSINOPHIL NFR BLD AUTO: 1 % (ref 0–6)
ERYTHROCYTE [DISTWIDTH] IN BLOOD BY AUTOMATED COUNT: 15.8 % (ref 11.6–15.1)
GFR SERPL CREATININE-BSD FRML MDRD: 78 ML/MIN/1.73SQ M
GLUCOSE SERPL-MCNC: 104 MG/DL (ref 65–140)
HCT VFR BLD AUTO: 30.8 % (ref 36.5–49.3)
HGB BLD-MCNC: 10 G/DL (ref 12–17)
IMM GRANULOCYTES # BLD AUTO: 0.03 THOUSAND/UL (ref 0–0.2)
IMM GRANULOCYTES NFR BLD AUTO: 1 % (ref 0–2)
LYMPHOCYTES # BLD AUTO: 0.96 THOUSANDS/ΜL (ref 0.6–4.47)
LYMPHOCYTES NFR BLD AUTO: 19 % (ref 14–44)
MCH RBC QN AUTO: 31 PG (ref 26.8–34.3)
MCHC RBC AUTO-ENTMCNC: 32.5 G/DL (ref 31.4–37.4)
MCV RBC AUTO: 95 FL (ref 82–98)
MONOCYTES # BLD AUTO: 0.55 THOUSAND/ΜL (ref 0.17–1.22)
MONOCYTES NFR BLD AUTO: 11 % (ref 4–12)
NEUTROPHILS # BLD AUTO: 3.47 THOUSANDS/ΜL (ref 1.85–7.62)
NEUTS SEG NFR BLD AUTO: 68 % (ref 43–75)
NRBC BLD AUTO-RTO: 0 /100 WBCS
PLATELET # BLD AUTO: 148 THOUSANDS/UL (ref 149–390)
PMV BLD AUTO: 9.2 FL (ref 8.9–12.7)
POTASSIUM SERPL-SCNC: 3.7 MMOL/L (ref 3.5–5.3)
RBC # BLD AUTO: 3.23 MILLION/UL (ref 3.88–5.62)
SODIUM SERPL-SCNC: 137 MMOL/L (ref 135–147)
TREPONEMA PALLIDUM IGG+IGM AB [PRESENCE] IN SERUM OR PLASMA BY IMMUNOASSAY: NORMAL
WBC # BLD AUTO: 5.05 THOUSAND/UL (ref 4.31–10.16)

## 2024-11-29 PROCEDURE — 80048 BASIC METABOLIC PNL TOTAL CA: CPT | Performed by: INTERNAL MEDICINE

## 2024-11-29 PROCEDURE — 70551 MRI BRAIN STEM W/O DYE: CPT

## 2024-11-29 PROCEDURE — 99232 SBSQ HOSP IP/OBS MODERATE 35: CPT | Performed by: INTERNAL MEDICINE

## 2024-11-29 PROCEDURE — 85025 COMPLETE CBC W/AUTO DIFF WBC: CPT | Performed by: INTERNAL MEDICINE

## 2024-11-29 PROCEDURE — 87081 CULTURE SCREEN ONLY: CPT | Performed by: INTERNAL MEDICINE

## 2024-11-29 RX ORDER — LORAZEPAM 2 MG/ML
1 INJECTION INTRAMUSCULAR ONCE AS NEEDED
Status: COMPLETED | OUTPATIENT
Start: 2024-11-29 | End: 2024-11-29

## 2024-11-29 RX ADMIN — LORAZEPAM 1 MG: 2 INJECTION INTRAMUSCULAR; INTRAVENOUS at 17:04

## 2024-11-29 RX ADMIN — APIXABAN 5 MG: 5 TABLET, FILM COATED ORAL at 17:01

## 2024-11-29 RX ADMIN — Medication 3 MG: at 00:20

## 2024-11-29 RX ADMIN — COLCHICINE 0.6 MG: 0.6 TABLET ORAL at 08:57

## 2024-11-29 RX ADMIN — DILTIAZEM HYDROCHLORIDE 240 MG: 240 CAPSULE, COATED, EXTENDED RELEASE ORAL at 08:57

## 2024-11-29 RX ADMIN — TAMSULOSIN HYDROCHLORIDE 0.4 MG: 0.4 CAPSULE ORAL at 17:01

## 2024-11-29 RX ADMIN — APIXABAN 5 MG: 5 TABLET, FILM COATED ORAL at 08:57

## 2024-11-29 RX ADMIN — FUROSEMIDE 40 MG: 40 TABLET ORAL at 08:57

## 2024-11-29 RX ADMIN — SENNOSIDES 8.6 MG: 8.6 TABLET, FILM COATED ORAL at 08:57

## 2024-11-29 NOTE — PROGRESS NOTES
Patient:    MRN:  [id number]    Jose M Request ID:  [id number]    Level of care reserved:  Skilled Nursing Facility    Partner Reserved:  [facility], [place] [id number] [phone]    Clinical needs requested:    Geography searched:  40 miles around [id number]    Start of Service:    Request sent:  11:01am EST on 11/28/2024 by Amada Gar    Partner reserved:  10:05am EST on 11/29/2024 by Nina Arnett    Choice list shared:

## 2024-11-29 NOTE — CASE MANAGEMENT
Case Management Progress Note    Patient name Antonio Duncan  Location /-01 MRN [id number]  : 1940 Date 2024       LOS (days): 1  Geometric Mean LOS (GMLOS) (days):   Days to GMLOS:        OBJECTIVE:        Current admission status: Inpatient  Preferred Pharmacy:   [id number] - [place], [place] - [address] [place] [address]  Phone: [phone] Fax: [phone]    Primary Care Provider: Rudi Garza MD    Primary Insurance: MEDICARE  Secondary Insurance:  FOR LIFE    PROGRESS NOTE:  CM reserved [facility] per family's choice. CM called patient's sujeyon Vj to update. Vj agreeable and also asked when MRI was going to be done as he wanted to be here to help prep patient for it due to his dementia. CM messaged MRI team and will call Vj back when a time slot is confirmed.

## 2024-11-29 NOTE — ASSESSMENT & PLAN NOTE
Wt Readings from Last 3 Encounters:   11/29/24 93.4 kg (205 lb 14.6 oz)   10/12/24 112 kg (248 lb)   08/06/24 113 kg (248 lb 7.3 oz)   -History of heart failure preserved EF  -Clinically does not look volume overloaded  -Last echo from 8/24 with EF 55% and grade 2 diastolic dysfunction  -Continue Lasix home dose

## 2024-11-29 NOTE — ASSESSMENT & PLAN NOTE
-Since August of this year when he was diagnosed with NPH he has been having recurrent falls.  He was discharged to rehab and from there to assisted living facility.  -He was taken to [facility] for fall yesterday trauma scan negative was sent back to [facility].  Where he fell again per staff no head strike also had some PTSD features and was sent to the hospital  -CT head, CT lumbar spine and pelvis negative for fracture or subluxation  -Has DJD  -Also recent diagnosis of NPH, patient was seen by neurology , refrred for neurosurgery consultation that is scheduled next month  Awaiting MRI brain  Appreciate neurology recommendations awaiting MRI brain.  -Continue PT/OT eval- may need to be dced to SNF  Medically clear awaiting likely placement  Follow-up PT eval  Follow-up with case management

## 2024-11-29 NOTE — CASE MANAGEMENT
"   Case Management Progress Note    Patient name Antonio Duncan  Location /-01 MRN [id number]  : 1940 Date 2024       LOS (days): 1  Geometric Mean LOS (GMLOS) (days):   Days to GMLOS:        OBJECTIVE:        Current admission status: Inpatient  Preferred Pharmacy:   [id number] - [place], [place] - [address] [address]  Phone: [phone] Fax: [phone]    Primary Care Provider: Rudi Garza MD    Primary Insurance: MEDICARE  Secondary Insurance:  FOR LIFE    PROGRESS NOTE:  Updated stepson that there is no confirmed time, but they are hoping to send him down \"soon\".         "

## 2024-11-29 NOTE — PROGRESS NOTES
Progress Note - Hospitalist   Name: Antonio Duncan 84 y.o. male I MRN: [id number]  Unit/Bed#: -01 I Date of Admission: 11/27/2024   Date of Service: 11/29/2024 I Hospital Day: 1    Assessment & Plan  Recurrent falls  -Since August of this year when he was diagnosed with NPH he has been having recurrent falls.  He was discharged to rehab and from there to assisted living facility.  -He was taken to [facility] for fall yesterday trauma scan negative was sent back to [facility].  Where he fell again per staff no head strike also had some PTSD features and was sent to the hospital  -CT head, CT lumbar spine and pelvis negative for fracture or subluxation  -Has DJD  -Also recent diagnosis of NPH, patient was seen by neurology , refrred for neurosurgery consultation that is scheduled next month  Awaiting MRI brain  Appreciate neurology recommendations awaiting MRI brain.  -Continue PT/OT eval- may need to be dced to SNF  Medically clear awaiting likely placement  Follow-up PT eval  Follow-up with case management  Ambulatory dysfunction  -See above  Chronic acquired lymphedema  -Has chronic lymphedema  CHF (congestive heart failure) (HCC)  Wt Readings from Last 3 Encounters:   11/29/24 93.4 kg (205 lb 14.6 oz)   10/12/24 112 kg (248 lb)   08/06/24 113 kg (248 lb 7.3 oz)   -History of heart failure preserved EF  -Clinically does not look volume overloaded  -Last echo from 8/24 with EF 55% and grade 2 diastolic dysfunction  -Continue Lasix home dose        Atrial fibrillation (HCC)  Remains rate controlled  Continue Cardizem  Continue Eliquis  Radiographic suspicion normal pressure hydrocephalus on CT head  -See above, outpatient neurology and neurosurgery appointment  Benign prostatic hyperplasia without urinary obstruction  -Monitor urine output closely  Bilateral sensorineural hearing loss  -Supportive care  Hyperlipidemia  Continue statin therapy  Benign essential hypertension  Continue home meds  -BP stable  Morbid  (severe) obesity due to excess calories (HCC)    Obstructive sleep apnea syndrome  -CPAP at night  Thoracic aortic aneurysm without rupture (HCC)  -Follow-up with PCP surveillance    VTE Pharmacologic Prophylaxis:   Moderate Risk (Score 3-4) - Pharmacological DVT Prophylaxis Ordered: apixaban (Eliquis).    Mobility:   Basic Mobility Inpatient Raw Score: 9  JH-HLM Goal: 3: Sit at edge of bed  JH-HLM Achieved: 5: Stand (1 or more minutes)  JH-HLM Goal achieved. Continue to encourage appropriate mobility.    Patient Centered Rounds: I performed bedside rounds with nursing staff today.   Discussions with Specialists or Other Care Team Provider: cm, nursing    Education and Discussions with Family / Patient:  pt, declined family update.     Current Length of Stay: 1 day(s)  Current Patient Status: Inpatient   Certification Statement: The patient will continue to require additional inpatient hospital stay due to see below  Discharge Plan:  Will need rehab.  Awaiting MRI brain.  Anticipate medically clear in 24 hours      Code Status: Level 1 - Full Code    Subjective   Denies chest pain, shortness of breath, cough, fevers, chills    Objective :  Temp:  [97.5 °F (36.4 °C)-98.6 °F (37 °C)] 97.5 °F (36.4 °C)  HR:  [] 102  BP: (115)/(57-71) 115/71  Resp:  [17] 17  SpO2:  [94 %-96 %] 94 %  O2 Device: None (Room air)    Body mass index is 29.54 kg/m².     Input and Output Summary (last 24 hours):     Intake/Output Summary (Last 24 hours) at 11/29/2024 1040  Last data filed at 11/28/2024 1815  Gross per 24 hour   Intake 830 ml   Output --   Net 830 ml       Physical Exam  Constitutional:       General: He is not in acute distress.     Appearance: He is well-developed. He is not diaphoretic.   HENT:      Head: Normocephalic and atraumatic.      Nose: Nose normal.      Mouth/Throat:      Pharynx: No oropharyngeal exudate.   Eyes:      General: No scleral icterus.     Conjunctiva/sclera: Conjunctivae normal.   Cardiovascular:       Rate and Rhythm: Normal rate and regular rhythm.      Heart sounds: Normal heart sounds. No murmur heard.     No friction rub. No gallop.   Pulmonary:      Effort: Pulmonary effort is normal. No respiratory distress.      Breath sounds: Normal breath sounds. No wheezing or rales.   Chest:      Chest wall: No tenderness.   Abdominal:      General: Bowel sounds are normal. There is no distension.      Palpations: Abdomen is soft.      Tenderness: There is no abdominal tenderness. There is no guarding.   Musculoskeletal:         General: No tenderness or deformity. Normal range of motion.      Cervical back: Normal range of motion and neck supple.   Skin:     General: Skin is warm and dry.      Findings: No erythema.   Neurological:      Mental Status: He is alert. Mental status is at baseline.           Lines/Drains:              Lab Results: I have reviewed the following results:   Results from last 7 days   Lab Units 11/29/24  0524   WBC Thousand/uL 5.05   HEMOGLOBIN g/dL 10.0*   HEMATOCRIT % 30.8*   PLATELETS Thousands/uL 148*   SEGS PCT % 68   LYMPHO PCT % 19   MONO PCT % 11   EOS PCT % 1     Results from last 7 days   Lab Units 11/29/24  0524 11/27/24  2043   SODIUM mmol/L 137 137   POTASSIUM mmol/L 3.7 3.8   CHLORIDE mmol/L 103 101   CO2 mmol/L 29 28   BUN mg/dL 17 24   CREATININE mg/dL 0.90 1.27   ANION GAP mmol/L 5 8   CALCIUM mg/dL 8.7 8.8   ALBUMIN g/dL  --  3.3*   TOTAL BILIRUBIN mg/dL  --  2.46*   ALK PHOS U/L  --  77   ALT U/L  --  6*   AST U/L  --  10*   GLUCOSE RANDOM mg/dL 104 119         Results from last 7 days   Lab Units 11/27/24  1917   POC GLUCOSE mg/dl 130               Recent Cultures (last 7 days):         Imaging Results Review: I personally reviewed the following image studies/reports in PACS and discussed pertinent findings with Radiology: chest xray. My interpretation of the radiology images/reports is:  .  Other Study Results Review: EKG was reviewed.     Last 24 Hours Medication  List:     Current Facility-Administered Medications:     acetaminophen (TYLENOL) oral suspension 650 mg, Q6H PRN    apixaban (ELIQUIS) tablet 5 mg, BID    colchicine (COLCRYS) tablet 0.6 mg, Daily    diltiazem (CARDIZEM CD) 24 hr capsule 240 mg, Daily    furosemide (LASIX) tablet 40 mg, Daily    melatonin tablet 3 mg, HS    senna (SENOKOT) tablet 8.6 mg, Daily    tamsulosin (FLOMAX) capsule 0.4 mg, Daily With Dinner    trimethobenzamide (TIGAN) IM injection 200 mg, Q6H PRN    Administrative Statements   Today, Patient Was Seen By: Sammy Ohara MD  I have spent a total time of 30 minutes in caring for this patient on the day of the visit/encounter including Diagnostic results.    **Please Note: This note may have been constructed using a voice recognition system.**

## 2024-11-30 LAB
ALBUMIN SERPL BCG-MCNC: 2.9 G/DL (ref 3.5–5)
ALP SERPL-CCNC: 79 U/L (ref 34–104)
ALT SERPL W P-5'-P-CCNC: 6 U/L (ref 7–52)
ANION GAP SERPL CALCULATED.3IONS-SCNC: 6 MMOL/L (ref 4–13)
AST SERPL W P-5'-P-CCNC: 10 U/L (ref 13–39)
BASOPHILS # BLD AUTO: 0.02 THOUSANDS/ΜL (ref 0–0.1)
BASOPHILS NFR BLD AUTO: 1 % (ref 0–1)
BILIRUB DIRECT SERPL-MCNC: 0.6 MG/DL (ref 0–0.2)
BILIRUB SERPL-MCNC: 1.7 MG/DL (ref 0.2–1)
BUN SERPL-MCNC: 16 MG/DL (ref 5–25)
CALCIUM SERPL-MCNC: 8.7 MG/DL (ref 8.4–10.2)
CHLORIDE SERPL-SCNC: 102 MMOL/L (ref 96–108)
CO2 SERPL-SCNC: 29 MMOL/L (ref 21–32)
CREAT SERPL-MCNC: 0.87 MG/DL (ref 0.6–1.3)
EOSINOPHIL # BLD AUTO: 0.04 THOUSAND/ΜL (ref 0–0.61)
EOSINOPHIL NFR BLD AUTO: 1 % (ref 0–6)
ERYTHROCYTE [DISTWIDTH] IN BLOOD BY AUTOMATED COUNT: 15.7 % (ref 11.6–15.1)
GFR SERPL CREATININE-BSD FRML MDRD: 79 ML/MIN/1.73SQ M
GLUCOSE SERPL-MCNC: 98 MG/DL (ref 65–140)
HCT VFR BLD AUTO: 33.3 % (ref 36.5–49.3)
HGB BLD-MCNC: 10.5 G/DL (ref 12–17)
IMM GRANULOCYTES # BLD AUTO: 0.01 THOUSAND/UL (ref 0–0.2)
IMM GRANULOCYTES NFR BLD AUTO: 0 % (ref 0–2)
LYMPHOCYTES # BLD AUTO: 1.12 THOUSANDS/ΜL (ref 0.6–4.47)
LYMPHOCYTES NFR BLD AUTO: 28 % (ref 14–44)
MCH RBC QN AUTO: 30.7 PG (ref 26.8–34.3)
MCHC RBC AUTO-ENTMCNC: 31.5 G/DL (ref 31.4–37.4)
MCV RBC AUTO: 97 FL (ref 82–98)
MONOCYTES # BLD AUTO: 0.45 THOUSAND/ΜL (ref 0.17–1.22)
MONOCYTES NFR BLD AUTO: 11 % (ref 4–12)
NEUTROPHILS # BLD AUTO: 2.38 THOUSANDS/ΜL (ref 1.85–7.62)
NEUTS SEG NFR BLD AUTO: 59 % (ref 43–75)
NRBC BLD AUTO-RTO: 0 /100 WBCS
PLATELET # BLD AUTO: 160 THOUSANDS/UL (ref 149–390)
PMV BLD AUTO: 9.3 FL (ref 8.9–12.7)
POTASSIUM SERPL-SCNC: 3.7 MMOL/L (ref 3.5–5.3)
PROT SERPL-MCNC: 5.6 G/DL (ref 6.4–8.4)
RBC # BLD AUTO: 3.42 MILLION/UL (ref 3.88–5.62)
SODIUM SERPL-SCNC: 137 MMOL/L (ref 135–147)
WBC # BLD AUTO: 4.02 THOUSAND/UL (ref 4.31–10.16)

## 2024-11-30 PROCEDURE — 99232 SBSQ HOSP IP/OBS MODERATE 35: CPT | Performed by: INTERNAL MEDICINE

## 2024-11-30 PROCEDURE — 85025 COMPLETE CBC W/AUTO DIFF WBC: CPT | Performed by: INTERNAL MEDICINE

## 2024-11-30 PROCEDURE — 80076 HEPATIC FUNCTION PANEL: CPT | Performed by: INTERNAL MEDICINE

## 2024-11-30 PROCEDURE — 80048 BASIC METABOLIC PNL TOTAL CA: CPT | Performed by: INTERNAL MEDICINE

## 2024-11-30 RX ADMIN — Medication 3 MG: at 21:11

## 2024-11-30 RX ADMIN — ACETAMINOPHEN 650 MG: 650 SUSPENSION ORAL at 21:11

## 2024-11-30 RX ADMIN — SENNOSIDES 8.6 MG: 8.6 TABLET, FILM COATED ORAL at 09:23

## 2024-11-30 RX ADMIN — APIXABAN 5 MG: 5 TABLET, FILM COATED ORAL at 17:17

## 2024-11-30 RX ADMIN — TAMSULOSIN HYDROCHLORIDE 0.4 MG: 0.4 CAPSULE ORAL at 17:17

## 2024-11-30 RX ADMIN — FUROSEMIDE 40 MG: 40 TABLET ORAL at 09:23

## 2024-11-30 RX ADMIN — COLCHICINE 0.6 MG: 0.6 TABLET ORAL at 09:23

## 2024-11-30 RX ADMIN — APIXABAN 5 MG: 5 TABLET, FILM COATED ORAL at 09:23

## 2024-11-30 RX ADMIN — DILTIAZEM HYDROCHLORIDE 240 MG: 240 CAPSULE, COATED, EXTENDED RELEASE ORAL at 09:23

## 2024-11-30 NOTE — QUICK NOTE
MRI brain was motion degraded, but no mass effect, acute intracranial hemorrhage, or evidence of recent infarction.  Mild scattered chronic microvascular ischemic changes.  Stable ventriculomegaly out of proportion to the degree of cerebral volume loss.  Correlate clinically for the presence of normal pressure hydrocephalus.    Patient should keep his appointment scheduled with neurosurgery on 12/18/2024 in their NPH clinic.    No further inpatient Neuro recommendations.

## 2024-11-30 NOTE — ASSESSMENT & PLAN NOTE
Wt Readings from Last 3 Encounters:   11/30/24 92.2 kg (203 lb 4.2 oz)   10/12/24 112 kg (248 lb)   08/06/24 113 kg (248 lb 7.3 oz)   -History of heart failure preserved EF  -Clinically does not look volume overloaded  -Last echo from 8/24 with EF 55% and grade 2 diastolic dysfunction  -Continue Lasix home dose

## 2024-11-30 NOTE — PROGRESS NOTES
Progress Note - Hospitalist   Name: Antonio Duncan 84 y.o. male I MRN: [id number]  Unit/Bed#: -01 I Date of Admission: 11/27/2024   Date of Service: 11/30/2024 I Hospital Day: 2    Assessment & Plan  Recurrent falls  -Since August of this year when he was diagnosed with NPH he has been having recurrent falls.  He was discharged to rehab and from there to assisted living facility.  -He was taken to [facility] for fall yesterday trauma scan negative was sent back to [facility].  Where he fell again per staff no head strike also had some PTSD features and was sent to the hospital  -CT head, CT lumbar spine and pelvis negative for fracture or subluxation  -Has DJD  -Also recent diagnosis of NPH, patient was seen by neurology , refrred for neurosurgery consultation that is scheduled next month  MRI brain within normal limits  Medical cleared for discharge awaiting placement to rehab    Ambulatory dysfunction  -See above  Chronic acquired lymphedema  -Has chronic lymphedema  CHF (congestive heart failure) (HCC)  Wt Readings from Last 3 Encounters:   11/30/24 92.2 kg (203 lb 4.2 oz)   10/12/24 112 kg (248 lb)   08/06/24 113 kg (248 lb 7.3 oz)   -History of heart failure preserved EF  -Clinically does not look volume overloaded  -Last echo from 8/24 with EF 55% and grade 2 diastolic dysfunction  -Continue Lasix home dose        Atrial fibrillation (HCC)  Remains rate controlled  Continue Cardizem  Continue Eliquis  Radiographic suspicion normal pressure hydrocephalus on CT head  -See above, outpatient neurology and neurosurgery appointment  Benign prostatic hyperplasia without urinary obstruction  -Monitor urine output closely  Bilateral sensorineural hearing loss  -Supportive care  Hyperlipidemia  Continue statin therapy  Benign essential hypertension  Continue home meds  -BP stable  Morbid (severe) obesity due to excess calories (HCC)  Diet exercise counseling provided  Obstructive sleep apnea syndrome  -CPAP at  night  Thoracic aortic aneurysm without rupture (HCC)  -Follow-up with PCP surveillance    VTE Pharmacologic Prophylaxis:   Moderate Risk (Score 3-4) - Pharmacological DVT Prophylaxis Ordered: apixaban (Eliquis).    Mobility:   Basic Mobility Inpatient Raw Score: 9  JH-HLM Goal: 3: Sit at edge of bed  JH-HLM Achieved: 3: Sit at edge of bed  JH-HLM Goal achieved. Continue to encourage appropriate mobility.    Patient Centered Rounds: I performed bedside rounds with nursing staff today.   Discussions with Specialists or Other Care Team Provider: cm, nursing    Education and Discussions with Family / Patient:  pt, son.     Current Length of Stay: 2 day(s)  Current Patient Status: Inpatient   Certification Statement: The patient will continue to require additional inpatient hospital stay due to see below  Discharge Plan:  Medically clear awaiting placement    Code Status: Level 1 - Full Code    Subjective   Denies any acute complaints.  Shortness of breath, cough with fevers, chills    Objective :  Temp:  [98 °F (36.7 °C)-99.9 °F (37.7 °C)] 98 °F (36.7 °C)  HR:  [75-94] 91  BP: (102-132)/() 132/75  Resp:  [16] 16  SpO2:  [93 %-98 %] 94 %    Body mass index is 29.17 kg/m².     Input and Output Summary (last 24 hours):     Intake/Output Summary (Last 24 hours) at 11/30/2024 1143  Last data filed at 11/29/2024 1800  Gross per 24 hour   Intake 720 ml   Output 150 ml   Net 570 ml       Physical Exam      Lines/Drains:              Lab Results: I have reviewed the following results:   Results from last 7 days   Lab Units 11/30/24  0557   WBC Thousand/uL 4.02*   HEMOGLOBIN g/dL 10.5*   HEMATOCRIT % 33.3*   PLATELETS Thousands/uL 160   SEGS PCT % 59   LYMPHO PCT % 28   MONO PCT % 11   EOS PCT % 1     Results from last 7 days   Lab Units 11/30/24  0557   SODIUM mmol/L 137   POTASSIUM mmol/L 3.7   CHLORIDE mmol/L 102   CO2 mmol/L 29   BUN mg/dL 16   CREATININE mg/dL 0.87   ANION GAP mmol/L 6   CALCIUM mg/dL 8.7   ALBUMIN  g/dL 2.9*   TOTAL BILIRUBIN mg/dL 1.70*   ALK PHOS U/L 79   ALT U/L 6*   AST U/L 10*   GLUCOSE RANDOM mg/dL 98         Results from last 7 days   Lab Units 11/27/24 1917   POC GLUCOSE mg/dl 130               Recent Cultures (last 7 days):         Imaging Results Review: I personally reviewed the following image studies/reports in PACS and discussed pertinent findings with Radiology: chest xray. My interpretation of the radiology images/reports is:  .  Other Study Results Review: EKG was reviewed.     Last 24 Hours Medication List:     Current Facility-Administered Medications:     acetaminophen (TYLENOL) oral suspension 650 mg, Q6H PRN    apixaban (ELIQUIS) tablet 5 mg, BID    colchicine (COLCRYS) tablet 0.6 mg, Daily    diltiazem (CARDIZEM CD) 24 hr capsule 240 mg, Daily    furosemide (LASIX) tablet 40 mg, Daily    melatonin tablet 3 mg, HS    senna (SENOKOT) tablet 8.6 mg, Daily    tamsulosin (FLOMAX) capsule 0.4 mg, Daily With Dinner    trimethobenzamide (TIGAN) IM injection 200 mg, Q6H PRN    Administrative Statements   Today, Patient Was Seen By: Sammy Ohara MD  I have spent a total time of 30 minutes in caring for this patient on the day of the visit/encounter including Diagnostic results.    **Please Note: This note may have been constructed using a voice recognition system.**

## 2024-11-30 NOTE — PLAN OF CARE
Problem: SAFETY ADULT  Goal: Patient will remain free of falls  Description: INTERVENTIONS:  - Educate patient/family on patient safety including physical limitations  - Instruct patient to call for assistance with activity   - Consult OT/PT to assist with strengthening/mobility   - Keep Call bell within reach  - Keep bed low and locked with side rails adjusted as appropriate  - Keep care items and personal belongings within reach  - Initiate and maintain comfort rounds  - Make Fall Risk Sign visible to staff  - Apply yellow socks and bracelet for high fall risk patients  - Consider moving patient to room near nurses station  Outcome: Progressing  Goal: Maintain or return to baseline ADL function  Description: INTERVENTIONS:  -  Assess patient's ability to carry out ADLs; assess patient's baseline for ADL function and identify physical deficits which impact ability to perform ADLs (bathing, care of mouth/teeth, toileting, grooming, dressing, etc.)  - Assess/evaluate cause of self-care deficits   - Assess range of motion  - Assess patient's mobility; develop plan if impaired  - Assess patient's need for assistive devices and provide as appropriate  - Encourage maximum independence but intervene and supervise when necessary  - Involve family in performance of ADLs  - Assess for home care needs following discharge   - Consider OT consult to assist with ADL evaluation and planning for discharge  - Provide patient education as appropriate  Outcome: Progressing  Goal: Maintains/Returns to pre admission functional level  Description: INTERVENTIONS:  - Perform AM-PAC 6 Click Basic Mobility/ Daily Activity assessment daily.  - Set and communicate daily mobility goal to care team and patient/family/caregiver.   - Collaborate with rehabilitation services on mobility goals if consulted  - Out of bed for toileting  - Record patient progress and toleration of activity level   Outcome: Progressing     Problem: DISCHARGE  PLANNING  Goal: Discharge to home or other facility with appropriate resources  Description: INTERVENTIONS:  - Identify barriers to discharge w/patient and caregiver  - Arrange for needed discharge resources and transportation as appropriate  - Identify discharge learning needs (meds, wound care, etc.)  - Arrange for interpretive services to assist at discharge as needed  - Refer to Case Management Department for coordinating discharge planning if the patient needs post-hospital services based on physician/advanced practitioner order or complex needs related to functional status, cognitive ability, or social support system  Outcome: Progressing     Problem: Prexisting or High Potential for Compromised Skin Integrity  Goal: Skin integrity is maintained or improved  Description: INTERVENTIONS:  - Identify patients at risk for skin breakdown  - Assess and monitor skin integrity  - Assess and monitor nutrition and hydration status  - Monitor labs   - Assess for incontinence   - Turn and reposition patient  - Assist with mobility/ambulation  - Relieve pressure over bony prominences  - Avoid friction and shearing  - Provide appropriate hygiene as needed including keeping skin clean and dry  - Evaluate need for skin moisturizer/barrier cream  - Collaborate with interdisciplinary team   - Patient/family teaching  - Consider wound care consult   Outcome: Progressing

## 2024-11-30 NOTE — ASSESSMENT & PLAN NOTE
-Since August of this year when he was diagnosed with NPH he has been having recurrent falls.  He was discharged to rehab and from there to assisted living facility.  -He was taken to [facility] for fall yesterday trauma scan negative was sent back to [facility].  Where he fell again per staff no head strike also had some PTSD features and was sent to the hospital  -CT head, CT lumbar spine and pelvis negative for fracture or subluxation  -Has DJD  -Also recent diagnosis of NPH, patient was seen by neurology , refrred for neurosurgery consultation that is scheduled next month  MRI brain within normal limits  Medical cleared for discharge awaiting placement to rehab

## 2024-12-01 VITALS
TEMPERATURE: 98.2 F | RESPIRATION RATE: 20 BRPM | BODY MASS INDEX: 29.04 KG/M2 | WEIGHT: 202.38 LBS | OXYGEN SATURATION: 95 % | HEART RATE: 93 BPM | SYSTOLIC BLOOD PRESSURE: 128 MMHG | DIASTOLIC BLOOD PRESSURE: 80 MMHG

## 2024-12-01 LAB
ALBUMIN SERPL BCG-MCNC: 2.9 G/DL (ref 3.5–5)
ALP SERPL-CCNC: 76 U/L (ref 34–104)
ALT SERPL W P-5'-P-CCNC: 7 U/L (ref 7–52)
ANION GAP SERPL CALCULATED.3IONS-SCNC: 6 MMOL/L (ref 4–13)
AST SERPL W P-5'-P-CCNC: 13 U/L (ref 13–39)
BASOPHILS # BLD AUTO: 0.02 THOUSANDS/ΜL (ref 0–0.1)
BASOPHILS NFR BLD AUTO: 1 % (ref 0–1)
BILIRUB DIRECT SERPL-MCNC: 0.25 MG/DL (ref 0–0.2)
BILIRUB SERPL-MCNC: 0.98 MG/DL (ref 0.2–1)
BUN SERPL-MCNC: 20 MG/DL (ref 5–25)
CALCIUM SERPL-MCNC: 8.6 MG/DL (ref 8.4–10.2)
CHLORIDE SERPL-SCNC: 102 MMOL/L (ref 96–108)
CO2 SERPL-SCNC: 28 MMOL/L (ref 21–32)
CREAT SERPL-MCNC: 0.87 MG/DL (ref 0.6–1.3)
EOSINOPHIL # BLD AUTO: 0.06 THOUSAND/ΜL (ref 0–0.61)
EOSINOPHIL NFR BLD AUTO: 2 % (ref 0–6)
ERYTHROCYTE [DISTWIDTH] IN BLOOD BY AUTOMATED COUNT: 15.3 % (ref 11.6–15.1)
FLUAV RNA RESP QL NAA+PROBE: NEGATIVE
FLUBV RNA RESP QL NAA+PROBE: NEGATIVE
GFR SERPL CREATININE-BSD FRML MDRD: 79 ML/MIN/1.73SQ M
GLUCOSE SERPL-MCNC: 103 MG/DL (ref 65–140)
HCT VFR BLD AUTO: 33.8 % (ref 36.5–49.3)
HGB BLD-MCNC: 10.4 G/DL (ref 12–17)
IMM GRANULOCYTES # BLD AUTO: 0.01 THOUSAND/UL (ref 0–0.2)
IMM GRANULOCYTES NFR BLD AUTO: 0 % (ref 0–2)
LYMPHOCYTES # BLD AUTO: 0.95 THOUSANDS/ΜL (ref 0.6–4.47)
LYMPHOCYTES NFR BLD AUTO: 33 % (ref 14–44)
MCH RBC QN AUTO: 30.1 PG (ref 26.8–34.3)
MCHC RBC AUTO-ENTMCNC: 30.8 G/DL (ref 31.4–37.4)
MCV RBC AUTO: 98 FL (ref 82–98)
MONOCYTES # BLD AUTO: 0.37 THOUSAND/ΜL (ref 0.17–1.22)
MONOCYTES NFR BLD AUTO: 13 % (ref 4–12)
MRSA NOSE QL CULT: NORMAL
NEUTROPHILS # BLD AUTO: 1.5 THOUSANDS/ΜL (ref 1.85–7.62)
NEUTS SEG NFR BLD AUTO: 51 % (ref 43–75)
NRBC BLD AUTO-RTO: 0 /100 WBCS
PLATELET # BLD AUTO: 162 THOUSANDS/UL (ref 149–390)
PMV BLD AUTO: 9.3 FL (ref 8.9–12.7)
POTASSIUM SERPL-SCNC: 3.6 MMOL/L (ref 3.5–5.3)
PROT SERPL-MCNC: 5.5 G/DL (ref 6.4–8.4)
RBC # BLD AUTO: 3.45 MILLION/UL (ref 3.88–5.62)
RSV RNA RESP QL NAA+PROBE: NEGATIVE
SARS-COV-2 RNA RESP QL NAA+PROBE: NEGATIVE
SODIUM SERPL-SCNC: 136 MMOL/L (ref 135–147)
WBC # BLD AUTO: 2.91 THOUSAND/UL (ref 4.31–10.16)

## 2024-12-01 PROCEDURE — 99239 HOSP IP/OBS DSCHRG MGMT >30: CPT | Performed by: INTERNAL MEDICINE

## 2024-12-01 PROCEDURE — 0241U HB NFCT DS VIR RESP RNA 4 TRGT: CPT | Performed by: INTERNAL MEDICINE

## 2024-12-01 PROCEDURE — 85025 COMPLETE CBC W/AUTO DIFF WBC: CPT | Performed by: INTERNAL MEDICINE

## 2024-12-01 PROCEDURE — 80048 BASIC METABOLIC PNL TOTAL CA: CPT | Performed by: INTERNAL MEDICINE

## 2024-12-01 PROCEDURE — 80076 HEPATIC FUNCTION PANEL: CPT | Performed by: INTERNAL MEDICINE

## 2024-12-01 RX ADMIN — DILTIAZEM HYDROCHLORIDE 240 MG: 240 CAPSULE, COATED, EXTENDED RELEASE ORAL at 09:52

## 2024-12-01 RX ADMIN — FUROSEMIDE 40 MG: 40 TABLET ORAL at 09:52

## 2024-12-01 RX ADMIN — APIXABAN 5 MG: 5 TABLET, FILM COATED ORAL at 09:52

## 2024-12-01 RX ADMIN — COLCHICINE 0.6 MG: 0.6 TABLET ORAL at 09:53

## 2024-12-01 RX ADMIN — SENNOSIDES 8.6 MG: 8.6 TABLET, FILM COATED ORAL at 09:52

## 2024-12-01 NOTE — ASSESSMENT & PLAN NOTE
Wt Readings from Last 3 Encounters:   12/01/24 91.8 kg (202 lb 6.1 oz)   10/12/24 112 kg (248 lb)   08/06/24 113 kg (248 lb 7.3 oz)   -History of heart failure preserved EF  -Clinically does not look volume overloaded  -Last echo from 8/24 with EF 55% and grade 2 diastolic dysfunction  -Continue Lasix home dose

## 2024-12-01 NOTE — ASSESSMENT & PLAN NOTE
-Since August of this year when he was diagnosed with NPH he has been having recurrent falls.  He was discharged to rehab and from there to assisted living facility.  -He was taken to [facility] for fall yesterday trauma scan negative was sent back to [facility].  Where he fell again per staff no head strike also had some PTSD features and was sent to the hospital  -CT head, CT lumbar spine and pelvis negative for fracture or subluxation  -Has DJD  -Also recent diagnosis of NPH, patient was seen by neurology , refrred for neurosurgery consultation that is scheduled next month  MRI brain within normal limits  Medical cleared for discharge to rehab

## 2024-12-01 NOTE — PLAN OF CARE
Problem: DISCHARGE PLANNING  Goal: Discharge to home or other facility with appropriate resources  Description: INTERVENTIONS:  - Identify barriers to discharge w/patient and caregiver  - Arrange for needed discharge resources and transportation as appropriate  - Identify discharge learning needs (meds, wound care, etc.)  - Arrange for interpretive services to assist at discharge as needed  - Refer to Case Management Department for coordinating discharge planning if the patient needs post-hospital services based on physician/advanced practitioner order or complex needs related to functional status, cognitive ability, or social support system  Outcome: Progressing     Problem: SAFETY ADULT  Goal: Maintain or return to baseline ADL function  Description: INTERVENTIONS:  -  Assess patient's ability to carry out ADLs; assess patient's baseline for ADL function and identify physical deficits which impact ability to perform ADLs (bathing, care of mouth/teeth, toileting, grooming, dressing, etc.)  - Assess/evaluate cause of self-care deficits   - Assess range of motion  - Assess patient's mobility; develop plan if impaired  - Assess patient's need for assistive devices and provide as appropriate  - Encourage maximum independence but intervene and supervise when necessary  - Involve family in performance of ADLs  - Assess for home care needs following discharge   - Consider OT consult to assist with ADL evaluation and planning for discharge  - Provide patient education as appropriate  Outcome: Progressing

## 2024-12-01 NOTE — DISCHARGE SUMMARY
Discharge Summary - Hospitalist   Name: Antonio Duncan 84 y.o. male I MRN: [id number]  Unit/Bed#: -01 I Date of Admission: 11/27/2024   Date of Service: 12/1/2024 I Hospital Day: 3     Assessment & Plan  Recurrent falls  -Since August of this year when he was diagnosed with NPH he has been having recurrent falls.  He was discharged to rehab and from there to assisted living facility.  -He was taken to [facility] for fall yesterday trauma scan negative was sent back to [facility].  Where he fell again per staff no head strike also had some PTSD features and was sent to the hospital  -CT head, CT lumbar spine and pelvis negative for fracture or subluxation  -Has DJD  -Also recent diagnosis of NPH, patient was seen by neurology , refrred for neurosurgery consultation that is scheduled next month  MRI brain within normal limits  Medical cleared for discharge to rehab  Ambulatory dysfunction  -See above  Chronic acquired lymphedema  -Has chronic lymphedema  CHF (congestive heart failure) (HCC)  Wt Readings from Last 3 Encounters:   12/01/24 91.8 kg (202 lb 6.1 oz)   10/12/24 112 kg (248 lb)   08/06/24 113 kg (248 lb 7.3 oz)   -History of heart failure preserved EF  -Clinically does not look volume overloaded  -Last echo from 8/24 with EF 55% and grade 2 diastolic dysfunction  -Continue Lasix home dose        Atrial fibrillation (HCC)  Remains rate controlled  Continue Cardizem  Continue Eliquis  Radiographic suspicion normal pressure hydrocephalus on CT head  -See above, outpatient neurology and neurosurgery appointment  Benign prostatic hyperplasia without urinary obstruction  -Monitor urine output closely  Bilateral sensorineural hearing loss  -Supportive care  Hyperlipidemia  Continue statin therapy  Benign essential hypertension  Continue home meds  -BP stable  Morbid (severe) obesity due to excess calories (HCC)  Diet exercise counseling provided  Obstructive sleep apnea syndrome  -CPAP at night  Thoracic aortic  aneurysm without rupture (HCC)  -Follow-up with PCP surveillance       Discharging Physician / Practitioner: Sammy Ohara MD  PCP: Rudi Garza MD  Admission Date:   Admission Orders (From admission, onward)       Ordered        11/28/24 1120  INPATIENT ADMISSION  Once            11/27/24 2316  Place in Observation  Once                          Discharge Date: 12/01/24    Medical Problems       Resolved Problems  Date Reviewed: 12/1/2024   None         Consultations During Hospital Stay:  none    Procedures Performed:   MRI brain wo contrast  Result Date: 11/29/2024  Impression: Motion-degraded examination. No mass effect, acute intracranial hemorrhage or evidence of recent infarction. Mild scattered chronic microvascular ischemic change. Stable ventriculomegaly out of proportion to the degree of cerebral volume loss. Correlate clinically for the presence of normal pressure hydrocephalus. Workstation performed: [id number]     XR chest 1 view portable  Result Date: 11/28/2024  Impression: No acute cardiopulmonary disease. Workstation performed: [id number]     CT spine lumbar without contrast  Result Date: 11/27/2024  Impression: Degenerative change as noted without acute fracture/subluxation in the lumbar spine. Workstation performed: [id number]     CT pelvis wo contrast  Result Date: 11/27/2024  Impression: No acute posttraumatic abnormality identified in the pelvis. Workstation performed: [id number]     CT head without contrast  Result Date: 11/27/2024  Impression: No acute intracranial abnormality.  Stable chronic microangiopathic changes within the brain. Stable ventriculomegaly out of proportion to degree of parenchymal loss raising the possibility of underlying normal pressure hydrocephalus. Workstation performed: [id number]     CT spine cervical wo contrast  Result Date: 11/27/2024  Impression: IMPRESSION: No acute bony traumatic changes.    CT head wo contrast  Result Date:  11/27/2024  Impression: IMPRESSION: No acute intracranial traumatic changes.     Significant Findings / Test Results:   none    Incidental Findings:   none     Test Results Pending at Discharge (will require follow up):   none     Outpatient Tests Requested:  none    Complications:  none    Reason for Admission:  Recurrent falls  Hospital Course:     Antonio Duncan is a 84 y.o. male patient who originally presented to the hospital on 11/27/2024 BPH, hypertension initially presented with recurrent falls.  Was evaluated by PT/OT recommended rehab ultimately discharged to rehab facility will follow-up outpatient with primary care doctor    Please see above list of diagnoses and related plan for additional information.     Condition at Discharge: stable     Discharge Day Visit / Exam:     Subjective:  denies cp, sob, next week into right cough,   Vitals: Blood Pressure: 128/80 (12/01/24 0828)  Pulse: 93 (12/01/24 0828)  Temperature: 98.2 °F (36.8 °C) (12/01/24 0828)  Temp Source: Axillary (11/29/24 2109)  Respirations: 20 (11/30/24 0740)  Weight - Scale: 91.8 kg (202 lb 6.1 oz) (12/01/24 0600)  SpO2: 95 % (12/01/24 0828)  Exam:   Physical Exam  (  Discussion with Family: pt    Discharge instructions/Information to patient and family:   See after visit summary for information provided to patient and family.      Provisions for Follow-Up Care:  See after visit summary for information related to follow-up care and any pertinent home health orders.      Disposition:     Home    For Discharges to [facility] SNF:   Not Applicable to this Patient - Not Applicable to this Patient    Planned Readmission: none     Discharge Statement:  I spent 60 minutes discharging the patient. This time was spent on the day of discharge. I had direct contact with the patient on the day of discharge. Greater than 50% of the total time was spent examining patient, answering all patient questions, arranging and discussing plan of care  with patient as well as directly providing post-discharge instructions.  Additional time then spent on discharge activities.    Discharge Medications:  See after visit summary for reconciled discharge medications provided to patient and family.      ** Please Note: This note has been constructed using a voice recognition system **

## 2024-12-01 NOTE — CASE MANAGEMENT
Case Management Discharge Planning Note    Patient name Antonio Duncan  Location /-01 MRN [id number]  : 1940 Date 2024       Current Admission Date: 2024  Current Admission Diagnosis:Recurrent falls   Patient Active Problem List    Diagnosis Date Noted Date Diagnosed    Recurrent falls 2024     Bilateral sensorineural hearing loss 2024     Benign essential hypertension 2024     Thoracic aortic aneurysm without rupture (HCC) 2024     Benign prostatic hyperplasia without urinary obstruction 2024     Chronic gout, unspecified, without tophus (tophi) 2024     Morbid (severe) obesity due to excess calories (HCC) 2024     Ambulatory dysfunction 2024     Generalized weakness 2024     Chronic acquired lymphedema 2024     CHF (congestive heart failure) (HCC) 2024     Atrial fibrillation (HCC) 2024     Pain and swelling of left upper extremity 2024     Altered mental status 2024     Elevated liver enzymes 2024     Radiographic suspicion normal pressure hydrocephalus on CT head 2024     Mitral valve insufficiency, acquired 2017     Obstructive sleep apnea syndrome 2014     Hyperlipidemia 2014       LOS (days): 3  Geometric Mean LOS (GMLOS) (days): 3.8  Days to GMLOS:0.9     OBJECTIVE:  Risk of Unplanned Readmission Score: 13.38         Current admission status: Inpatient   Preferred Pharmacy:   [facility] [address] [address]  Phone: [phone] Fax: [phone]    Primary Care Provider: Rudi Garza MD    Primary Insurance: MEDICARE  Secondary Insurance:  FOR LIFE    DISCHARGE DETAILS:    Discharge planning discussed with:: pt at bedside and daughter Ketty via phone  Freedom of Choice: Yes  Comments - Freedom of Choice: Pt to be d/vinay to [place] today via [facility] [place].  Covid test needs to be done  prior.  Mario Hdez is agreeable to this dcp.  IMM reviewed with pt and signed by him.  Copy to pt and copy to MR for scanning  CM contacted family/caregiver?: Yes  Were Treatment Team discharge recommendations reviewed with patient/caregiver?: Yes  Did patient/caregiver verbalize understanding of patient care needs?: Yes  Were patient/caregiver advised of the risks associated with not following Treatment Team discharge recommendations?: Yes    Contacts  Patient Contacts: Mario Hdez  Relationship to Patient:: Family  Contact Method: Phone  Phone Number: [phone]  Reason/Outcome: Discharge Planning    Requested Home Health Care         Is the patient interested in HHC at discharge?: No    DME Referral Provided  Referral made for DME?: No    Other Referral/Resources/Interventions Provided:  Interventions: Short Term Rehab, Transportation  Referral Comments: Transport to [facility] requested for 13:00.  Linda from [facility] is agreeable to this time.  Covid test must be done prior    Would you like to participate in our [facility] service program?  : No - Declined    Treatment Team Recommendation: Short Term Rehab  Discharge Destination Plan:: Short Term Rehab  Transport at Discharge : [facility]     Number/Name of Dispatcher: Roundtrip     ETA of Transport (Date): 12/01/24  ETA of Transport (Time): 1300              IMM Given (Date):: 12/01/24  IMM Given to:: Patient  Family notified:: mario Hdez

## 2024-12-13 ENCOUNTER — TELEPHONE (OUTPATIENT)
Age: 84
End: 2024-12-13

## 2024-12-13 DIAGNOSIS — R41.89 COGNITIVE DECLINE: ICD-10-CM

## 2024-12-13 NOTE — TELEPHONE ENCOUNTER
Pt's SUMAYA Hdez called to cancel upcoming appt on 12/19/24 for NPH work-up.  She states that the pt was readmitted to the hospital at the end of November and is now residing in a Acute rehab and has actually had an improvement in sx's.  She states they will CB if they wish to proceed with evaluation by our office.

## 2024-12-17 ENCOUNTER — PATIENT MESSAGE (OUTPATIENT)
Dept: NEUROLOGY | Facility: CLINIC | Age: 84
End: 2024-12-17

## 2025-01-02 ENCOUNTER — PATIENT MESSAGE (OUTPATIENT)
Age: 85
End: 2025-01-02

## 2025-01-02 ENCOUNTER — TELEPHONE (OUTPATIENT)
Age: 85
End: 2025-01-02

## 2025-01-02 NOTE — PATIENT COMMUNICATION
Patient's daughter in law (Ketty) calling again for answer to my chart message sent to Dr. Briggs regarding MRI.      Dr. Briggs, MRI Brain neuroquant with and without contrast was ordered; MRI brain wo contrast done 11/29.    Ketty advised patient is currently in [place] and they would arrange transportation if scheduled while patient is still there.    Also asking for communication consent for patient to complete as none on file (see other encounter.     Dr. Briggs:  please advise, thank you.

## 2025-01-02 NOTE — TELEPHONE ENCOUNTER
No further imaging needed at this time, MRI w/o is sufficient which did show only changes related to NPH.